# Patient Record
Sex: FEMALE | Race: WHITE | NOT HISPANIC OR LATINO | Employment: OTHER | URBAN - METROPOLITAN AREA
[De-identification: names, ages, dates, MRNs, and addresses within clinical notes are randomized per-mention and may not be internally consistent; named-entity substitution may affect disease eponyms.]

---

## 2019-03-11 ENCOUNTER — HOSPITAL ENCOUNTER (OUTPATIENT)
Dept: CARDIOLOGY | Facility: HOSPITAL | Age: 64
Discharge: HOME | End: 2019-03-11

## 2019-03-19 ENCOUNTER — HOSPITAL ENCOUNTER (OUTPATIENT)
Dept: OTHER | Facility: HOSPITAL | Age: 64
Discharge: HOME | End: 2019-03-19

## 2019-03-19 LAB
CREAT BLD-MCNC: 0.7 MG/DL (ref 0.6–1.4)
GFR SERPLBLD BASED ON 1.73 SQ M-ARVRAT: >60 ML/MIN/{1.73_M2}

## 2019-04-02 ENCOUNTER — HOSPITAL ENCOUNTER (OUTPATIENT)
Dept: CARDIOLOGY | Facility: HOSPITAL | Age: 64
Discharge: HOME | End: 2019-04-02

## 2019-05-13 ENCOUNTER — HOSPITAL ENCOUNTER (OUTPATIENT)
Dept: OTHER | Facility: HOSPITAL | Age: 64
Discharge: HOME | End: 2019-05-13
Attending: INTERNAL MEDICINE

## 2019-05-13 LAB
ANION GAP SERPL CALC-SCNC: 15 MMOL/L (ref 8–19)
BASOPHILS # BLD AUTO: 0.04 10*3/UL (ref 0–0.2)
BASOPHILS NFR BLD AUTO: 0.7 % (ref 0–3)
BUN SERPL-MCNC: 8 MG/DL (ref 5–25)
BUN/CREAT SERPL: 13 {RATIO} (ref 6–20)
CALCIUM SERPL-MCNC: 9.9 MG/DL (ref 8.7–10.4)
CHLORIDE SERPL-SCNC: 100 MMOL/L (ref 99–111)
CONV ABS IMM GRAN: 0.01 10*3/UL (ref 0–0.2)
CONV CO2: 29 MMOL/L (ref 22–32)
CONV IMMATURE GRAN: 0.2 % (ref 0–1.8)
CREAT UR-MCNC: 0.62 MG/DL (ref 0.5–0.9)
DEPRECATED RDW RBC AUTO: 51.4 FL (ref 36.4–46.3)
EOSINOPHIL # BLD AUTO: 0.15 10*3/UL (ref 0–0.7)
EOSINOPHIL # BLD AUTO: 2.5 % (ref 0–7)
ERYTHROCYTE [DISTWIDTH] IN BLOOD BY AUTOMATED COUNT: 14.2 % (ref 11.7–14.4)
GFR SERPLBLD BASED ON 1.73 SQ M-ARVRAT: >60 ML/MIN/{1.73_M2}
GLUCOSE SERPL-MCNC: 134 MG/DL (ref 65–99)
HBA1C MFR BLD: 15.1 G/DL (ref 12–16)
HCT VFR BLD AUTO: 47.2 % (ref 37–47)
INR PPP: 0.97 (ref 2–3)
LYMPHOCYTES # BLD AUTO: 2.95 10*3/UL (ref 1–5)
MCH RBC QN AUTO: 31 PG (ref 27–31)
MCHC RBC AUTO-ENTMCNC: 32 G/DL (ref 33–37)
MCV RBC AUTO: 96.9 FL (ref 81–99)
MONOCYTES # BLD AUTO: 0.44 10*3/UL (ref 0.2–1.2)
MONOCYTES NFR BLD AUTO: 7.4 % (ref 3–10)
NEUTROPHILS # BLD AUTO: 2.36 10*3/UL (ref 2–8)
NEUTROPHILS NFR BLD AUTO: 39.6 % (ref 30–85)
NRBC CBCN: 0 % (ref 0–0.7)
OSMOLALITY SERPL CALC.SUM OF ELEC: 290 MOSM/KG (ref 273–304)
PLATELET # BLD AUTO: 189 10*3/UL (ref 130–400)
PMV BLD AUTO: 11.3 FL (ref 9.4–12.3)
POTASSIUM SERPL-SCNC: 4.2 MMOL/L (ref 3.5–5.3)
PROTHROMBIN TIME: 10.1 S (ref 9.4–12)
RBC # BLD AUTO: 4.87 10*6/UL (ref 4.2–5.4)
SODIUM SERPL-SCNC: 140 MMOL/L (ref 135–147)
VARIANT LYMPHS NFR BLD MANUAL: 49.6 % (ref 20–45)
WBC # BLD AUTO: 5.95 10*3/UL (ref 4.8–10.8)

## 2019-05-22 ENCOUNTER — HOSPITAL ENCOUNTER (OUTPATIENT)
Dept: INFUSION THERAPY | Facility: HOSPITAL | Age: 64
Setting detail: HOSPITAL OUTPATIENT SURGERY
Discharge: HOME | End: 2019-05-22
Attending: INTERNAL MEDICINE

## 2019-05-22 LAB
HBA1C MFR BLD: 15 G/DL (ref 12–16)
HCT VFR BLD AUTO: 47.2 % (ref 37–47)

## 2021-06-02 ENCOUNTER — TRANSCRIBE ORDERS (OUTPATIENT)
Dept: ADMINISTRATIVE | Facility: HOSPITAL | Age: 66
End: 2021-06-02

## 2021-06-02 DIAGNOSIS — S46.912A STRAIN OF UNSPECIFIED MUSCLE, FASCIA AND TENDON AT SHOULDER AND UPPER ARM LEVEL, LEFT ARM, INITIAL ENCOUNTER: Primary | ICD-10-CM

## 2023-02-02 ENCOUNTER — TRANSCRIBE ORDERS (OUTPATIENT)
Dept: ADMINISTRATIVE | Facility: HOSPITAL | Age: 68
End: 2023-02-02
Payer: MEDICARE

## 2023-02-02 DIAGNOSIS — Z12.2 ENCOUNTER FOR SCREENING FOR LUNG CANCER: ICD-10-CM

## 2023-02-02 DIAGNOSIS — R09.89 ABNORMAL CHEST SOUNDS: Primary | ICD-10-CM

## 2023-02-02 DIAGNOSIS — R09.89 BRUIT: ICD-10-CM

## 2023-02-02 DIAGNOSIS — R09.89 CAROTID BRUIT, UNSPECIFIED LATERALITY: Primary | ICD-10-CM

## 2023-02-15 ENCOUNTER — HOSPITAL ENCOUNTER (OUTPATIENT)
Dept: CT IMAGING | Facility: HOSPITAL | Age: 68
Discharge: HOME | End: 2023-02-15
Admitting: NURSE PRACTITIONER
Payer: MEDICARE

## 2023-02-15 DIAGNOSIS — Z12.2 ENCOUNTER FOR SCREENING FOR LUNG CANCER: ICD-10-CM

## 2023-02-15 PROCEDURE — 71271 CT THORAX LUNG CANCER SCR C-: CPT

## 2023-03-01 ENCOUNTER — HOSPITAL ENCOUNTER (OUTPATIENT)
Dept: CARDIOLOGY | Facility: HOSPITAL | Age: 68
Discharge: HOME | End: 2023-03-01
Admitting: NURSE PRACTITIONER
Payer: MEDICARE

## 2023-03-01 DIAGNOSIS — R09.89 BRUIT: ICD-10-CM

## 2023-03-01 LAB
BH CV XLRA MEAS LEFT CAROTID BULB EDV: 39 CM/SEC
BH CV XLRA MEAS LEFT CAROTID BULB PSV: 215 CM/SEC
BH CV XLRA MEAS LEFT DIST CCA EDV: 16 CM/SEC
BH CV XLRA MEAS LEFT DIST CCA PSV: 63 CM/SEC
BH CV XLRA MEAS LEFT DIST ICA EDV: 26 CM/SEC
BH CV XLRA MEAS LEFT DIST ICA PSV: 89 CM/SEC
BH CV XLRA MEAS LEFT MID ICA EDV: 52 CM/SEC
BH CV XLRA MEAS LEFT MID ICA PSV: 226 CM/SEC
BH CV XLRA MEAS LEFT PROX CCA EDV: 14 CM/SEC
BH CV XLRA MEAS LEFT PROX CCA PSV: 72 CM/SEC
BH CV XLRA MEAS LEFT PROX ECA EDV: 21 CM/SEC
BH CV XLRA MEAS LEFT PROX ECA PSV: 126 CM/SEC
BH CV XLRA MEAS LEFT PROX ICA EDV: 78 CM/SEC
BH CV XLRA MEAS LEFT PROX ICA PSV: 335 CM/SEC
BH CV XLRA MEAS LEFT VERTEBRAL A EDV: 23 CM/SEC
BH CV XLRA MEAS LEFT VERTEBRAL A PSV: 77 CM/SEC
BH CV XLRA MEAS RIGHT CAROTID BULB EDV: 56 CM/SEC
BH CV XLRA MEAS RIGHT CAROTID BULB PSV: 165 CM/SEC
BH CV XLRA MEAS RIGHT DIST CCA EDV: 23 CM/SEC
BH CV XLRA MEAS RIGHT DIST CCA PSV: 58 CM/SEC
BH CV XLRA MEAS RIGHT DIST ICA EDV: 42 CM/SEC
BH CV XLRA MEAS RIGHT DIST ICA PSV: 162 CM/SEC
BH CV XLRA MEAS RIGHT MID ICA EDV: 59 CM/SEC
BH CV XLRA MEAS RIGHT MID ICA PSV: 233 CM/SEC
BH CV XLRA MEAS RIGHT PROX CCA EDV: 17 CM/SEC
BH CV XLRA MEAS RIGHT PROX CCA PSV: 63 CM/SEC
BH CV XLRA MEAS RIGHT PROX ECA EDV: 18 CM/SEC
BH CV XLRA MEAS RIGHT PROX ECA PSV: 122 CM/SEC
BH CV XLRA MEAS RIGHT PROX ICA EDV: 94 CM/SEC
BH CV XLRA MEAS RIGHT PROX ICA PSV: 301 CM/SEC
BH CV XLRA MEAS RIGHT VERTEBRAL A EDV: 15 CM/SEC
BH CV XLRA MEAS RIGHT VERTEBRAL A PSV: 52 CM/SEC
LEFT ARM BP: NORMAL MMHG
MAXIMAL PREDICTED HEART RATE: 153 BPM
RIGHT ARM BP: NORMAL MMHG
STRESS TARGET HR: 130 BPM

## 2023-03-01 PROCEDURE — 93880 EXTRACRANIAL BILAT STUDY: CPT

## 2023-03-01 PROCEDURE — 93880 EXTRACRANIAL BILAT STUDY: CPT | Performed by: SURGERY

## 2023-04-13 ENCOUNTER — TRANSCRIBE ORDERS (OUTPATIENT)
Dept: ADMINISTRATIVE | Facility: HOSPITAL | Age: 68
End: 2023-04-13
Payer: MEDICARE

## 2023-04-13 DIAGNOSIS — I65.23 BILATERAL CAROTID ARTERY STENOSIS: Primary | ICD-10-CM

## 2023-08-08 ENCOUNTER — HOSPITAL ENCOUNTER (OUTPATIENT)
Dept: NUCLEAR MEDICINE | Facility: HOSPITAL | Age: 68
Discharge: HOME | End: 2023-08-08
Payer: MEDICARE

## 2023-08-08 DIAGNOSIS — I25.118 CORONARY ARTERY DISEASE INVOLVING NATIVE CORONARY ARTERY OF NATIVE HEART WITH OTHER FORM OF ANGINA PECTORIS: ICD-10-CM

## 2023-08-08 DIAGNOSIS — Z01.818 PREOPERATIVE CLEARANCE: ICD-10-CM

## 2023-08-08 PROCEDURE — 93017 CV STRESS TEST TRACING ONLY: CPT

## 2023-08-08 PROCEDURE — 0 TECHNETIUM TETROFOSMIN KIT: Performed by: INTERNAL MEDICINE

## 2023-08-08 PROCEDURE — 25010000002 REGADENOSON 0.4 MG/5ML SOLUTION: Performed by: INTERNAL MEDICINE

## 2023-08-08 PROCEDURE — 78452 HT MUSCLE IMAGE SPECT MULT: CPT

## 2023-08-08 PROCEDURE — A9502 TC99M TETROFOSMIN: HCPCS | Performed by: INTERNAL MEDICINE

## 2023-08-08 RX ORDER — REGADENOSON 0.08 MG/ML
0.4 INJECTION, SOLUTION INTRAVENOUS
Status: COMPLETED | OUTPATIENT
Start: 2023-08-08 | End: 2023-08-08

## 2023-08-08 RX ADMIN — TETROFOSMIN 1 DOSE: 1.38 INJECTION, POWDER, LYOPHILIZED, FOR SOLUTION INTRAVENOUS at 08:59

## 2023-08-08 RX ADMIN — TETROFOSMIN 1 DOSE: 1.38 INJECTION, POWDER, LYOPHILIZED, FOR SOLUTION INTRAVENOUS at 07:25

## 2023-08-08 RX ADMIN — REGADENOSON 0.4 MG: 0.08 INJECTION, SOLUTION INTRAVENOUS at 08:59

## 2023-08-09 LAB
BH CV IMMEDIATE POST RECOVERY TECH DATA SYMPTOMS: NORMAL
BH CV IMMEDIATE POST TECH DATA BLOOD PRESSURE: NORMAL MMHG
BH CV IMMEDIATE POST TECH DATA HEART RATE: 112 BPM
BH CV IMMEDIATE POST TECH DATA OXYGEN SATS: 97 %
BH CV NINE MINUTE RECOVERY TECH DATA BLOOD PRESSURE: NORMAL MMHG
BH CV NINE MINUTE RECOVERY TECH DATA HEART RATE: 76 BPM
BH CV NINE MINUTE RECOVERY TECH DATA OXYGEN SATURATION: 97 %
BH CV NINE MINUTE RECOVERY TECH DATA SYMPTOMS: NORMAL
BH CV REST NUCLEAR ISOTOPE DOSE: 10 MCI
BH CV SIX MINUTE RECOVERY TECH DATA BLOOD PRESSURE: NORMAL
BH CV SIX MINUTE RECOVERY TECH DATA HEART RATE: 82 BPM
BH CV SIX MINUTE RECOVERY TECH DATA OXYGEN SATURATION: 97 %
BH CV SIX MINUTE RECOVERY TECH DATA SYMPTOMS: NORMAL
BH CV STRESS BP STAGE 1: NORMAL
BH CV STRESS BP STAGE 2: NORMAL
BH CV STRESS COMMENTS STAGE 1: NORMAL
BH CV STRESS COMMENTS STAGE 2: NORMAL
BH CV STRESS DOSE REGADENOSON STAGE 1: 0.4
BH CV STRESS DURATION MIN STAGE 1: 0
BH CV STRESS DURATION MIN STAGE 2: 2
BH CV STRESS DURATION SEC STAGE 1: 0
BH CV STRESS DURATION SEC STAGE 2: 0
BH CV STRESS GRADE STAGE 1: 10
BH CV STRESS GRADE STAGE 2: 0
BH CV STRESS HR STAGE 1: 115
BH CV STRESS HR STAGE 2: 125
BH CV STRESS METS STAGE 1: 5
BH CV STRESS NUCLEAR ISOTOPE DOSE: 35.9 MCI
BH CV STRESS O2 STAGE 1: 96
BH CV STRESS PROTOCOL 1: NORMAL
BH CV STRESS RECOVERY BP: NORMAL MMHG
BH CV STRESS RECOVERY HR: 77 BPM
BH CV STRESS RECOVERY O2: 97 %
BH CV STRESS SPEED STAGE 1: 1.7
BH CV STRESS SPEED STAGE 2: 1.6
BH CV STRESS STAGE 1: 1
BH CV STRESS STAGE 2: 2
BH CV THREE MINUTE POST TECH DATA BLOOD PRESSURE: NORMAL MMHG
BH CV THREE MINUTE POST TECH DATA HEART RATE: 88 BPM
BH CV THREE MINUTE POST TECH DATA OXYGEN SATURATION: 96 %
BH CV THREE MINUTE RECOVERY TECH DATA SYMPTOM: NORMAL
LV EF NUC BP: 42 %
MAXIMAL PREDICTED HEART RATE: 153 BPM
PERCENT MAX PREDICTED HR: 81.7 %
STRESS BASELINE BP: NORMAL MMHG
STRESS BASELINE HR: 61 BPM
STRESS O2 SAT REST: 94 %
STRESS PERCENT HR: 96 %
STRESS POST ESTIMATED WORKLOAD: 4.9 METS
STRESS POST EXERCISE DUR MIN: 5 MIN
STRESS POST EXERCISE DUR SEC: 0 SEC
STRESS POST O2 SAT PEAK: 98 %
STRESS POST PEAK BP: NORMAL MMHG
STRESS POST PEAK HR: 125 BPM
STRESS TARGET HR: 130 BPM

## 2023-08-11 ENCOUNTER — TELEPHONE (OUTPATIENT)
Dept: CARDIOLOGY | Facility: CLINIC | Age: 68
End: 2023-08-11
Payer: MEDICARE

## 2023-08-11 NOTE — TELEPHONE ENCOUNTER
----- Message from HERVE Ferrell sent at 8/10/2023 10:08 AM EDT -----  Need to see her for follow up in clinic to discuss the results of her stress test.    +generalized weakness, +left arm pain

## 2023-08-14 ENCOUNTER — TELEPHONE (OUTPATIENT)
Dept: CARDIOLOGY | Facility: CLINIC | Age: 68
End: 2023-08-14

## 2023-08-14 ENCOUNTER — PREP FOR SURGERY (OUTPATIENT)
Dept: OTHER | Facility: HOSPITAL | Age: 68
End: 2023-08-14
Payer: MEDICARE

## 2023-08-14 ENCOUNTER — TELEPHONE (OUTPATIENT)
Dept: CARDIOLOGY | Facility: CLINIC | Age: 68
End: 2023-08-14
Payer: MEDICARE

## 2023-08-14 DIAGNOSIS — Z01.818 PREOPERATIVE CLEARANCE: Primary | ICD-10-CM

## 2023-08-14 DIAGNOSIS — R94.39 ABNORMAL NUCLEAR STRESS TEST: ICD-10-CM

## 2023-08-14 RX ORDER — SODIUM CHLORIDE 9 MG/ML
40 INJECTION, SOLUTION INTRAVENOUS AS NEEDED
OUTPATIENT
Start: 2023-08-14

## 2023-08-14 RX ORDER — SODIUM CHLORIDE 0.9 % (FLUSH) 0.9 %
10 SYRINGE (ML) INJECTION AS NEEDED
OUTPATIENT
Start: 2023-08-14

## 2023-08-14 RX ORDER — SODIUM CHLORIDE 0.9 % (FLUSH) 0.9 %
10 SYRINGE (ML) INJECTION EVERY 12 HOURS SCHEDULED
OUTPATIENT
Start: 2023-08-14

## 2023-08-14 NOTE — TELEPHONE ENCOUNTER
I did not speak to this patient. I forwarded the message for patient to schedule an appointment per Allie's request. It appears patient is wanting to review results over the phone/ televisit. Please advise if ok.

## 2023-08-14 NOTE — TELEPHONE ENCOUNTER
Hub staff attempted to follow warm transfer process and was unsuccessful     Caller: GABRIELLA TELLEZ    Relationship to patient: Emergency Contact    Best call back number: [phone]     Patient is needing: PT DAUGHTER RETURNING CALL IN REGARDS TO A TELEHEALTH VISIT. SPOKE WITH CAITLYN EARLIER. PLEASE CALL DAUGHTER INSTEAD. DOES NOT NEED APPT PER DAUGHTER JUST WANTS RESULTS AND CLEARANCE.

## 2023-08-14 NOTE — TELEPHONE ENCOUNTER
Patient left VM with RN, Patient would like provider to call patient and go over results over the phone.     Please advise.

## 2023-08-14 NOTE — TELEPHONE ENCOUNTER
Caller: GABRIELLA TELLEZ    Relationship to patient: Emergency Contact    Best call back number: [phone]     Chief complaint: PATIENT ASKS FOR A TELEHEALTH VISIT FOR CARDIAC CLEARANCE     Type of visit: FU    Requested date: ASAP     If rescheduling, when is the original appointment:      Additional notes: PATIENT HAD A NUCLEAR STRESS TEST DONE 8.9.23. PATIENT WAS LAST SEEN IN THE OFFICE ON 6.30.23

## 2023-08-14 NOTE — TELEPHONE ENCOUNTER
Called pt to schedule an appt for results. Pt wanted to get results over phone rather than schedule an appt. Was unable to schedule. Transferred call to Elva per pts request.

## 2023-08-17 ENCOUNTER — TELEPHONE (OUTPATIENT)
Dept: CARDIOLOGY | Facility: CLINIC | Age: 68
End: 2023-08-17
Payer: MEDICARE

## 2023-08-17 NOTE — TELEPHONE ENCOUNTER
I spoke to patient and gave an arrival time of 8:00 on 08/23/23 for [facility]. Patient was instructed to have a  for the day of the procedure and to arrive at the main entrance/registration area. Patient was educated about stent placement and informed that in the event of stent placement, the patient will be required to stay overnight for observation. Patient was instructed to hold Metformin for 24 hours prior to procedure. Patient was instructed to continue all other medications as usual. Patient was instructed to be NPO after midnight with sips of water as needed. Patient is agreeable with no other questions or concerns.

## 2023-08-23 ENCOUNTER — HOSPITAL ENCOUNTER (OUTPATIENT)
Facility: HOSPITAL | Age: 68
Setting detail: HOSPITAL OUTPATIENT SURGERY
Discharge: HOME | End: 2023-08-23
Attending: INTERNAL MEDICINE | Admitting: INTERNAL MEDICINE
Payer: MEDICARE

## 2023-08-23 ENCOUNTER — TELEPHONE (OUTPATIENT)
Dept: CARDIAC SURGERY | Facility: CLINIC | Age: 68
End: 2023-08-23
Payer: MEDICARE

## 2023-08-23 ENCOUNTER — TELEPHONE (OUTPATIENT)
Dept: CARDIAC SURGERY | Facility: CLINIC | Age: 68
End: 2023-08-23

## 2023-08-23 VITALS
RESPIRATION RATE: 20 BRPM | HEART RATE: 68 BPM | TEMPERATURE: 98.7 F | OXYGEN SATURATION: 96 % | BODY MASS INDEX: 25.76 KG/M2 | WEIGHT: 140 LBS | HEIGHT: 62 IN | SYSTOLIC BLOOD PRESSURE: 174 MMHG | DIASTOLIC BLOOD PRESSURE: 63 MMHG

## 2023-08-23 DIAGNOSIS — R94.39 ABNORMAL NUCLEAR STRESS TEST: ICD-10-CM

## 2023-08-23 DIAGNOSIS — I25.10 CAD, MULTIPLE VESSEL: Primary | ICD-10-CM

## 2023-08-23 DIAGNOSIS — Z01.818 PREOPERATIVE CLEARANCE: ICD-10-CM

## 2023-08-23 LAB
ANION GAP SERPL CALCULATED.3IONS-SCNC: 10.2 MMOL/L (ref 5–15)
BUN SERPL-MCNC: 11 MG/DL (ref 8–23)
BUN/CREAT SERPL: 15.7 (ref 7–25)
CALCIUM SPEC-SCNC: 9.9 MG/DL (ref 8.6–10.5)
CHLORIDE SERPL-SCNC: 100 MMOL/L (ref 98–107)
CO2 SERPL-SCNC: 27.8 MMOL/L (ref 22–29)
CREAT SERPL-MCNC: 0.7 MG/DL (ref 0.57–1)
DEPRECATED RDW RBC AUTO: 48.9 FL (ref 37–54)
EGFRCR SERPLBLD CKD-EPI 2021: 94.9 ML/MIN/1.73
ERYTHROCYTE [DISTWIDTH] IN BLOOD BY AUTOMATED COUNT: 13.8 % (ref 12.3–15.4)
GLUCOSE SERPL-MCNC: 351 MG/DL (ref 65–99)
HCT VFR BLD AUTO: 43.6 % (ref 34–46.6)
HGB BLD-MCNC: 14 G/DL (ref 12–15.9)
INR PPP: 1 (ref 0.86–1.15)
MCH RBC QN AUTO: 30.8 PG (ref 26.6–33)
MCHC RBC AUTO-ENTMCNC: 32.1 G/DL (ref 31.5–35.7)
MCV RBC AUTO: 95.8 FL (ref 79–97)
PLATELET # BLD AUTO: 167 10*3/MM3 (ref 140–450)
PMV BLD AUTO: 11 FL (ref 6–12)
POTASSIUM SERPL-SCNC: 4.4 MMOL/L (ref 3.5–5.2)
PROTHROMBIN TIME: 13.3 SECONDS (ref 11.8–14.9)
RBC # BLD AUTO: 4.55 10*6/MM3 (ref 3.77–5.28)
SODIUM SERPL-SCNC: 138 MMOL/L (ref 136–145)
WBC NRBC COR # BLD: 5.27 10*3/MM3 (ref 3.4–10.8)

## 2023-08-23 PROCEDURE — 25510000001 IOPAMIDOL PER 1 ML: Performed by: INTERNAL MEDICINE

## 2023-08-23 PROCEDURE — 93458 L HRT ARTERY/VENTRICLE ANGIO: CPT | Performed by: INTERNAL MEDICINE

## 2023-08-23 PROCEDURE — C1894 INTRO/SHEATH, NON-LASER: HCPCS | Performed by: INTERNAL MEDICINE

## 2023-08-23 PROCEDURE — 99152 MOD SED SAME PHYS/QHP 5/>YRS: CPT | Performed by: INTERNAL MEDICINE

## 2023-08-23 PROCEDURE — 25010000002 MIDAZOLAM PER 1MG: Performed by: INTERNAL MEDICINE

## 2023-08-23 PROCEDURE — C1769 GUIDE WIRE: HCPCS | Performed by: INTERNAL MEDICINE

## 2023-08-23 PROCEDURE — 85610 PROTHROMBIN TIME: CPT

## 2023-08-23 PROCEDURE — 85027 COMPLETE CBC AUTOMATED: CPT

## 2023-08-23 PROCEDURE — S0260 H&P FOR SURGERY: HCPCS | Performed by: INTERNAL MEDICINE

## 2023-08-23 PROCEDURE — 25010000002 HEPARIN (PORCINE) PER 1000 UNITS: Performed by: INTERNAL MEDICINE

## 2023-08-23 PROCEDURE — 25010000002 FENTANYL CITRATE (PF) 50 MCG/ML SOLUTION: Performed by: INTERNAL MEDICINE

## 2023-08-23 PROCEDURE — 80048 BASIC METABOLIC PNL TOTAL CA: CPT

## 2023-08-23 RX ORDER — FENTANYL CITRATE 50 UG/ML
INJECTION, SOLUTION INTRAMUSCULAR; INTRAVENOUS
Status: DISCONTINUED | OUTPATIENT
Start: 2023-08-23 | End: 2023-08-23 | Stop reason: HOSPADM

## 2023-08-23 RX ORDER — SODIUM CHLORIDE 9 MG/ML
100 INJECTION, SOLUTION INTRAVENOUS CONTINUOUS
Status: CANCELLED | OUTPATIENT
Start: 2023-08-23 | End: 2023-08-23

## 2023-08-23 RX ORDER — HEPARIN SODIUM 1000 [USP'U]/ML
INJECTION, SOLUTION INTRAVENOUS; SUBCUTANEOUS
Status: DISCONTINUED | OUTPATIENT
Start: 2023-08-23 | End: 2023-08-23 | Stop reason: HOSPADM

## 2023-08-23 RX ORDER — ACETAMINOPHEN 325 MG/1
650 TABLET ORAL EVERY 4 HOURS PRN
Status: CANCELLED | OUTPATIENT
Start: 2023-08-23

## 2023-08-23 RX ORDER — NITROGLYCERIN 0.4 MG/1
0.4 TABLET SUBLINGUAL
Status: CANCELLED | OUTPATIENT
Start: 2023-08-23

## 2023-08-23 RX ORDER — MIDAZOLAM HYDROCHLORIDE 2 MG/2ML
INJECTION, SOLUTION INTRAMUSCULAR; INTRAVENOUS
Status: DISCONTINUED | OUTPATIENT
Start: 2023-08-23 | End: 2023-08-23 | Stop reason: HOSPADM

## 2023-08-23 RX ORDER — LIDOCAINE HYDROCHLORIDE 20 MG/ML
INJECTION, SOLUTION INFILTRATION; PERINEURAL
Status: DISCONTINUED | OUTPATIENT
Start: 2023-08-23 | End: 2023-08-23 | Stop reason: HOSPADM

## 2023-08-23 RX ORDER — VERAPAMIL HYDROCHLORIDE 2.5 MG/ML
INJECTION, SOLUTION INTRAVENOUS
Status: DISCONTINUED | OUTPATIENT
Start: 2023-08-23 | End: 2023-08-23 | Stop reason: HOSPADM

## 2023-08-23 RX ORDER — ONDANSETRON 4 MG/1
4 TABLET, FILM COATED ORAL EVERY 6 HOURS PRN
Status: CANCELLED | OUTPATIENT
Start: 2023-08-23

## 2023-08-23 RX ORDER — SODIUM CHLORIDE 0.9 % (FLUSH) 0.9 %
10 SYRINGE (ML) INJECTION AS NEEDED
Status: DISCONTINUED | OUTPATIENT
Start: 2023-08-23 | End: 2023-08-23 | Stop reason: HOSPADM

## 2023-08-23 RX ORDER — ONDANSETRON 2 MG/ML
4 INJECTION INTRAMUSCULAR; INTRAVENOUS EVERY 6 HOURS PRN
Status: CANCELLED | OUTPATIENT
Start: 2023-08-23

## 2023-08-23 RX ORDER — SODIUM CHLORIDE 9 MG/ML
40 INJECTION, SOLUTION INTRAVENOUS AS NEEDED
Status: DISCONTINUED | OUTPATIENT
Start: 2023-08-23 | End: 2023-08-23 | Stop reason: HOSPADM

## 2023-08-23 RX ORDER — SODIUM CHLORIDE 0.9 % (FLUSH) 0.9 %
10 SYRINGE (ML) INJECTION EVERY 12 HOURS SCHEDULED
Status: DISCONTINUED | OUTPATIENT
Start: 2023-08-23 | End: 2023-08-23 | Stop reason: HOSPADM

## 2023-08-23 NOTE — NURSING NOTE
Patient will be having bypass done in [place]. Daughter at bedside was in contact with doctor taking over care to set up appointment. Educated patient and daughter at bedside about radial site care. Provided information regarding after care of radial site as well as more information on bypass procedures. Patient and daughter verbalized understanding and what to do if they had any further questions or concerns. IV removed and intact. Patient transported to main admitting.

## 2023-08-23 NOTE — TELEPHONE ENCOUNTER
Called jeffy jo spoke with Monica she will put CTA head and neck on disc and fax report. Called roberto and spoke with Gaby and they will pick disc up in 2hrs and deliver it.

## 2023-08-23 NOTE — H&P
Patient is a 68-year-old female with past medical history significant for coronary artery disease, hypertension and hyperlipidemia.  She also has a history of diabetes.  She is sent here for preop clearance for left ICA stenosis.  She states she had a heart attack back in 1996 and required balloon angioplasty.  She did have a car heart cath done 5/22/2019 that showed a 60 to 70% proximal LAD stenosis that was FFR negative.  There was also ostial 45% left circumflex disease.  No significant RCA disease.  She is not very active but when she is walking around she notes no chest pain.  She denies any palpitations or edema.  She does continue to smoke 1 pack of cigarettes per week.  She notes no alcohol use.  Mother had a history of coronary artery bypass grafting.        Medical History        Past Medical History:   Diagnosis Date    Coronary artery disease 1996    Hypertension 1996    Myocardial infarction 1996               Surgical History         Past Surgical History:   Procedure Laterality Date    CARDIAC CATHETERIZATION   1996               Social History   Social History            Socioeconomic History    Marital status:    Tobacco Use    Smoking status: Every Day       Types: Cigarettes    Smokeless tobacco: Never   Vaping Use    Vaping Use: Never used   Substance and Sexual Activity    Alcohol use: Never    Drug use: Never    Sexual activity: Not Currently       Partners: Male       Birth control/protection: Tubal ligation                     Family History   Problem Relation Age of Onset    Heart attack Mother                   Allergies   Allergen Reactions    Theophylline Hives               Current Outpatient Medications:     clopidogrel (PLAVIX) 75 MG tablet, Take 1 tablet by mouth Daily., Disp: , Rfl:     ergocalciferol (ERGOCALCIFEROL) 1.25 MG (55190 UT) capsule, Take 1 capsule by mouth 1 (One) Time Per Week., Disp: , Rfl:     insulin detemir (Levemir FlexTouch) 100 UNIT/ML injection, Inject 20  "Units under the skin into the appropriate area as directed Daily., Disp: , Rfl:     Lantus SoloStar 100 UNIT/ML injection pen, Lantus Solostar U-100 Insulin 100 unit/mL (3 mL) subcutaneous pen, Disp: , Rfl:     metFORMIN (GLUCOPHAGE) 1000 MG tablet, Take 1 tablet by mouth 2 (Two) Times a Day With Meals., Disp: , Rfl:     metoprolol succinate XL (TOPROL-XL) 100 MG 24 hr tablet, Take 1 tablet by mouth Daily., Disp: , Rfl:     rosuvastatin (CRESTOR) 40 MG tablet, Take 1 tablet by mouth Daily., Disp: , Rfl:     venlafaxine (EFFEXOR) 75 MG tablet, Take 1 tablet by mouth Daily., Disp: , Rfl:     losartan-hydrochlorothiazide (HYZAAR) 100-12.5 MG per tablet, Take 1 tablet by mouth Daily., Disp: 90 tablet, Rfl: 3    varenicline (Chantix Continuing Month Pool) 1 MG tablet, Take 1 tablet by mouth 2 (Two) Times a Day., Disp: 60 tablet, Rfl: 3        ROS:  Cardiac review of systems negative.           Objective         /77   Pulse 83   Ht 157.5 cm (62\")   Wt 68.7 kg (151 lb 6.4 oz)   BMI 27.69 kg/mý        General Appearance:   well developed  well nourished  HENT:   oropharynx moist  lips not cyanotic  Respiratory:  no respiratory distress  normal breath sounds  no rales  Cardiovascular:  no jugular venous distention  regular rhythm  S1 normal, S2 normal  no S3, no S4   no murmur  no rub, no thrill  No carotid bruit  pedal pulses normal  lower extremity edema: none    Musculoskeletal:  no clubbing of fingers.   normocephalic, head atraumatic  Skin:   warm, dry  Psychiatric:  judgement and insight appropriate  normal mood and affect     ECHO:    STRESS:     CATH:  No results found for this or any previous visit.     BMP:            Glucose   Date Value Ref Range Status   05/13/2019 134 (H) 65 - 99 mg/dL Final            BUN   Date Value Ref Range Status   05/13/2019 8 5 - 25 mg/dL Final              Creatinine   Date Value Ref Range Status   06/19/2023 0.80 mg/dL Final       Comment:       Serial Number: [id number] "  [id number]            Sodium   Date Value Ref Range Status   05/13/2019 140 135 - 147 mmol/L Final            Potassium   Date Value Ref Range Status   05/13/2019 4.2 3.5 - 5.3 mmol/L Final            Chloride   Date Value Ref Range Status   05/13/2019 100 99 - 111 mmol/L Final            CO2   Date Value Ref Range Status   05/13/2019 29 22 - 32 mmol/L Final            Calcium   Date Value Ref Range Status   05/13/2019 9.9 8.7 - 10.4 mg/dL Final            BUN/Creatinine Ratio   Date Value Ref Range Status   05/13/2019 13 6 - 20 [ratio] Final            Anion Gap   Date Value Ref Range Status   05/13/2019 15 8 - 19 mmol/L Final            eGFR   Date Value Ref Range Status   06/19/2023 80.9 >60.0 mL/min/1.73 Final      LIPIDS:  No results found for: CHOL, TRIG, HDL, LDL, VLDL, LDLHDL        Procedures              Assessment            ASSESSMENT:  Diagnoses and all orders for this visit:     1. Primary hypertension (Primary)  -     Basic Metabolic Panel; Future     2. Mixed hyperlipidemia     3. Preoperative clearance  -     Stress Test With Myocardial Perfusion One Day; Future     4. Coronary artery disease involving native coronary artery of native heart with other form of angina pectoris  -     Stress Test With Myocardial Perfusion One Day; Future     5. Tobacco abuse     Other orders  -     varenicline (Chantix Continuing Month Pool) 1 MG tablet; Take 1 tablet by mouth 2 (Two) Times a Day.  Dispense: 60 tablet; Refill: 3  -     losartan-hydrochlorothiazide (HYZAAR) 100-12.5 MG per tablet; Take 1 tablet by mouth Daily.  Dispense: 90 tablet; Refill: 3           PLAN:     1.  I have recommended a stress test in this patient as she had a borderline proximal LAD stenosis back 4 years ago that was FFR negative at that time.  She has continued to smoke.  This is a high risk vascular surgery with planned left carotid endarterectomy.  2.  Agree with the aspirin and Crestor.  3.  Encourage smoking cessation.  The patient is  in agreement to try Chantix.  We did talk about potential side effects.  She states her  had tried it in the past.  4.  Blood pressures elevated and was the last time she had it checked.  I have asked her to change her losartan to losartan /12.5 daily.  We will recheck a BMP in 1 to 2 weeks.  5.  We will continue to follow closely.     Stress test showed possible lateral ischemia and ejection fraction of 42%.  With her multiple risk factors and this being a higher risk vascular surgery I have recommended left heart cath.

## 2023-08-23 NOTE — TELEPHONE ENCOUNTER
Caller: GABRIELLA TELLEZ    Relationship: Emergency Contact    Best call back number: [phone]     What is the best time to reach you: ANY     Who are you requesting to speak with (clinical staff, provider,  specific staff member): ANY     What was the call regarding: PATIENTS DAUGHTER, GABRIELLA, WOULD LIKE TO KNOW IF HER MOTHER WILL BE SCHEDULED FOR A CONSULTATION OR SURGERY.     Is it okay if the provider responds through MyChart: GABRIELLA LONG WOULD LIKE A CALL BACK.

## 2023-08-29 ENCOUNTER — TELEPHONE (OUTPATIENT)
Dept: CARDIAC SURGERY | Facility: CLINIC | Age: 68
End: 2023-08-29
Payer: MEDICARE

## 2023-08-29 ENCOUNTER — PREP FOR SURGERY (OUTPATIENT)
Dept: OTHER | Facility: HOSPITAL | Age: 68
End: 2023-08-29
Payer: MEDICARE

## 2023-08-29 DIAGNOSIS — I25.10 CAD, MULTIPLE VESSEL: Primary | ICD-10-CM

## 2023-08-29 DIAGNOSIS — I25.84 CORONARY ATHEROSCLEROSIS DUE TO CALCIFIED CORONARY LESION (CODE): ICD-10-CM

## 2023-08-29 NOTE — TELEPHONE ENCOUNTER
Spoke to daughter with plans for admission and surgery. Patient to be admitted on 8-. Bed board will call when bed is ready and they are to come directly to hospital. Surgery is on  at 0730. Per daughter patient had stopped her PLAVIX on 8-.

## 2023-08-29 NOTE — TELEPHONE ENCOUNTER
Spoke to [facility] to request bed for admission on 831-2023. Patient coming from home. Surgery 9-1-2023.  Pending auth #[id number]

## 2023-08-31 ENCOUNTER — HOSPITAL ENCOUNTER (INPATIENT)
Facility: HOSPITAL | Age: 68
LOS: 8 days | Discharge: HOME HEALTH | DRG: 236 | End: 2023-09-08
Attending: THORACIC SURGERY (CARDIOTHORACIC VASCULAR SURGERY) | Admitting: THORACIC SURGERY (CARDIOTHORACIC VASCULAR SURGERY)
Payer: MEDICARE

## 2023-08-31 ENCOUNTER — APPOINTMENT (OUTPATIENT)
Dept: GENERAL RADIOLOGY | Facility: HOSPITAL | Age: 68
DRG: 236 | End: 2023-08-31
Payer: MEDICARE

## 2023-08-31 ENCOUNTER — APPOINTMENT (OUTPATIENT)
Dept: CARDIOLOGY | Facility: HOSPITAL | Age: 68
DRG: 236 | End: 2023-08-31
Payer: MEDICARE

## 2023-08-31 ENCOUNTER — APPOINTMENT (OUTPATIENT)
Dept: RESPIRATORY THERAPY | Facility: HOSPITAL | Age: 68
DRG: 236 | End: 2023-08-31
Payer: MEDICARE

## 2023-08-31 DIAGNOSIS — J95.89 ACUTE POSTOPERATIVE RESPIRATORY INSUFFICIENCY: ICD-10-CM

## 2023-08-31 DIAGNOSIS — Z95.1 S/P CABG (CORONARY ARTERY BYPASS GRAFT): Primary | ICD-10-CM

## 2023-08-31 PROBLEM — I25.10 CAD (CORONARY ARTERY DISEASE): Status: ACTIVE | Noted: 2023-08-31

## 2023-08-31 LAB
ABO GROUP BLD: NORMAL
ALBUMIN SERPL-MCNC: 4.1 G/DL (ref 3.5–5.2)
ALBUMIN/GLOB SERPL: 1.3 G/DL
ALP SERPL-CCNC: 111 U/L (ref 39–117)
ALT SERPL W P-5'-P-CCNC: 12 U/L (ref 1–33)
ANION GAP SERPL CALCULATED.3IONS-SCNC: 13 MMOL/L (ref 5–15)
APTT PPP: 31 SECONDS (ref 22.7–35.4)
ARTERIAL PATENCY WRIST A: ABNORMAL
AST SERPL-CCNC: 13 U/L (ref 1–32)
ATMOSPHERIC PRESS: 750.3 MMHG
BASE EXCESS BLDA CALC-SCNC: 0.7 MMOL/L (ref 0–2)
BASOPHILS # BLD AUTO: 0.06 10*3/MM3 (ref 0–0.2)
BASOPHILS NFR BLD AUTO: 0.9 % (ref 0–1.5)
BDY SITE: ABNORMAL
BH CV XLRA MEAS - DIST GSV CALF DIST LEFT: 0.35 CM
BH CV XLRA MEAS - DIST GSV CALF DIST RIGHT: 0.33 CM
BH CV XLRA MEAS - DIST GSV THIGH DIST LEFT: 0.4 CM
BH CV XLRA MEAS - DIST GSV THIGH DIST RIGHT: 0.41 CM
BH CV XLRA MEAS - DIST LSV CALF DIST LEFT: 0.27 CM
BH CV XLRA MEAS - DIST LSV CALF DIST RIGHT: 0.25 CM
BH CV XLRA MEAS - GSV ANKLE DIST LEFT: 0.35 CM
BH CV XLRA MEAS - GSV ANKLE DIST RIGHT: 0.33 CM
BH CV XLRA MEAS - GSV KNEE DIST LEFT: 0.36 CM
BH CV XLRA MEAS - GSV KNEE DIST RIGHT: 0.42 CM
BH CV XLRA MEAS - GSV ORIGIN DIST LEFT: 0.56 CM
BH CV XLRA MEAS - GSV ORIGIN DIST RIGHT: 0.71 CM
BH CV XLRA MEAS - MID GSV CALF LEFT: 0.25 CM
BH CV XLRA MEAS - MID GSV CALF RIGHT: 0.28 CM
BH CV XLRA MEAS - MID GSV THIGH  LEFT: 0.36 CM
BH CV XLRA MEAS - MID GSV THIGH  RIGHT: 0.4 CM
BH CV XLRA MEAS - MID LSV CALF DIST LEFT: 0.18 CM
BH CV XLRA MEAS - MID LSV CALF DIST RIGHT: 0.25 CM
BH CV XLRA MEAS - PROX GSV CALF DIST LEFT: 0.38 CM
BH CV XLRA MEAS - PROX GSV CALF DIST RIGHT: 0.36 CM
BH CV XLRA MEAS - PROX GSV THIGH  LEFT: 0.51 CM
BH CV XLRA MEAS - PROX GSV THIGH  RIGHT: 0.41 CM
BH CV XLRA MEAS - PROX LSV CALF DIST LEFT: 0.15 CM
BH CV XLRA MEAS - PROX LSV CALF DIST RIGHT: 0.18 CM
BILIRUB SERPL-MCNC: 0.4 MG/DL (ref 0–1.2)
BLD GP AB SCN SERPL QL: NEGATIVE
BUN SERPL-MCNC: 9 MG/DL (ref 8–23)
BUN/CREAT SERPL: 13.8 (ref 7–25)
CALCIUM SPEC-SCNC: 9.4 MG/DL (ref 8.6–10.5)
CHLORIDE SERPL-SCNC: 101 MMOL/L (ref 98–107)
CHOLEST SERPL-MCNC: 138 MG/DL (ref 0–200)
CLOSE TME COLL+ADP + EPINEP PNL BLD: 82 % (ref 86–100)
CO2 BLDA-SCNC: 27.4 MMOL/L (ref 23–27)
CO2 SERPL-SCNC: 24 MMOL/L (ref 22–29)
CREAT SERPL-MCNC: 0.65 MG/DL (ref 0.57–1)
DEPRECATED RDW RBC AUTO: 43.7 FL (ref 37–54)
EGFRCR SERPLBLD CKD-EPI 2021: 96.6 ML/MIN/1.73
EOSINOPHIL # BLD AUTO: 0.09 10*3/MM3 (ref 0–0.4)
EOSINOPHIL NFR BLD AUTO: 1.3 % (ref 0.3–6.2)
ERYTHROCYTE [DISTWIDTH] IN BLOOD BY AUTOMATED COUNT: 12.5 % (ref 12.3–15.4)
GLOBULIN UR ELPH-MCNC: 3.1 GM/DL
GLUCOSE BLDC GLUCOMTR-MCNC: 177 MG/DL (ref 70–130)
GLUCOSE BLDC GLUCOMTR-MCNC: 338 MG/DL (ref 70–130)
GLUCOSE SERPL-MCNC: 263 MG/DL (ref 65–99)
HBA1C MFR BLD: 11.4 % (ref 4.8–5.6)
HCO3 BLDA-SCNC: 26.1 MMOL/L (ref 22–28)
HCT VFR BLD AUTO: 41.5 % (ref 34–46.6)
HDLC SERPL-MCNC: 32 MG/DL (ref 40–60)
HEMODILUTION: NO
HGB BLD-MCNC: 13.6 G/DL (ref 12–15.9)
IMM GRANULOCYTES # BLD AUTO: 0.01 10*3/MM3 (ref 0–0.05)
IMM GRANULOCYTES NFR BLD AUTO: 0.1 % (ref 0–0.5)
INR PPP: 1.03 (ref 0.9–1.1)
LDLC SERPL CALC-MCNC: 83 MG/DL (ref 0–100)
LDLC/HDLC SERPL: 2.51 {RATIO}
LYMPHOCYTES # BLD AUTO: 2.69 10*3/MM3 (ref 0.7–3.1)
LYMPHOCYTES NFR BLD AUTO: 38.9 % (ref 19.6–45.3)
MAGNESIUM SERPL-MCNC: 1.6 MG/DL (ref 1.6–2.4)
MCH RBC QN AUTO: 30.8 PG (ref 26.6–33)
MCHC RBC AUTO-ENTMCNC: 32.8 G/DL (ref 31.5–35.7)
MCV RBC AUTO: 94.1 FL (ref 79–97)
MODALITY: ABNORMAL
MONOCYTES # BLD AUTO: 0.55 10*3/MM3 (ref 0.1–0.9)
MONOCYTES NFR BLD AUTO: 8 % (ref 5–12)
NEUTROPHILS NFR BLD AUTO: 3.51 10*3/MM3 (ref 1.7–7)
NEUTROPHILS NFR BLD AUTO: 50.8 % (ref 42.7–76)
NRBC BLD AUTO-RTO: 0 /100 WBC (ref 0–0.2)
NT-PROBNP SERPL-MCNC: 383 PG/ML (ref 0–900)
PCO2 BLDA: 43.1 MM HG (ref 35–45)
PH BLDA: 7.39 PH UNITS (ref 7.35–7.45)
PLATELET # BLD AUTO: 160 10*3/MM3 (ref 140–450)
PMV BLD AUTO: 10.8 FL (ref 6–12)
PO2 BLDA: 76.8 MM HG (ref 80–100)
POTASSIUM SERPL-SCNC: 3.8 MMOL/L (ref 3.5–5.2)
PROT SERPL-MCNC: 7.2 G/DL (ref 6–8.5)
PROTHROMBIN TIME: 13.6 SECONDS (ref 11.7–14.2)
QT INTERVAL: 422 MS
QTC INTERVAL: 469 MS
RBC # BLD AUTO: 4.41 10*6/MM3 (ref 3.77–5.28)
RESPIRATORY RATE: 18
RH BLD: POSITIVE
SAO2 % BLDCOA: 95 % (ref 92–98.5)
SARS-COV-2 RNA RESP QL NAA+PROBE: NOT DETECTED
SODIUM SERPL-SCNC: 138 MMOL/L (ref 136–145)
T&S EXPIRATION DATE: NORMAL
TRIGL SERPL-MCNC: 129 MG/DL (ref 0–150)
VENTILATOR MODE: ABNORMAL
VLDLC SERPL-MCNC: 23 MG/DL (ref 5–40)
WBC NRBC COR # BLD: 6.91 10*3/MM3 (ref 3.4–10.8)

## 2023-08-31 PROCEDURE — S0260 H&P FOR SURGERY: HCPCS | Performed by: THORACIC SURGERY (CARDIOTHORACIC VASCULAR SURGERY)

## 2023-08-31 PROCEDURE — 83880 ASSAY OF NATRIURETIC PEPTIDE: CPT | Performed by: NURSE PRACTITIONER

## 2023-08-31 PROCEDURE — 71046 X-RAY EXAM CHEST 2 VIEWS: CPT

## 2023-08-31 PROCEDURE — 63710000001 INSULIN LISPRO (HUMAN) PER 5 UNITS: Performed by: NURSE PRACTITIONER

## 2023-08-31 PROCEDURE — 83735 ASSAY OF MAGNESIUM: CPT | Performed by: NURSE PRACTITIONER

## 2023-08-31 PROCEDURE — 93970 EXTREMITY STUDY: CPT

## 2023-08-31 PROCEDURE — 85610 PROTHROMBIN TIME: CPT | Performed by: NURSE PRACTITIONER

## 2023-08-31 PROCEDURE — 83036 HEMOGLOBIN GLYCOSYLATED A1C: CPT | Performed by: NURSE PRACTITIONER

## 2023-08-31 PROCEDURE — 86900 BLOOD TYPING SEROLOGIC ABO: CPT | Performed by: NURSE PRACTITIONER

## 2023-08-31 PROCEDURE — 85576 BLOOD PLATELET AGGREGATION: CPT | Performed by: NURSE PRACTITIONER

## 2023-08-31 PROCEDURE — 82948 REAGENT STRIP/BLOOD GLUCOSE: CPT

## 2023-08-31 PROCEDURE — 93005 ELECTROCARDIOGRAM TRACING: CPT | Performed by: THORACIC SURGERY (CARDIOTHORACIC VASCULAR SURGERY)

## 2023-08-31 PROCEDURE — 94060 EVALUATION OF WHEEZING: CPT

## 2023-08-31 PROCEDURE — 80061 LIPID PANEL: CPT | Performed by: NURSE PRACTITIONER

## 2023-08-31 PROCEDURE — 85730 THROMBOPLASTIN TIME PARTIAL: CPT | Performed by: NURSE PRACTITIONER

## 2023-08-31 PROCEDURE — 80053 COMPREHEN METABOLIC PANEL: CPT | Performed by: NURSE PRACTITIONER

## 2023-08-31 PROCEDURE — 36600 WITHDRAWAL OF ARTERIAL BLOOD: CPT

## 2023-08-31 PROCEDURE — 86850 RBC ANTIBODY SCREEN: CPT | Performed by: NURSE PRACTITIONER

## 2023-08-31 PROCEDURE — 86901 BLOOD TYPING SEROLOGIC RH(D): CPT | Performed by: NURSE PRACTITIONER

## 2023-08-31 PROCEDURE — 94640 AIRWAY INHALATION TREATMENT: CPT

## 2023-08-31 PROCEDURE — 85025 COMPLETE CBC W/AUTO DIFF WBC: CPT | Performed by: NURSE PRACTITIONER

## 2023-08-31 PROCEDURE — 87635 SARS-COV-2 COVID-19 AMP PRB: CPT | Performed by: NURSE PRACTITIONER

## 2023-08-31 PROCEDURE — 82803 BLOOD GASES ANY COMBINATION: CPT

## 2023-08-31 PROCEDURE — 86920 COMPATIBILITY TEST SPIN: CPT

## 2023-08-31 PROCEDURE — 93010 ELECTROCARDIOGRAM REPORT: CPT | Performed by: STUDENT IN AN ORGANIZED HEALTH CARE EDUCATION/TRAINING PROGRAM

## 2023-08-31 RX ORDER — SODIUM CHLORIDE 9 MG/ML
40 INJECTION, SOLUTION INTRAVENOUS AS NEEDED
Status: CANCELLED | OUTPATIENT
Start: 2023-08-31

## 2023-08-31 RX ORDER — INSULIN LISPRO 100 [IU]/ML
2-9 INJECTION, SOLUTION INTRAVENOUS; SUBCUTANEOUS
Status: DISCONTINUED | OUTPATIENT
Start: 2023-08-31 | End: 2023-08-31

## 2023-08-31 RX ORDER — NITROGLYCERIN 0.4 MG/1
0.4 TABLET SUBLINGUAL
Status: DISCONTINUED | OUTPATIENT
Start: 2023-08-31 | End: 2023-09-01

## 2023-08-31 RX ORDER — BISACODYL 5 MG/1
5 TABLET, DELAYED RELEASE ORAL DAILY PRN
Status: DISCONTINUED | OUTPATIENT
Start: 2023-08-31 | End: 2023-09-01

## 2023-08-31 RX ORDER — ROSUVASTATIN CALCIUM 40 MG/1
40 TABLET, COATED ORAL DAILY
Status: DISCONTINUED | OUTPATIENT
Start: 2023-09-01 | End: 2023-09-01

## 2023-08-31 RX ORDER — SODIUM CHLORIDE 9 MG/ML
40 INJECTION, SOLUTION INTRAVENOUS AS NEEDED
Status: DISCONTINUED | OUTPATIENT
Start: 2023-08-31 | End: 2023-09-01

## 2023-08-31 RX ORDER — ACETAMINOPHEN 325 MG/1
650 TABLET ORAL EVERY 4 HOURS PRN
Status: DISCONTINUED | OUTPATIENT
Start: 2023-08-31 | End: 2023-09-01

## 2023-08-31 RX ORDER — AMOXICILLIN 250 MG
2 CAPSULE ORAL 2 TIMES DAILY
Status: DISCONTINUED | OUTPATIENT
Start: 2023-08-31 | End: 2023-09-01

## 2023-08-31 RX ORDER — CHLORHEXIDINE GLUCONATE 500 MG/1
1 CLOTH TOPICAL EVERY 12 HOURS
Status: DISCONTINUED | OUTPATIENT
Start: 2023-08-31 | End: 2023-09-01

## 2023-08-31 RX ORDER — VENLAFAXINE 75 MG/1
75 TABLET ORAL DAILY
Status: DISCONTINUED | OUTPATIENT
Start: 2023-09-01 | End: 2023-09-01

## 2023-08-31 RX ORDER — INSULIN LISPRO 100 [IU]/ML
3-14 INJECTION, SOLUTION INTRAVENOUS; SUBCUTANEOUS
Status: DISCONTINUED | OUTPATIENT
Start: 2023-08-31 | End: 2023-09-01

## 2023-08-31 RX ORDER — SODIUM CHLORIDE 0.9 % (FLUSH) 0.9 %
10 SYRINGE (ML) INJECTION AS NEEDED
Status: CANCELLED | OUTPATIENT
Start: 2023-08-31

## 2023-08-31 RX ORDER — NICOTINE POLACRILEX 4 MG
15 LOZENGE BUCCAL
Status: DISCONTINUED | OUTPATIENT
Start: 2023-08-31 | End: 2023-09-01

## 2023-08-31 RX ORDER — ALPRAZOLAM 0.25 MG/1
0.25 TABLET ORAL EVERY 8 HOURS PRN
Status: DISCONTINUED | OUTPATIENT
Start: 2023-08-31 | End: 2023-09-01

## 2023-08-31 RX ORDER — ONDANSETRON 2 MG/ML
4 INJECTION INTRAMUSCULAR; INTRAVENOUS EVERY 6 HOURS PRN
Status: DISCONTINUED | OUTPATIENT
Start: 2023-08-31 | End: 2023-09-01

## 2023-08-31 RX ORDER — SODIUM CHLORIDE 0.9 % (FLUSH) 0.9 %
10 SYRINGE (ML) INJECTION AS NEEDED
Status: DISCONTINUED | OUTPATIENT
Start: 2023-08-31 | End: 2023-09-01

## 2023-08-31 RX ORDER — ACETAMINOPHEN 325 MG/1
325 TABLET ORAL EVERY 4 HOURS PRN
Status: DISCONTINUED | OUTPATIENT
Start: 2023-08-31 | End: 2023-08-31

## 2023-08-31 RX ORDER — SODIUM CHLORIDE 0.9 % (FLUSH) 0.9 %
10 SYRINGE (ML) INJECTION EVERY 12 HOURS SCHEDULED
Status: DISCONTINUED | OUTPATIENT
Start: 2023-08-31 | End: 2023-09-01

## 2023-08-31 RX ORDER — POLYETHYLENE GLYCOL 3350 17 G/17G
17 POWDER, FOR SOLUTION ORAL DAILY PRN
Status: DISCONTINUED | OUTPATIENT
Start: 2023-08-31 | End: 2023-09-01

## 2023-08-31 RX ORDER — DEXTROSE MONOHYDRATE 25 G/50ML
25 INJECTION, SOLUTION INTRAVENOUS
Status: DISCONTINUED | OUTPATIENT
Start: 2023-08-31 | End: 2023-09-01

## 2023-08-31 RX ORDER — IBUPROFEN 600 MG/1
1 TABLET ORAL
Status: DISCONTINUED | OUTPATIENT
Start: 2023-08-31 | End: 2023-09-01

## 2023-08-31 RX ORDER — METOPROLOL SUCCINATE 100 MG/1
100 TABLET, EXTENDED RELEASE ORAL DAILY
Status: DISCONTINUED | OUTPATIENT
Start: 2023-09-01 | End: 2023-09-01

## 2023-08-31 RX ORDER — CEFAZOLIN SODIUM 2 G/100ML
2000 INJECTION, SOLUTION INTRAVENOUS ONCE
Status: COMPLETED | OUTPATIENT
Start: 2023-09-01 | End: 2023-09-01

## 2023-08-31 RX ORDER — CHLORHEXIDINE GLUCONATE 0.12 MG/ML
15 RINSE ORAL EVERY 12 HOURS SCHEDULED
Status: COMPLETED | OUTPATIENT
Start: 2023-08-31 | End: 2023-09-01

## 2023-08-31 RX ORDER — SODIUM CHLORIDE 0.9 % (FLUSH) 0.9 %
10 SYRINGE (ML) INJECTION EVERY 12 HOURS SCHEDULED
Status: CANCELLED | OUTPATIENT
Start: 2023-08-31

## 2023-08-31 RX ORDER — ALBUTEROL SULFATE 2.5 MG/3ML
2.5 SOLUTION RESPIRATORY (INHALATION) ONCE
Status: COMPLETED | OUTPATIENT
Start: 2023-08-31 | End: 2023-08-31

## 2023-08-31 RX ORDER — TEMAZEPAM 7.5 MG/1
7.5 CAPSULE ORAL NIGHTLY PRN
Status: DISCONTINUED | OUTPATIENT
Start: 2023-08-31 | End: 2023-09-01

## 2023-08-31 RX ORDER — BISACODYL 10 MG
10 SUPPOSITORY, RECTAL RECTAL DAILY PRN
Status: DISCONTINUED | OUTPATIENT
Start: 2023-08-31 | End: 2023-09-01

## 2023-08-31 RX ADMIN — ALBUTEROL SULFATE 2.5 MG: 2.5 SOLUTION RESPIRATORY (INHALATION) at 12:04

## 2023-08-31 RX ADMIN — INSULIN LISPRO 3 UNITS: 100 INJECTION, SOLUTION INTRAVENOUS; SUBCUTANEOUS at 22:36

## 2023-08-31 RX ADMIN — SENNOSIDES AND DOCUSATE SODIUM 2 TABLET: 50; 8.6 TABLET ORAL at 22:31

## 2023-08-31 RX ADMIN — INSULIN LISPRO 10 UNITS: 100 INJECTION, SOLUTION INTRAVENOUS; SUBCUTANEOUS at 18:38

## 2023-08-31 RX ADMIN — INSULIN LISPRO 6 UNITS: 100 INJECTION, SOLUTION INTRAVENOUS; SUBCUTANEOUS at 14:25

## 2023-08-31 NOTE — CASE MANAGEMENT/SOCIAL WORK
Discharge Planning Assessment  [facility]     Patient Name: Lennie Gomez  MRN: [id number]  Today's Date: 8/31/2023    Admit Date: 8/31/2023    Plan: Home w/ HH (referrals pending)   Discharge Needs Assessment       Row Name 08/31/23 1752       Living Environment    People in Home grandchild(sury)    Name(s) of People in Home dependent 12 year old great grandGaurang hardy    Unique Family Situation Daughter, Blue Aguirre and granddaughter, Kelly (20yo) will be coming to stay with patient post op.    Current Living Arrangements home    Potentially Unsafe Housing Conditions none    Primary Care Provided by self    Provides Primary Care For grandchild(sury);pet(s)  great grandson and 1 dog    Family Caregiver if Needed child(sury), adult    Family Caregiver Names 3 daughter's- Eneida Grissom, Blue Aguirre & Azra Russo    Quality of Family Relationships helpful;involved;supportive    Able to Return to Prior Arrangements yes       Resource/Environmental Concerns    Resource/Environmental Concerns none    Transportation Concerns none       Food Insecurity    Within the past 12 months, you worried that your food would run out before you got the money to buy more. Never true    Within the past 12 months, the food you bought just didn't last and you didn't have money to get more. Never true       Transition Planning    Patient/Family Anticipates Transition to home with family    Patient/Family Anticipated Services at Transition none    Transportation Anticipated family or friend will provide       Discharge Needs Assessment    Readmission Within the Last 30 Days no previous admission in last 30 days    Equipment Currently Used at Home bp cuff;scales;glucometer    Concerns to be Addressed discharge planning    Anticipated Changes Related to Illness none    Equipment Needed After Discharge none    Discharge Facility/Level of Care Needs home with home health    Patient's Choice of Community Agency(s) Pt gave CCP permission to send  referrals to all three agencies that service [facility].                   Discharge Plan       Row Name 08/31/23 6989       Plan    Plan Home w/ HH (referrals pending)    Plan Comments CCP spoke with pleasant patient at bedside.  CCP role explained and discharge planning discussed.  Face sheet verified.  Pt scheduled for heart surgery tomorrow, 9/1/23.  Pt stated she is IADL's, retired and drives.  Pt lives in a single-story home with four entrance stair steps up on to her deck.  Pt has custody of her 12-year-old great grandson, Gaurang.  Pt reports PCP is, Jacqui Patterson.  Pt confirmed pharmacy is, [facility].  Pt has no local pharmacy.  Pt agrees to using [facility] upon discharge.  Pt denies use of past home health or going to a sub-acute rehab.  Pt has the following DME- glucometer, BP cuff monitor and scale.  Pt reports her daughter, Blue Aguirre ([phone]) and 21-year-old granddaughter/Kelly will be coming to stay with her once she discharges home.  Pt gave CCP permission to submit home health referrals to all agencies that service [facility].  CCP will continue to follow…….Hailey GRAHAM. /.                  Continued Care and Services - Admitted Since 8/31/2023    Coordination has not been started for this encounter.       Expected Discharge Date and Time       Expected Discharge Date Expected Discharge Time    Sep 7, 2023            Demographic Summary       Row Name 08/31/23 6360       General Information    Admission Type inpatient    Arrived From home    Required Notices Provided Important Message from Medicare    Referral Source admission list;physician    Reason for Consult discharge planning    Preferred Language English       Contact Information    Permission Granted to Share Info With family/designee                   Functional Status       Row Name 08/31/23 8619       Functional Status    Usual Activity Tolerance good    Current Activity  Tolerance good       Assessment of Health Literacy    Health Literacy Good       Functional Status, IADL    Medications independent    Meal Preparation independent    Housekeeping independent    Laundry independent    Shopping independent       Mental Status    General Appearance WDL WDL       Mental Status Summary    Recent Changes in Mental Status/Cognitive Functioning no changes       Employment/    Employment Status retired                   Psychosocial    No documentation.                  Abuse/Neglect    No documentation.                  Legal    No documentation.                  Substance Abuse    No documentation.                  Patient Forms    No documentation.                     Hailey Ladd RN

## 2023-08-31 NOTE — H&P
Name: Lennie Gomez ADMIT: 2023   : 1955  PCP: Jacqui Patterson APRN    MRN: [id number] LOS: 0 days   AGE/SEX: 68 y.o. female  ROOM: [facility]     CC: CAD    Subjective   History of Present Illness  Patient is a 68 y.o. female with a history of hypertension, CAD with previous MI in  requiring balloon angioplasty, hypertension, hyperlipidemia, and DM II. She is a current smoker, but denies ETOH or illicit drug use. She has left carotid stenosis and is undergoing work up for possible intervention. She presented to her cardiology for preoperative clearance. She underwent stress test on  which revealed mildly reduced EF of 42% and a lateral reversible perfusion defect consistent with ischemia. She subsequently underwent left heart catheterization which revealed multi-vessel CAD. She was referred to Dr. Aaron for surgical evaluation. He has reviewed the films and recommends surgical revasularization. She is being admitted for surgical work up.    Past Medical History:   Diagnosis Date    Arthritis     Coronary artery disease     Diabetes mellitus     Elevated cholesterol     Hypertension     Myocardial infarction      Past Surgical History:   Procedure Laterality Date    CARDIAC CATHETERIZATION      CARDIAC CATHETERIZATION N/A 2023    Procedure: Left Heart Cath with possible coronary angioplasty;  Surgeon: Surendra Sprague MD;  Location: [facility] CATH INVASIVE LOCATION;  Service: Cardiology;  Laterality: N/A;     SECTION       Family History   Problem Relation Age of Onset    Heart attack Mother      Social History     Tobacco Use    Smoking status: Every Day     Packs/day: 0.50     Years: 50.00     Pack years: 25.00     Types: Cigarettes    Smokeless tobacco: Never   Vaping Use    Vaping Use: Never used   Substance Use Topics    Alcohol use: Never    Drug use: Never     Medications Prior to Admission   Medication Sig Dispense Refill Last Dose    clopidogrel  (PLAVIX) 75 MG tablet Take 1 tablet by mouth Daily.   Past Week    ergocalciferol (ERGOCALCIFEROL) 1.25 MG (61720 UT) capsule Take 1 capsule by mouth 1 (One) Time Per Week.   Past Week    insulin detemir (Levemir FlexTouch) 100 UNIT/ML injection Inject 20 Units under the skin into the appropriate area as directed Daily.   Patient Taking Differently    Lantus SoloStar 100 UNIT/ML injection pen Lantus Solostar U-100 Insulin 100 unit/mL (3 mL) subcutaneous pen   8/30/2023    losartan-hydrochlorothiazide (HYZAAR) 100-12.5 MG per tablet Take 1 tablet by mouth Daily. 90 tablet 3 8/31/2023    metFORMIN (GLUCOPHAGE) 1000 MG tablet Take 1 tablet by mouth 2 (Two) Times a Day With Meals.   8/31/2023    metoprolol succinate XL (TOPROL-XL) 100 MG 24 hr tablet Take 1 tablet by mouth Daily.   8/31/2023    rosuvastatin (CRESTOR) 40 MG tablet Take 1 tablet by mouth Daily.   8/31/2023    venlafaxine (EFFEXOR) 75 MG tablet Take 1 tablet by mouth Daily.   8/31/2023     Allergies:  Theophylline    Review of Systems   Constitutional:  Negative for activity change and fatigue.   HENT:  Positive for dental problem and postnasal drip.    Respiratory:  Positive for cough. Negative for chest tightness and shortness of breath.    Cardiovascular:  Negative for chest pain, palpitations and leg swelling.   Gastrointestinal:  Negative for diarrhea, nausea and vomiting.   Genitourinary:  Negative for difficulty urinating, frequency and urgency.   Musculoskeletal:  Negative for arthralgias, back pain, gait problem and myalgias.   Skin:  Negative for rash and wound.   Neurological:  Negative for dizziness, seizures, syncope and numbness.   Hematological: Negative.    Psychiatric/Behavioral: Negative.        Objective    Vital Signs  Temp:  [98.5 °F (36.9 °C)] 98.5 °F (36.9 °C)  BP: (196)/(86) 196/86     on   ;      There is no height or weight on file to calculate BMI.    Physical Exam  Vitals reviewed.   Constitutional:       Appearance: Normal  appearance.   HENT:      Head: Normocephalic.      Nose: Nose normal.      Mouth/Throat:      Mouth: Mucous membranes are moist.      Pharynx: Oropharynx is clear.   Eyes:      Pupils: Pupils are equal, round, and reactive to light.   Cardiovascular:      Rate and Rhythm: Normal rate and regular rhythm.      Pulses: Normal pulses.      Heart sounds: Murmur heard.   Pulmonary:      Effort: Pulmonary effort is normal.      Breath sounds: Normal breath sounds.   Abdominal:      General: Bowel sounds are normal.      Palpations: Abdomen is soft.   Musculoskeletal:         General: Normal range of motion.      Cervical back: Normal range of motion and neck supple.      Right lower leg: No edema.      Left lower leg: No edema.   Skin:     General: Skin is warm and dry.      Capillary Refill: Capillary refill takes less than 2 seconds.      Coloration: Skin is not pale.      Findings: No erythema or rash.   Neurological:      General: No focal deficit present.      Mental Status: She is alert and oriented to person, place, and time. Mental status is at baseline.   Psychiatric:         Mood and Affect: Mood normal.         Behavior: Behavior normal.         Thought Content: Thought content normal.         Judgment: Judgment normal.       Results Review:  I reviewed the patient's new clinical results.    Assessment & Plan  - severe multi-vessel CAD with previous MI in 1996  - ICM--EF 42%; GDMT as tolerated  - hypertension  - hyperlipidemia--statin therapy  - DM II  - severe bilateral carotid stenosis; LD Plavix  8/22  - current tobacco use    Plan for CABG tomorrow morning with Dr. Aaron  Orders for preoperative testing placed  Questions answered with verbalized understanding    I discussed the patients findings and my recommendations with patient and family.    HERVE Garza  08/31/23  13:34 EDT

## 2023-08-31 NOTE — DISCHARGE PLACEMENT REQUEST
"Lennie Gomez (68 y.o. Female)       Date of Birth   1955    Social Security Number       Address   [address] [place] [id number]    Home Phone   [phone]    MRN   [id number]       [facility]    Marital Status                               Admission Date   8/31/23    Admission Type   Urgent    Admitting Provider   Damir Aaron MD    Attending Provider   Damir Aaron MD    Department, Room/Bed   [facility] CARDIOVASC UNIT, 2231/1       Discharge Date       Discharge Disposition       Discharge Destination                                 Attending Provider: Damir Aaron MD    Allergies: Theophylline    Isolation: None   Infection: None   Code Status: CPR    Ht: 157.5 cm (62\")   Wt: 63.5 kg (140 lb)    Admission Cmt: None   Principal Problem: CAD (coronary artery disease) [I25.10]                   Active Insurance as of 8/31/2023       Primary Coverage       Payor Plan Insurance Group Employer/Plan Group    HUMANA MEDICARE REPLACEMENT HU[employer]NA MEDICARE REPLACEMENT [id number]       Payor Plan Address Payor Plan Phone Number Payor Plan Fax Number Effective Dates    [address] [address] [phone]  1/1/2022 - None Entered    [facility] [address]         Subscriber Name Subscriber Birth Date Member ID       LENNIE GOMEZ 1955 [id number]                     Emergency Contacts        (Rel.) Home Phone Work Phone Mobile Phone    GABRIELLA TELLEZ (Daughter) [phone] -- [phone]    Azra Russo (Daughter) [phone] -- [phone]                "

## 2023-09-01 ENCOUNTER — ANESTHESIA EVENT (OUTPATIENT)
Dept: PERIOP | Facility: HOSPITAL | Age: 68
DRG: 236 | End: 2023-09-01
Payer: MEDICARE

## 2023-09-01 ENCOUNTER — ANCILLARY PROCEDURE (OUTPATIENT)
Dept: PERIOP | Facility: HOSPITAL | Age: 68
DRG: 236 | End: 2023-09-01
Payer: MEDICARE

## 2023-09-01 ENCOUNTER — ANESTHESIA (OUTPATIENT)
Dept: PERIOP | Facility: HOSPITAL | Age: 68
DRG: 236 | End: 2023-09-01
Payer: MEDICARE

## 2023-09-01 ENCOUNTER — APPOINTMENT (OUTPATIENT)
Dept: GENERAL RADIOLOGY | Facility: HOSPITAL | Age: 68
DRG: 236 | End: 2023-09-01
Payer: MEDICARE

## 2023-09-01 LAB
ACT BLD: 137 SECONDS (ref 82–152)
ACT BLD: 143 SECONDS (ref 82–152)
ACT BLD: 450 SECONDS (ref 82–152)
ACT BLD: 480 SECONDS (ref 82–152)
ACT BLD: 564 SECONDS (ref 82–152)
ALBUMIN SERPL-MCNC: 4 G/DL (ref 3.5–5.2)
ALBUMIN SERPL-MCNC: 4.7 G/DL (ref 3.5–5.2)
ANION GAP SERPL CALCULATED.3IONS-SCNC: 10.1 MMOL/L (ref 5–15)
ANION GAP SERPL CALCULATED.3IONS-SCNC: 14.2 MMOL/L (ref 5–15)
ANION GAP SERPL CALCULATED.3IONS-SCNC: 7 MMOL/L (ref 5–15)
APTT PPP: 30.9 SECONDS (ref 22.7–35.4)
ARTERIAL PATENCY WRIST A: ABNORMAL
ATMOSPHERIC PRESS: 752.1 MMHG
ATMOSPHERIC PRESS: 752.9 MMHG
ATMOSPHERIC PRESS: 752.9 MMHG
ATMOSPHERIC PRESS: 753.8 MMHG
BASE EXCESS BLDA CALC-SCNC: -1.1 MMOL/L (ref 0–2)
BASE EXCESS BLDA CALC-SCNC: -1.6 MMOL/L (ref 0–2)
BASE EXCESS BLDA CALC-SCNC: -2 MMOL/L (ref 0–2)
BASE EXCESS BLDA CALC-SCNC: 0 MMOL/L (ref -5–5)
BASE EXCESS BLDA CALC-SCNC: 0.4 MMOL/L (ref 0–2)
BASE EXCESS BLDA CALC-SCNC: 1 MMOL/L (ref -5–5)
BASE EXCESS BLDA CALC-SCNC: 1 MMOL/L (ref -5–5)
BASE EXCESS BLDA CALC-SCNC: 2 MMOL/L (ref -5–5)
BASE EXCESS BLDA CALC-SCNC: 3 MMOL/L (ref -5–5)
BASE EXCESS BLDA CALC-SCNC: 4 MMOL/L (ref -5–5)
BASOPHILS # BLD AUTO: 0.03 10*3/MM3 (ref 0–0.2)
BASOPHILS NFR BLD AUTO: 0.5 % (ref 0–1.5)
BDY SITE: ABNORMAL
BILIRUB UR QL STRIP: NEGATIVE
BUN SERPL-MCNC: 8 MG/DL (ref 8–23)
BUN SERPL-MCNC: 9 MG/DL (ref 8–23)
BUN SERPL-MCNC: 9 MG/DL (ref 8–23)
BUN/CREAT SERPL: 11.3 (ref 7–25)
BUN/CREAT SERPL: 12.7 (ref 7–25)
BUN/CREAT SERPL: 12.9 (ref 7–25)
CA-I BLD-MCNC: 5 MG/DL (ref 4.6–5.4)
CA-I BLDA-SCNC: ABNORMAL MMOL/L
CA-I SERPL ISE-MCNC: 1.26 MMOL/L (ref 1.15–1.35)
CALCIUM SPEC-SCNC: 8.9 MG/DL (ref 8.6–10.5)
CALCIUM SPEC-SCNC: 9.1 MG/DL (ref 8.6–10.5)
CALCIUM SPEC-SCNC: 9.2 MG/DL (ref 8.6–10.5)
CHLORIDE SERPL-SCNC: 102 MMOL/L (ref 98–107)
CHLORIDE SERPL-SCNC: 106 MMOL/L (ref 98–107)
CHLORIDE SERPL-SCNC: 111 MMOL/L (ref 98–107)
CLARITY UR: CLEAR
CO2 BLDA-SCNC: 26.4 MMOL/L (ref 23–27)
CO2 BLDA-SCNC: 27 MMOL/L (ref 23–27)
CO2 BLDA-SCNC: 27 MMOL/L (ref 24–29)
CO2 BLDA-SCNC: 27.6 MMOL/L (ref 23–27)
CO2 BLDA-SCNC: 28 MMOL/L (ref 23–27)
CO2 BLDA-SCNC: 28 MMOL/L (ref 24–29)
CO2 BLDA-SCNC: 28 MMOL/L (ref 24–29)
CO2 BLDA-SCNC: 29 MMOL/L (ref 24–29)
CO2 BLDA-SCNC: 30 MMOL/L (ref 24–29)
CO2 BLDA-SCNC: 30 MMOL/L (ref 24–29)
CO2 SERPL-SCNC: 22.8 MMOL/L (ref 22–29)
CO2 SERPL-SCNC: 24 MMOL/L (ref 22–29)
CO2 SERPL-SCNC: 26.9 MMOL/L (ref 22–29)
COLOR UR: YELLOW
CREAT SERPL-MCNC: 0.62 MG/DL (ref 0.57–1)
CREAT SERPL-MCNC: 0.71 MG/DL (ref 0.57–1)
CREAT SERPL-MCNC: 0.8 MG/DL (ref 0.57–1)
DEPRECATED RDW RBC AUTO: 43.3 FL (ref 37–54)
DEPRECATED RDW RBC AUTO: 43.5 FL (ref 37–54)
EGFRCR SERPLBLD CKD-EPI 2021: 80.9 ML/MIN/1.73
EGFRCR SERPLBLD CKD-EPI 2021: 93.3 ML/MIN/1.73
EGFRCR SERPLBLD CKD-EPI 2021: 97.7 ML/MIN/1.73
EOSINOPHIL # BLD AUTO: 0.04 10*3/MM3 (ref 0–0.4)
EOSINOPHIL NFR BLD AUTO: 0.7 % (ref 0.3–6.2)
ERYTHROCYTE [DISTWIDTH] IN BLOOD BY AUTOMATED COUNT: 12.7 % (ref 12.3–15.4)
ERYTHROCYTE [DISTWIDTH] IN BLOOD BY AUTOMATED COUNT: 12.8 % (ref 12.3–15.4)
FIBRINOGEN PPP-MCNC: 288 MG/DL (ref 219–464)
GLUCOSE BLDC GLUCOMTR-MCNC: 109 MG/DL (ref 70–130)
GLUCOSE BLDC GLUCOMTR-MCNC: 111 MG/DL (ref 70–130)
GLUCOSE BLDC GLUCOMTR-MCNC: 118 MG/DL (ref 70–130)
GLUCOSE BLDC GLUCOMTR-MCNC: 124 MG/DL (ref 70–130)
GLUCOSE BLDC GLUCOMTR-MCNC: 129 MG/DL (ref 70–130)
GLUCOSE BLDC GLUCOMTR-MCNC: 132 MG/DL (ref 70–130)
GLUCOSE BLDC GLUCOMTR-MCNC: 132 MG/DL (ref 70–130)
GLUCOSE BLDC GLUCOMTR-MCNC: 139 MG/DL (ref 70–130)
GLUCOSE BLDC GLUCOMTR-MCNC: 152 MG/DL (ref 70–130)
GLUCOSE BLDC GLUCOMTR-MCNC: 153 MG/DL (ref 70–130)
GLUCOSE BLDC GLUCOMTR-MCNC: 183 MG/DL (ref 70–130)
GLUCOSE BLDC GLUCOMTR-MCNC: 217 MG/DL (ref 70–130)
GLUCOSE BLDC GLUCOMTR-MCNC: 237 MG/DL (ref 70–130)
GLUCOSE BLDC GLUCOMTR-MCNC: 242 MG/DL (ref 70–130)
GLUCOSE BLDC GLUCOMTR-MCNC: 243 MG/DL (ref 70–130)
GLUCOSE BLDC GLUCOMTR-MCNC: 249 MG/DL (ref 70–130)
GLUCOSE BLDC GLUCOMTR-MCNC: 252 MG/DL (ref 70–130)
GLUCOSE BLDC GLUCOMTR-MCNC: 280 MG/DL (ref 70–130)
GLUCOSE BLDC GLUCOMTR-MCNC: 310 MG/DL (ref 70–130)
GLUCOSE SERPL-MCNC: 102 MG/DL (ref 65–99)
GLUCOSE SERPL-MCNC: 205 MG/DL (ref 65–99)
GLUCOSE SERPL-MCNC: 252 MG/DL (ref 65–99)
GLUCOSE UR STRIP-MCNC: ABNORMAL MG/DL
HCO3 BLDA-SCNC: 24.9 MMOL/L (ref 22–28)
HCO3 BLDA-SCNC: 25.3 MMOL/L (ref 22–28)
HCO3 BLDA-SCNC: 25.5 MMOL/L (ref 22–26)
HCO3 BLDA-SCNC: 26 MMOL/L (ref 22–26)
HCO3 BLDA-SCNC: 26.2 MMOL/L (ref 22–28)
HCO3 BLDA-SCNC: 26.3 MMOL/L (ref 22–28)
HCO3 BLDA-SCNC: 27 MMOL/L (ref 22–26)
HCO3 BLDA-SCNC: 27.2 MMOL/L (ref 22–26)
HCO3 BLDA-SCNC: 28.7 MMOL/L (ref 22–26)
HCO3 BLDA-SCNC: 28.9 MMOL/L (ref 22–26)
HCT VFR BLD AUTO: 29.9 % (ref 34–46.6)
HCT VFR BLD AUTO: 31.5 % (ref 34–46.6)
HCT VFR BLDA CALC: 26 % (ref 38–51)
HCT VFR BLDA CALC: 27 % (ref 38–51)
HCT VFR BLDA CALC: 28 % (ref 38–51)
HCT VFR BLDA CALC: 28 % (ref 38–51)
HCT VFR BLDA CALC: 32 % (ref 38–51)
HCT VFR BLDA CALC: 34 % (ref 38–51)
HEMODILUTION: NO
HGB BLD-MCNC: 10.3 G/DL (ref 12–15.9)
HGB BLD-MCNC: 9.9 G/DL (ref 12–15.9)
HGB BLDA-MCNC: 10.9 G/DL (ref 12–17)
HGB BLDA-MCNC: 11.6 G/DL (ref 12–17)
HGB BLDA-MCNC: 8.8 G/DL (ref 12–17)
HGB BLDA-MCNC: 9.2 G/DL (ref 12–17)
HGB BLDA-MCNC: 9.5 G/DL (ref 12–17)
HGB BLDA-MCNC: 9.5 G/DL (ref 12–17)
HGB UR QL STRIP.AUTO: NEGATIVE
IMM GRANULOCYTES # BLD AUTO: 0.03 10*3/MM3 (ref 0–0.05)
IMM GRANULOCYTES NFR BLD AUTO: 0.5 % (ref 0–0.5)
INHALED O2 CONCENTRATION: 100 %
INHALED O2 CONCENTRATION: 40 %
INR PPP: 1.32 (ref 0.9–1.1)
KETONES UR QL STRIP: NEGATIVE
LEUKOCYTE ESTERASE UR QL STRIP.AUTO: NEGATIVE
LYMPHOCYTES # BLD AUTO: 1.34 10*3/MM3 (ref 0.7–3.1)
LYMPHOCYTES NFR BLD AUTO: 22.4 % (ref 19.6–45.3)
Lab: ABNORMAL
Lab: ABNORMAL
MAGNESIUM SERPL-MCNC: 2.8 MG/DL (ref 1.6–2.4)
MAGNESIUM SERPL-MCNC: 2.9 MG/DL (ref 1.6–2.4)
MCH RBC QN AUTO: 30.7 PG (ref 26.6–33)
MCH RBC QN AUTO: 31 PG (ref 26.6–33)
MCHC RBC AUTO-ENTMCNC: 32.7 G/DL (ref 31.5–35.7)
MCHC RBC AUTO-ENTMCNC: 33.1 G/DL (ref 31.5–35.7)
MCV RBC AUTO: 93.7 FL (ref 79–97)
MCV RBC AUTO: 93.8 FL (ref 79–97)
MODALITY: ABNORMAL
MONOCYTES # BLD AUTO: 0.32 10*3/MM3 (ref 0.1–0.9)
MONOCYTES NFR BLD AUTO: 5.4 % (ref 5–12)
NEUTROPHILS NFR BLD AUTO: 4.22 10*3/MM3 (ref 1.7–7)
NEUTROPHILS NFR BLD AUTO: 70.5 % (ref 42.7–76)
NITRITE UR QL STRIP: NEGATIVE
NOTIFIED WHO: ABNORMAL
NOTIFIED WHO: ABNORMAL
NRBC BLD AUTO-RTO: 0 /100 WBC (ref 0–0.2)
O2 A-A PPRESDIFF RESPIRATORY: 0.3 MMHG
O2 A-A PPRESDIFF RESPIRATORY: 0.5 MMHG
O2 A-A PPRESDIFF RESPIRATORY: 0.5 MMHG
O2 A-A PPRESDIFF RESPIRATORY: 0.6 MMHG
PCO2 BLDA: 39.6 MM HG (ref 35–45)
PCO2 BLDA: 45.4 MM HG (ref 35–45)
PCO2 BLDA: 45.8 MM HG (ref 35–45)
PCO2 BLDA: 48.1 MM HG (ref 35–45)
PCO2 BLDA: 48.3 MM HG (ref 35–45)
PCO2 BLDA: 48.8 MM HG (ref 35–45)
PCO2 BLDA: 50.3 MM HG (ref 35–45)
PCO2 BLDA: 51.3 MM HG (ref 35–45)
PCO2 BLDA: 54.5 MM HG (ref 35–45)
PCO2 BLDA: 56.1 MM HG (ref 35–45)
PEEP RESPIRATORY: 7.5 CM[H2O]
PH BLDA: 7.28 PH UNITS (ref 7.35–7.45)
PH BLDA: 7.28 PH UNITS (ref 7.35–7.45)
PH BLDA: 7.32 PH UNITS (ref 7.35–7.45)
PH BLDA: 7.35 PH UNITS (ref 7.35–7.6)
PH BLDA: 7.37 PH UNITS (ref 7.35–7.45)
PH BLDA: 7.37 PH UNITS (ref 7.35–7.6)
PH BLDA: 7.38 PH UNITS (ref 7.35–7.6)
PH BLDA: 7.38 PH UNITS (ref 7.35–7.6)
PH BLDA: 7.42 PH UNITS (ref 7.35–7.6)
PH BLDA: 7.43 PH UNITS (ref 7.35–7.6)
PH UR STRIP.AUTO: 5.5 [PH] (ref 5–8)
PHOSPHATE SERPL-MCNC: 1.2 MG/DL (ref 2.5–4.5)
PHOSPHATE SERPL-MCNC: 1.2 MG/DL (ref 2.5–4.5)
PLATELET # BLD AUTO: 102 10*3/MM3 (ref 140–450)
PLATELET # BLD AUTO: 109 10*3/MM3 (ref 140–450)
PMV BLD AUTO: 10.5 FL (ref 6–12)
PMV BLD AUTO: 10.6 FL (ref 6–12)
PO2 BLDA: 104.3 MM HG (ref 80–100)
PO2 BLDA: 123.3 MM HG (ref 80–100)
PO2 BLDA: 42 MMHG (ref 80–105)
PO2 BLDA: 431.2 MM HG (ref 80–100)
PO2 BLDA: 441 MMHG (ref 80–105)
PO2 BLDA: 472 MMHG (ref 80–105)
PO2 BLDA: 481 MMHG (ref 80–105)
PO2 BLDA: 493 MMHG (ref 80–105)
PO2 BLDA: 513 MMHG (ref 80–105)
PO2 BLDA: 80.6 MM HG (ref 80–100)
POTASSIUM BLDA-SCNC: 3 MMOL/L (ref 3.5–4.9)
POTASSIUM BLDA-SCNC: 3.1 MMOL/L (ref 3.5–4.9)
POTASSIUM BLDA-SCNC: 3.4 MMOL/L (ref 3.5–4.9)
POTASSIUM BLDA-SCNC: 3.5 MMOL/L (ref 3.5–4.9)
POTASSIUM BLDA-SCNC: 3.5 MMOL/L (ref 3.5–4.9)
POTASSIUM BLDA-SCNC: 3.6 MMOL/L (ref 3.5–4.9)
POTASSIUM SERPL-SCNC: 3.3 MMOL/L (ref 3.5–5.2)
POTASSIUM SERPL-SCNC: 3.8 MMOL/L (ref 3.5–5.2)
POTASSIUM SERPL-SCNC: 3.9 MMOL/L (ref 3.5–5.2)
POTASSIUM SERPL-SCNC: 4.2 MMOL/L (ref 3.5–5.2)
PROT UR QL STRIP: NEGATIVE
PROTHROMBIN TIME: 16.6 SECONDS (ref 11.7–14.2)
PSV: 8 CMH2O
QT INTERVAL: 439 MS
QTC INTERVAL: 513 MS
RBC # BLD AUTO: 3.19 10*6/MM3 (ref 3.77–5.28)
RBC # BLD AUTO: 3.36 10*6/MM3 (ref 3.77–5.28)
READ BACK: ABNORMAL
READ BACK: YES
RESPIRATORY RATE: 15
RESPIRATORY RATE: 19
RESPIRATORY RATE: 20
SAO2 % BLDA: 100 % (ref 95–98)
SAO2 % BLDA: 73 % (ref 95–98)
SAO2 % BLDCOA: 100 % (ref 92–98.5)
SAO2 % BLDCOA: 94.6 % (ref 92–98.5)
SAO2 % BLDCOA: 97 % (ref 92–98.5)
SAO2 % BLDCOA: 98.2 % (ref 92–98.5)
SODIUM SERPL-SCNC: 139 MMOL/L (ref 136–145)
SODIUM SERPL-SCNC: 142 MMOL/L (ref 136–145)
SODIUM SERPL-SCNC: 143 MMOL/L (ref 136–145)
SP GR UR STRIP: 1.01 (ref 1–1.03)
TOTAL RATE: 18 BREATHS/MINUTE
UROBILINOGEN UR QL STRIP: ABNORMAL
VENTILATOR MODE: ABNORMAL
VENTILATOR MODE: AC
VT ON VENT VENT: 401 ML
VT ON VENT VENT: 425 ML
VT ON VENT VENT: 450 ML
VT ON VENT VENT: 500 ML
WBC NRBC COR # BLD: 5.98 10*3/MM3 (ref 3.4–10.8)
WBC NRBC COR # BLD: 6.85 10*3/MM3 (ref 3.4–10.8)

## 2023-09-01 PROCEDURE — 85384 FIBRINOGEN ACTIVITY: CPT | Performed by: NURSE PRACTITIONER

## 2023-09-01 PROCEDURE — 25010000002 HEPARIN (PORCINE) PER 1000 UNITS: Performed by: THORACIC SURGERY (CARDIOTHORACIC VASCULAR SURGERY)

## 2023-09-01 PROCEDURE — 25010000002 HYDROCORTISONE SOD SUC (PF) 100 MG RECONSTITUTED SOLUTION: Performed by: ANESTHESIOLOGY

## 2023-09-01 PROCEDURE — 82947 ASSAY GLUCOSE BLOOD QUANT: CPT

## 2023-09-01 PROCEDURE — 25010000002 ALBUMIN HUMAN 5% PER 50 ML: Performed by: NURSE PRACTITIONER

## 2023-09-01 PROCEDURE — 25010000002 ACETAMINOPHEN 10 MG/ML SOLUTION: Performed by: NURSE PRACTITIONER

## 2023-09-01 PROCEDURE — C1713 ANCHOR/SCREW BN/BN,TIS/BN: HCPCS | Performed by: THORACIC SURGERY (CARDIOTHORACIC VASCULAR SURGERY)

## 2023-09-01 PROCEDURE — 85730 THROMBOPLASTIN TIME PARTIAL: CPT | Performed by: NURSE PRACTITIONER

## 2023-09-01 PROCEDURE — 94799 UNLISTED PULMONARY SVC/PX: CPT

## 2023-09-01 PROCEDURE — 25010000002 CEFAZOLIN IN DEXTROSE 2-4 GM/100ML-% SOLUTION: Performed by: NURSE PRACTITIONER

## 2023-09-01 PROCEDURE — 81003 URINALYSIS AUTO W/O SCOPE: CPT | Performed by: NURSE PRACTITIONER

## 2023-09-01 PROCEDURE — 80069 RENAL FUNCTION PANEL: CPT | Performed by: NURSE PRACTITIONER

## 2023-09-01 PROCEDURE — 82803 BLOOD GASES ANY COMBINATION: CPT

## 2023-09-01 PROCEDURE — 25010000002 METOCLOPRAMIDE PER 10 MG: Performed by: NURSE PRACTITIONER

## 2023-09-01 PROCEDURE — 021209W BYPASS CORONARY ARTERY, THREE ARTERIES FROM AORTA WITH AUTOLOGOUS VENOUS TISSUE, OPEN APPROACH: ICD-10-PCS | Performed by: THORACIC SURGERY (CARDIOTHORACIC VASCULAR SURGERY)

## 2023-09-01 PROCEDURE — 02100Z9 BYPASS CORONARY ARTERY, ONE ARTERY FROM LEFT INTERNAL MAMMARY, OPEN APPROACH: ICD-10-PCS | Performed by: THORACIC SURGERY (CARDIOTHORACIC VASCULAR SURGERY)

## 2023-09-01 PROCEDURE — 25010000002 MAGNESIUM SULFATE 2 GM/50ML SOLUTION: Performed by: NURSE PRACTITIONER

## 2023-09-01 PROCEDURE — 25010000002 PHENYLEPHRINE 10 MG/ML SOLUTION 5 ML VIAL: Performed by: ANESTHESIOLOGY

## 2023-09-01 PROCEDURE — 63710000001 INSULIN REGULAR HUMAN PER 5 UNITS: Performed by: ANESTHESIOLOGY

## 2023-09-01 PROCEDURE — 85347 COAGULATION TIME ACTIVATED: CPT

## 2023-09-01 PROCEDURE — 25010000002 MORPHINE PER 10 MG: Performed by: NURSE PRACTITIONER

## 2023-09-01 PROCEDURE — 85014 HEMATOCRIT: CPT

## 2023-09-01 PROCEDURE — 25010000002 CEFAZOLIN IN DEXTROSE 2000 MG/ 100 ML SOLUTION: Performed by: NURSE PRACTITIONER

## 2023-09-01 PROCEDURE — 93005 ELECTROCARDIOGRAM TRACING: CPT | Performed by: NURSE PRACTITIONER

## 2023-09-01 PROCEDURE — C1751 CATH, INF, PER/CENT/MIDLINE: HCPCS | Performed by: ANESTHESIOLOGY

## 2023-09-01 PROCEDURE — 86900 BLOOD TYPING SEROLOGIC ABO: CPT

## 2023-09-01 PROCEDURE — C1729 CATH, DRAINAGE: HCPCS | Performed by: THORACIC SURGERY (CARDIOTHORACIC VASCULAR SURGERY)

## 2023-09-01 PROCEDURE — 25010000002 MIDAZOLAM PER 1 MG: Performed by: ANESTHESIOLOGY

## 2023-09-01 PROCEDURE — 84132 ASSAY OF SERUM POTASSIUM: CPT | Performed by: THORACIC SURGERY (CARDIOTHORACIC VASCULAR SURGERY)

## 2023-09-01 PROCEDURE — 86901 BLOOD TYPING SEROLOGIC RH(D): CPT

## 2023-09-01 PROCEDURE — 33533 CABG ARTERIAL SINGLE: CPT

## 2023-09-01 PROCEDURE — 85027 COMPLETE CBC AUTOMATED: CPT | Performed by: NURSE PRACTITIONER

## 2023-09-01 PROCEDURE — 71045 X-RAY EXAM CHEST 1 VIEW: CPT

## 2023-09-01 PROCEDURE — 82948 REAGENT STRIP/BLOOD GLUCOSE: CPT

## 2023-09-01 PROCEDURE — P9041 ALBUMIN (HUMAN),5%, 50ML: HCPCS | Performed by: NURSE PRACTITIONER

## 2023-09-01 PROCEDURE — 25010000002 PAPAVERINE PER 60 MG: Performed by: THORACIC SURGERY (CARDIOTHORACIC VASCULAR SURGERY)

## 2023-09-01 PROCEDURE — 85610 PROTHROMBIN TIME: CPT | Performed by: NURSE PRACTITIONER

## 2023-09-01 PROCEDURE — 5A1221Z PERFORMANCE OF CARDIAC OUTPUT, CONTINUOUS: ICD-10-PCS | Performed by: THORACIC SURGERY (CARDIOTHORACIC VASCULAR SURGERY)

## 2023-09-01 PROCEDURE — 94002 VENT MGMT INPAT INIT DAY: CPT

## 2023-09-01 PROCEDURE — 25010000002 HEPARIN (PORCINE) PER 1000 UNITS: Performed by: ANESTHESIOLOGY

## 2023-09-01 PROCEDURE — 80048 BASIC METABOLIC PNL TOTAL CA: CPT | Performed by: NURSE PRACTITIONER

## 2023-09-01 PROCEDURE — 33508 ENDOSCOPIC VEIN HARVEST: CPT | Performed by: THORACIC SURGERY (CARDIOTHORACIC VASCULAR SURGERY)

## 2023-09-01 PROCEDURE — 25010000002 PROPOFOL 10 MG/ML EMULSION: Performed by: ANESTHESIOLOGY

## 2023-09-01 PROCEDURE — 82330 ASSAY OF CALCIUM: CPT | Performed by: NURSE PRACTITIONER

## 2023-09-01 PROCEDURE — 33508 ENDOSCOPIC VEIN HARVEST: CPT

## 2023-09-01 PROCEDURE — A4648 IMPLANTABLE TISSUE MARKER: HCPCS | Performed by: THORACIC SURGERY (CARDIOTHORACIC VASCULAR SURGERY)

## 2023-09-01 PROCEDURE — 93010 ELECTROCARDIOGRAM REPORT: CPT | Performed by: INTERNAL MEDICINE

## 2023-09-01 PROCEDURE — 25010000002 FENTANYL CITRATE (PF) 250 MCG/5ML SOLUTION: Performed by: ANESTHESIOLOGY

## 2023-09-01 PROCEDURE — 33519 CABG ARTERY-VEIN THREE: CPT

## 2023-09-01 PROCEDURE — 3E080GC INTRODUCTION OF OTHER THERAPEUTIC SUBSTANCE INTO HEART, OPEN APPROACH: ICD-10-PCS | Performed by: THORACIC SURGERY (CARDIOTHORACIC VASCULAR SURGERY)

## 2023-09-01 PROCEDURE — 85025 COMPLETE CBC W/AUTO DIFF WBC: CPT | Performed by: NURSE PRACTITIONER

## 2023-09-01 PROCEDURE — 0 POTASSIUM CHLORIDE PER 2 MEQ: Performed by: THORACIC SURGERY (CARDIOTHORACIC VASCULAR SURGERY)

## 2023-09-01 PROCEDURE — 85018 HEMOGLOBIN: CPT

## 2023-09-01 PROCEDURE — 25010000002 PROTAMINE SULFATE PER 10 MG: Performed by: ANESTHESIOLOGY

## 2023-09-01 PROCEDURE — 83735 ASSAY OF MAGNESIUM: CPT | Performed by: NURSE PRACTITIONER

## 2023-09-01 PROCEDURE — 33533 CABG ARTERIAL SINGLE: CPT | Performed by: THORACIC SURGERY (CARDIOTHORACIC VASCULAR SURGERY)

## 2023-09-01 PROCEDURE — 06BP4ZZ EXCISION OF RIGHT SAPHENOUS VEIN, PERCUTANEOUS ENDOSCOPIC APPROACH: ICD-10-PCS | Performed by: THORACIC SURGERY (CARDIOTHORACIC VASCULAR SURGERY)

## 2023-09-01 PROCEDURE — 25010000002 MAGNESIUM SULFATE PER 500 MG OF MAGNESIUM: Performed by: ANESTHESIOLOGY

## 2023-09-01 PROCEDURE — 33519 CABG ARTERY-VEIN THREE: CPT | Performed by: THORACIC SURGERY (CARDIOTHORACIC VASCULAR SURGERY)

## 2023-09-01 DEVICE — SS SUTURE, 4 PER SLEEVE
Type: IMPLANTABLE DEVICE | Site: STERNUM | Status: FUNCTIONAL
Brand: MYO/WIRE II

## 2023-09-01 DEVICE — SS SUTURE, 3 PER SLEEVE
Type: IMPLANTABLE DEVICE | Site: STERNUM | Status: FUNCTIONAL
Brand: MYO/WIRE II

## 2023-09-01 RX ORDER — ACETAMINOPHEN 650 MG/1
650 SUPPOSITORY RECTAL EVERY 4 HOURS
Status: ACTIVE | OUTPATIENT
Start: 2023-09-01 | End: 2023-09-02

## 2023-09-01 RX ORDER — DEXMEDETOMIDINE HYDROCHLORIDE 4 UG/ML
.2-1.5 INJECTION, SOLUTION INTRAVENOUS
Status: DISCONTINUED | OUTPATIENT
Start: 2023-09-01 | End: 2023-09-06

## 2023-09-01 RX ORDER — IBUPROFEN 600 MG/1
1 TABLET ORAL
Status: DISCONTINUED | OUTPATIENT
Start: 2023-09-01 | End: 2023-09-02

## 2023-09-01 RX ORDER — PROTAMINE SULFATE 10 MG/ML
INJECTION, SOLUTION INTRAVENOUS AS NEEDED
Status: DISCONTINUED | OUTPATIENT
Start: 2023-09-01 | End: 2023-09-01 | Stop reason: SURG

## 2023-09-01 RX ORDER — DEXTROSE MONOHYDRATE 25 G/50ML
10-50 INJECTION, SOLUTION INTRAVENOUS
Status: DISCONTINUED | OUTPATIENT
Start: 2023-09-01 | End: 2023-09-02

## 2023-09-01 RX ORDER — ACETAMINOPHEN 325 MG/1
650 TABLET ORAL EVERY 4 HOURS PRN
Status: DISCONTINUED | OUTPATIENT
Start: 2023-09-02 | End: 2023-09-08 | Stop reason: HOSPADM

## 2023-09-01 RX ORDER — NICOTINE POLACRILEX 4 MG
15 LOZENGE BUCCAL
Status: DISCONTINUED | OUTPATIENT
Start: 2023-09-01 | End: 2023-09-02

## 2023-09-01 RX ORDER — HEPARIN SODIUM 1000 [USP'U]/ML
INJECTION, SOLUTION INTRAVENOUS; SUBCUTANEOUS AS NEEDED
Status: DISCONTINUED | OUTPATIENT
Start: 2023-09-01 | End: 2023-09-01 | Stop reason: SURG

## 2023-09-01 RX ORDER — BISACODYL 10 MG
10 SUPPOSITORY, RECTAL RECTAL DAILY PRN
Status: DISCONTINUED | OUTPATIENT
Start: 2023-09-02 | End: 2023-09-08 | Stop reason: HOSPADM

## 2023-09-01 RX ORDER — HYDROCODONE BITARTRATE AND ACETAMINOPHEN 5; 325 MG/1; MG/1
2 TABLET ORAL EVERY 4 HOURS PRN
Status: DISCONTINUED | OUTPATIENT
Start: 2023-09-01 | End: 2023-09-08 | Stop reason: HOSPADM

## 2023-09-01 RX ORDER — OXYCODONE HYDROCHLORIDE 5 MG/1
10 TABLET ORAL EVERY 4 HOURS PRN
Status: DISCONTINUED | OUTPATIENT
Start: 2023-09-01 | End: 2023-09-08 | Stop reason: HOSPADM

## 2023-09-01 RX ORDER — ALUMINA, MAGNESIA, AND SIMETHICONE 2400; 2400; 240 MG/30ML; MG/30ML; MG/30ML
15 SUSPENSION ORAL EVERY 6 HOURS PRN
Status: DISCONTINUED | OUTPATIENT
Start: 2023-09-01 | End: 2023-09-08 | Stop reason: HOSPADM

## 2023-09-01 RX ORDER — BISACODYL 5 MG/1
10 TABLET, DELAYED RELEASE ORAL DAILY PRN
Status: DISCONTINUED | OUTPATIENT
Start: 2023-09-01 | End: 2023-09-08 | Stop reason: HOSPADM

## 2023-09-01 RX ORDER — ACETAMINOPHEN 10 MG/ML
1000 INJECTION, SOLUTION INTRAVENOUS EVERY 8 HOURS
Status: COMPLETED | OUTPATIENT
Start: 2023-09-01 | End: 2023-09-02

## 2023-09-01 RX ORDER — MAGNESIUM SULFATE HEPTAHYDRATE 500 MG/ML
INJECTION, SOLUTION INTRAMUSCULAR; INTRAVENOUS AS NEEDED
Status: DISCONTINUED | OUTPATIENT
Start: 2023-09-01 | End: 2023-09-01 | Stop reason: SURG

## 2023-09-01 RX ORDER — ONDANSETRON 2 MG/ML
4 INJECTION INTRAMUSCULAR; INTRAVENOUS EVERY 6 HOURS PRN
Status: DISCONTINUED | OUTPATIENT
Start: 2023-09-01 | End: 2023-09-08 | Stop reason: HOSPADM

## 2023-09-01 RX ORDER — ROCURONIUM BROMIDE 10 MG/ML
INJECTION, SOLUTION INTRAVENOUS AS NEEDED
Status: DISCONTINUED | OUTPATIENT
Start: 2023-09-01 | End: 2023-09-01 | Stop reason: SURG

## 2023-09-01 RX ORDER — CHLORHEXIDINE GLUCONATE 0.12 MG/ML
15 RINSE ORAL EVERY 12 HOURS
Status: DISCONTINUED | OUTPATIENT
Start: 2023-09-01 | End: 2023-09-04

## 2023-09-01 RX ORDER — HEPARIN SODIUM 5000 [USP'U]/ML
INJECTION, SOLUTION INTRAVENOUS; SUBCUTANEOUS AS NEEDED
Status: DISCONTINUED | OUTPATIENT
Start: 2023-09-01 | End: 2023-09-01 | Stop reason: HOSPADM

## 2023-09-01 RX ORDER — PHENYLEPHRINE HCL IN 0.9% NACL 0.5 MG/5ML
SYRINGE (ML) INTRAVENOUS AS NEEDED
Status: DISCONTINUED | OUTPATIENT
Start: 2023-09-01 | End: 2023-09-01 | Stop reason: SURG

## 2023-09-01 RX ORDER — ACETAMINOPHEN 160 MG/5ML
650 SOLUTION ORAL EVERY 4 HOURS PRN
Status: DISCONTINUED | OUTPATIENT
Start: 2023-09-02 | End: 2023-09-08 | Stop reason: HOSPADM

## 2023-09-01 RX ORDER — LIDOCAINE HYDROCHLORIDE 20 MG/ML
INJECTION, SOLUTION INFILTRATION; PERINEURAL AS NEEDED
Status: DISCONTINUED | OUTPATIENT
Start: 2023-09-01 | End: 2023-09-01 | Stop reason: SURG

## 2023-09-01 RX ORDER — ACETAMINOPHEN 160 MG/5ML
650 SOLUTION ORAL EVERY 4 HOURS
Status: ACTIVE | OUTPATIENT
Start: 2023-09-01 | End: 2023-09-02

## 2023-09-01 RX ORDER — ATORVASTATIN CALCIUM 20 MG/1
40 TABLET, FILM COATED ORAL NIGHTLY
Status: DISCONTINUED | OUTPATIENT
Start: 2023-09-01 | End: 2023-09-03

## 2023-09-01 RX ORDER — ASPIRIN 81 MG/1
81 TABLET ORAL DAILY
Status: DISCONTINUED | OUTPATIENT
Start: 2023-09-02 | End: 2023-09-08 | Stop reason: HOSPADM

## 2023-09-01 RX ORDER — MEPERIDINE HYDROCHLORIDE 25 MG/ML
25 INJECTION INTRAMUSCULAR; INTRAVENOUS; SUBCUTANEOUS EVERY 4 HOURS PRN
Status: ACTIVE | OUTPATIENT
Start: 2023-09-01 | End: 2023-09-01

## 2023-09-01 RX ORDER — NICARDIPINE HYDROCHLORIDE 2.5 MG/ML
INJECTION INTRAVENOUS AS NEEDED
Status: DISCONTINUED | OUTPATIENT
Start: 2023-09-01 | End: 2023-09-01 | Stop reason: SURG

## 2023-09-01 RX ORDER — MORPHINE SULFATE 2 MG/ML
1 INJECTION, SOLUTION INTRAMUSCULAR; INTRAVENOUS EVERY 4 HOURS PRN
Status: DISPENSED | OUTPATIENT
Start: 2023-09-01 | End: 2023-09-08

## 2023-09-01 RX ORDER — CEFAZOLIN SODIUM 2 G/100ML
2 INJECTION, SOLUTION INTRAVENOUS EVERY 8 HOURS
Status: COMPLETED | OUTPATIENT
Start: 2023-09-01 | End: 2023-09-02

## 2023-09-01 RX ORDER — FENTANYL CITRATE 50 UG/ML
INJECTION, SOLUTION INTRAMUSCULAR; INTRAVENOUS AS NEEDED
Status: DISCONTINUED | OUTPATIENT
Start: 2023-09-01 | End: 2023-09-01 | Stop reason: SURG

## 2023-09-01 RX ORDER — DOPAMINE HYDROCHLORIDE 160 MG/100ML
2-20 INJECTION, SOLUTION INTRAVENOUS CONTINUOUS PRN
Status: DISCONTINUED | OUTPATIENT
Start: 2023-09-01 | End: 2023-09-06

## 2023-09-01 RX ORDER — AMOXICILLIN 250 MG
2 CAPSULE ORAL NIGHTLY
Status: DISCONTINUED | OUTPATIENT
Start: 2023-09-02 | End: 2023-09-08 | Stop reason: HOSPADM

## 2023-09-01 RX ORDER — POTASSIUM CHLORIDE 29.8 MG/ML
20 INJECTION INTRAVENOUS ONCE
Status: DISCONTINUED | OUTPATIENT
Start: 2023-09-01 | End: 2023-09-01

## 2023-09-01 RX ORDER — CYCLOBENZAPRINE HCL 10 MG
10 TABLET ORAL EVERY 8 HOURS PRN
Status: DISCONTINUED | OUTPATIENT
Start: 2023-09-02 | End: 2023-09-08 | Stop reason: HOSPADM

## 2023-09-01 RX ORDER — NALOXONE HCL 0.4 MG/ML
0.4 VIAL (ML) INJECTION
Status: DISCONTINUED | OUTPATIENT
Start: 2023-09-01 | End: 2023-09-08 | Stop reason: HOSPADM

## 2023-09-01 RX ORDER — ACETAMINOPHEN 325 MG/1
650 TABLET ORAL EVERY 4 HOURS
Status: ACTIVE | OUTPATIENT
Start: 2023-09-01 | End: 2023-09-02

## 2023-09-01 RX ORDER — NITROGLYCERIN 0.4 MG/1
0.4 TABLET SUBLINGUAL
Status: DISCONTINUED | OUTPATIENT
Start: 2023-09-01 | End: 2023-09-08 | Stop reason: HOSPADM

## 2023-09-01 RX ORDER — MIDAZOLAM HYDROCHLORIDE 1 MG/ML
INJECTION INTRAMUSCULAR; INTRAVENOUS AS NEEDED
Status: DISCONTINUED | OUTPATIENT
Start: 2023-09-01 | End: 2023-09-01 | Stop reason: SURG

## 2023-09-01 RX ORDER — ALBUMIN, HUMAN INJ 5% 5 %
1000 SOLUTION INTRAVENOUS AS NEEDED
Status: DISPENSED | OUTPATIENT
Start: 2023-09-01 | End: 2023-09-02

## 2023-09-01 RX ORDER — PHENYLEPHRINE HCL IN 0.9% NACL 0.5 MG/5ML
.2-2 SYRINGE (ML) INTRAVENOUS CONTINUOUS PRN
Status: DISCONTINUED | OUTPATIENT
Start: 2023-09-01 | End: 2023-09-06

## 2023-09-01 RX ORDER — METOCLOPRAMIDE HYDROCHLORIDE 5 MG/ML
10 INJECTION INTRAMUSCULAR; INTRAVENOUS EVERY 6 HOURS
Status: DISPENSED | OUTPATIENT
Start: 2023-09-01 | End: 2023-09-02

## 2023-09-01 RX ORDER — ENOXAPARIN SODIUM 100 MG/ML
40 INJECTION SUBCUTANEOUS DAILY
Status: DISCONTINUED | OUTPATIENT
Start: 2023-09-02 | End: 2023-09-08 | Stop reason: HOSPADM

## 2023-09-01 RX ORDER — POTASSIUM CHLORIDE 29.8 MG/ML
20 INJECTION INTRAVENOUS
Status: COMPLETED | OUTPATIENT
Start: 2023-09-01 | End: 2023-09-01

## 2023-09-01 RX ORDER — MIDAZOLAM HYDROCHLORIDE 1 MG/ML
2 INJECTION INTRAMUSCULAR; INTRAVENOUS
Status: DISCONTINUED | OUTPATIENT
Start: 2023-09-01 | End: 2023-09-06

## 2023-09-01 RX ORDER — ALPRAZOLAM 0.25 MG/1
0.25 TABLET ORAL EVERY 8 HOURS PRN
Status: DISCONTINUED | OUTPATIENT
Start: 2023-09-01 | End: 2023-09-08 | Stop reason: HOSPADM

## 2023-09-01 RX ORDER — PAPAVERINE HYDROCHLORIDE 30 MG/ML
INJECTION INTRAMUSCULAR; INTRAVENOUS AS NEEDED
Status: DISCONTINUED | OUTPATIENT
Start: 2023-09-01 | End: 2023-09-01 | Stop reason: HOSPADM

## 2023-09-01 RX ORDER — SODIUM CHLORIDE 9 MG/ML
INJECTION, SOLUTION INTRAVENOUS CONTINUOUS PRN
Status: DISCONTINUED | OUTPATIENT
Start: 2023-09-01 | End: 2023-09-01 | Stop reason: SURG

## 2023-09-01 RX ORDER — ACETAMINOPHEN 650 MG/1
650 SUPPOSITORY RECTAL EVERY 4 HOURS PRN
Status: DISCONTINUED | OUTPATIENT
Start: 2023-09-02 | End: 2023-09-08 | Stop reason: HOSPADM

## 2023-09-01 RX ORDER — NOREPINEPHRINE BITARTRATE 0.03 MG/ML
.02-.2 INJECTION, SOLUTION INTRAVENOUS CONTINUOUS PRN
Status: DISCONTINUED | OUTPATIENT
Start: 2023-09-01 | End: 2023-09-06

## 2023-09-01 RX ORDER — POLYETHYLENE GLYCOL 3350 17 G/17G
17 POWDER, FOR SOLUTION ORAL DAILY PRN
Status: DISCONTINUED | OUTPATIENT
Start: 2023-09-01 | End: 2023-09-08 | Stop reason: HOSPADM

## 2023-09-01 RX ORDER — POTASSIUM CHLORIDE 29.8 MG/ML
20 INJECTION INTRAVENOUS ONCE
Status: COMPLETED | OUTPATIENT
Start: 2023-09-01 | End: 2023-09-01

## 2023-09-01 RX ORDER — PANTOPRAZOLE SODIUM 40 MG/10ML
40 INJECTION, POWDER, LYOPHILIZED, FOR SOLUTION INTRAVENOUS ONCE
Status: COMPLETED | OUTPATIENT
Start: 2023-09-01 | End: 2023-09-01

## 2023-09-01 RX ORDER — AMINOCAPROIC ACID 250 MG/ML
INJECTION, SOLUTION INTRAVENOUS AS NEEDED
Status: DISCONTINUED | OUTPATIENT
Start: 2023-09-01 | End: 2023-09-01 | Stop reason: SURG

## 2023-09-01 RX ORDER — MAGNESIUM SULFATE HEPTAHYDRATE 40 MG/ML
2 INJECTION, SOLUTION INTRAVENOUS EVERY 8 HOURS
Status: COMPLETED | OUTPATIENT
Start: 2023-09-01 | End: 2023-09-02

## 2023-09-01 RX ORDER — PANTOPRAZOLE SODIUM 40 MG/1
40 TABLET, DELAYED RELEASE ORAL EVERY MORNING
Status: DISCONTINUED | OUTPATIENT
Start: 2023-09-02 | End: 2023-09-08 | Stop reason: HOSPADM

## 2023-09-01 RX ORDER — MORPHINE SULFATE 2 MG/ML
4 INJECTION, SOLUTION INTRAMUSCULAR; INTRAVENOUS
Status: DISCONTINUED | OUTPATIENT
Start: 2023-09-01 | End: 2023-09-06

## 2023-09-01 RX ORDER — NITROGLYCERIN 20 MG/100ML
5-200 INJECTION INTRAVENOUS
Status: DISCONTINUED | OUTPATIENT
Start: 2023-09-01 | End: 2023-09-06

## 2023-09-01 RX ORDER — SODIUM CHLORIDE 9 MG/ML
30 INJECTION, SOLUTION INTRAVENOUS CONTINUOUS
Status: DISCONTINUED | OUTPATIENT
Start: 2023-09-01 | End: 2023-09-08 | Stop reason: HOSPADM

## 2023-09-01 RX ORDER — PROPOFOL 10 MG/ML
VIAL (ML) INTRAVENOUS AS NEEDED
Status: DISCONTINUED | OUTPATIENT
Start: 2023-09-01 | End: 2023-09-01 | Stop reason: SURG

## 2023-09-01 RX ORDER — MILRINONE LACTATE 0.2 MG/ML
.25-.375 INJECTION, SOLUTION INTRAVENOUS CONTINUOUS PRN
Status: DISCONTINUED | OUTPATIENT
Start: 2023-09-01 | End: 2023-09-06

## 2023-09-01 RX ADMIN — SODIUM CHLORIDE 0.5 MCG/KG/HR: 9 INJECTION, SOLUTION INTRAVENOUS at 07:04

## 2023-09-01 RX ADMIN — FENTANYL CITRATE 150 MCG: 50 INJECTION INTRAMUSCULAR; INTRAVENOUS at 09:18

## 2023-09-01 RX ADMIN — MORPHINE SULFATE 2 MG: 2 INJECTION, SOLUTION INTRAMUSCULAR; INTRAVENOUS at 15:13

## 2023-09-01 RX ADMIN — 0.12% CHLORHEXIDINE GLUCONATE 15 ML: 1.2 RINSE ORAL at 00:02

## 2023-09-01 RX ADMIN — LIDOCAINE HYDROCHLORIDE 60 MG: 20 INJECTION, SOLUTION INFILTRATION; PERINEURAL at 06:40

## 2023-09-01 RX ADMIN — POTASSIUM CHLORIDE 20 MEQ: 29.8 INJECTION, SOLUTION INTRAVENOUS at 21:21

## 2023-09-01 RX ADMIN — AMINOCAPROIC ACID 10 G: 250 INJECTION, SOLUTION INTRAVENOUS at 09:18

## 2023-09-01 RX ADMIN — MUPIROCIN 1 APPLICATION: 20 OINTMENT TOPICAL at 11:44

## 2023-09-01 RX ADMIN — AMINOCAPROIC ACID 10 G: 250 INJECTION, SOLUTION INTRAVENOUS at 08:03

## 2023-09-01 RX ADMIN — MAGNESIUM SULFATE HEPTAHYDRATE 2 G: 500 INJECTION, SOLUTION INTRAMUSCULAR; INTRAVENOUS at 09:11

## 2023-09-01 RX ADMIN — MORPHINE SULFATE 1 MG: 2 INJECTION, SOLUTION INTRAMUSCULAR; INTRAVENOUS at 20:33

## 2023-09-01 RX ADMIN — MORPHINE SULFATE 2 MG: 2 INJECTION, SOLUTION INTRAMUSCULAR; INTRAVENOUS at 14:16

## 2023-09-01 RX ADMIN — MUPIROCIN 1 APPLICATION: 20 OINTMENT TOPICAL at 00:02

## 2023-09-01 RX ADMIN — MIDAZOLAM 2 MG: 1 INJECTION INTRAMUSCULAR; INTRAVENOUS at 06:34

## 2023-09-01 RX ADMIN — MAGNESIUM SULFATE HEPTAHYDRATE 2 G: 40 INJECTION, SOLUTION INTRAVENOUS at 11:43

## 2023-09-01 RX ADMIN — PHENYLEPHRINE HYDROCHLORIDE 0.7 MCG/KG/MIN: 10 INJECTION, SOLUTION INTRAVENOUS at 08:05

## 2023-09-01 RX ADMIN — PROPOFOL 25 MCG/KG/MIN: 10 INJECTION, EMULSION INTRAVENOUS at 07:04

## 2023-09-01 RX ADMIN — CEFAZOLIN SODIUM 2 G: 2 INJECTION, SOLUTION INTRAVENOUS at 14:50

## 2023-09-01 RX ADMIN — POTASSIUM CHLORIDE 20 MEQ: 29.8 INJECTION, SOLUTION INTRAVENOUS at 11:20

## 2023-09-01 RX ADMIN — INSULIN HUMAN 3 UNITS: 100 INJECTION, SOLUTION PARENTERAL at 08:14

## 2023-09-01 RX ADMIN — HYDROCORTISONE SODIUM SUCCINATE 100 MG: 100 INJECTION, POWDER, FOR SOLUTION INTRAMUSCULAR; INTRAVENOUS at 07:00

## 2023-09-01 RX ADMIN — ROCURONIUM BROMIDE 30 MG: 10 INJECTION, SOLUTION INTRAVENOUS at 08:57

## 2023-09-01 RX ADMIN — PROTAMINE SULFATE 200 MG: 10 INJECTION, SOLUTION INTRAVENOUS at 09:16

## 2023-09-01 RX ADMIN — Medication 100 MCG: at 06:52

## 2023-09-01 RX ADMIN — 0.12% CHLORHEXIDINE GLUCONATE 15 ML: 1.2 RINSE ORAL at 11:44

## 2023-09-01 RX ADMIN — METOCLOPRAMIDE 10 MG: 5 INJECTION, SOLUTION INTRAMUSCULAR; INTRAVENOUS at 22:06

## 2023-09-01 RX ADMIN — ACETAMINOPHEN 1000 MG: 10 INJECTION, SOLUTION INTRAVENOUS at 18:06

## 2023-09-01 RX ADMIN — POTASSIUM CHLORIDE 20 MEQ: 29.8 INJECTION, SOLUTION INTRAVENOUS at 12:22

## 2023-09-01 RX ADMIN — NICARDIPINE HYDROCHLORIDE 0.2 MG: 25 INJECTION, SOLUTION INTRAVENOUS at 09:36

## 2023-09-01 RX ADMIN — 0.12% CHLORHEXIDINE GLUCONATE 15 ML: 1.2 RINSE ORAL at 22:02

## 2023-09-01 RX ADMIN — MAGNESIUM SULFATE HEPTAHYDRATE 2 G: 40 INJECTION, SOLUTION INTRAVENOUS at 18:31

## 2023-09-01 RX ADMIN — SODIUM CHLORIDE 5 UNITS/HR: 9 INJECTION, SOLUTION INTRAVENOUS at 07:14

## 2023-09-01 RX ADMIN — MUPIROCIN 1 APPLICATION: 20 OINTMENT TOPICAL at 20:06

## 2023-09-01 RX ADMIN — Medication 100 MCG: at 07:00

## 2023-09-01 RX ADMIN — NICARDIPINE HYDROCHLORIDE 5 MG/HR: 25 INJECTION, SOLUTION INTRAVENOUS at 07:20

## 2023-09-01 RX ADMIN — ALBUMIN (HUMAN) 250 ML: 12.5 INJECTION, SOLUTION INTRAVENOUS at 11:47

## 2023-09-01 RX ADMIN — PROPOFOL 60 MG: 10 INJECTION, EMULSION INTRAVENOUS at 06:40

## 2023-09-01 RX ADMIN — HEPARIN SODIUM 21000 UNITS: 1000 INJECTION, SOLUTION INTRAVENOUS; SUBCUTANEOUS at 07:59

## 2023-09-01 RX ADMIN — ALBUMIN (HUMAN) 250 ML: 12.5 INJECTION, SOLUTION INTRAVENOUS at 20:35

## 2023-09-01 RX ADMIN — ALBUMIN (HUMAN) 250 ML: 12.5 INJECTION, SOLUTION INTRAVENOUS at 16:09

## 2023-09-01 RX ADMIN — NOREPINEPHRINE BITARTRATE 16 MCG: 1 SOLUTION INTRAVENOUS at 08:18

## 2023-09-01 RX ADMIN — ACETAMINOPHEN 1000 MG: 10 INJECTION, SOLUTION INTRAVENOUS at 11:44

## 2023-09-01 RX ADMIN — Medication 100 MCG: at 06:44

## 2023-09-01 RX ADMIN — Medication 100 MCG: at 07:35

## 2023-09-01 RX ADMIN — Medication 100 MCG: at 07:57

## 2023-09-01 RX ADMIN — ALBUMIN (HUMAN) 250 ML: 12.5 INJECTION, SOLUTION INTRAVENOUS at 13:04

## 2023-09-01 RX ADMIN — CEFAZOLIN SODIUM 2 G: 2 INJECTION, SOLUTION INTRAVENOUS at 07:00

## 2023-09-01 RX ADMIN — CEFAZOLIN SODIUM 2 G: 2 INJECTION, SOLUTION INTRAVENOUS at 22:03

## 2023-09-01 RX ADMIN — FENTANYL CITRATE 100 MCG: 50 INJECTION INTRAMUSCULAR; INTRAVENOUS at 09:27

## 2023-09-01 RX ADMIN — ATORVASTATIN CALCIUM 40 MG: 20 TABLET, FILM COATED ORAL at 21:04

## 2023-09-01 RX ADMIN — 0.12% CHLORHEXIDINE GLUCONATE 15 ML: 1.2 RINSE ORAL at 06:10

## 2023-09-01 RX ADMIN — MORPHINE SULFATE 2 MG: 2 INJECTION, SOLUTION INTRAMUSCULAR; INTRAVENOUS at 18:06

## 2023-09-01 RX ADMIN — METOPROLOL TARTRATE 12.5 MG: 25 TABLET, FILM COATED ORAL at 06:10

## 2023-09-01 RX ADMIN — Medication 100 MCG: at 06:47

## 2023-09-01 RX ADMIN — PHENYLEPHRINE HYDROCHLORIDE 0.5 MCG/KG/MIN: 10 INJECTION, SOLUTION INTRAVENOUS at 06:45

## 2023-09-01 RX ADMIN — PANTOPRAZOLE SODIUM 40 MG: 40 INJECTION, POWDER, FOR SOLUTION INTRAVENOUS at 11:44

## 2023-09-01 RX ADMIN — SODIUM CHLORIDE: 9 INJECTION, SOLUTION INTRAVENOUS at 06:29

## 2023-09-01 RX ADMIN — MUPIROCIN 1 APPLICATION: 20 OINTMENT TOPICAL at 06:09

## 2023-09-01 RX ADMIN — ROCURONIUM BROMIDE 70 MG: 10 INJECTION, SOLUTION INTRAVENOUS at 06:40

## 2023-09-01 RX ADMIN — FENTANYL CITRATE 100 MCG: 50 INJECTION INTRAMUSCULAR; INTRAVENOUS at 07:20

## 2023-09-01 RX ADMIN — FENTANYL CITRATE 150 MCG: 50 INJECTION INTRAMUSCULAR; INTRAVENOUS at 06:40

## 2023-09-01 NOTE — OP NOTE
Operative Note    Date of Dictation: 09/01/23    Date of Procedure: Same    Referring Physician: Surendra Sprague MD    Preoperative diagnosis:   1.  Severe multivessel coronary artery disease  2.  Abnormal nuclear stress test  3.  History of heart attack in the past  4.  Tobacco use  5.  Bilateral high-grade stenosis of both internal carotid arteries    Postoperative diagnosis:   Same    Procedure:   1.  Elective CABG x 4 with a LIMA to the mid LAD and reverse [place] saphenous vein graft to the PDA, low marginal and diagonal  2. EVH of the right leg  3.  Comprehensive neuro monitoring    Surgeon: Damir Aaron MD     Assistants: Assistant: Jacobo Brice PA-C; Haseeb Rodriguez PA-C was responsible for performing the following activities: Retraction, Suction, Irrigation, Suturing, Closing, Placing Dressing, Harvesting of Vessels, Bypass Grafting, and all aspects of the complex cardiac case  and their skilled assistance was necessary for the success of this case.    Anesthesia: General endotracheal anesthesia and SCOTT    Findings:  The saphenous vein was harvested endoscopically form the right  leg. The vein had a diameter of 4 mm and was of good quality. The coronaries had diameters between 1.75 and 2 mm.    Estimated Blood Loss: Approximately 400 cc, most of it recovered with a Cell Saver device and cardiotomy suckers and was retransfuse to the patient    STS Data:    The patient and family were explained the risks (STS risk score calculated), benefits and alternatives of surgery and agreed to proceed. The antibiotics and b blockers were given in the STS required window.  Counseling was given about diet, alcohol and tobacco use as needed.  End-of-life options were discussed        Description of the procedure:     The patient was placed supine on the operative table. General anesthesia was given and lines placed. The patient was prepped and draped using the usual sterile technique. A median sternotomy was performed  with a scalpel and the layers carried down to the sternum using the electrocautery. The sternum was split in the midline using a vertical oscillating saw. Hemostasis was achieved. The LIMA was harvested as a pedicle and prepared with papaverine. It was of good quality. 300 units/kg of IV heparin was given to an ACT over 400. A Favaloro retractor was placed and the mediastinum exposed, the pericardium was opened and the edges tacked to the wound. Cannulation sutures were placed in the ascending aorta and right atrium. Small cannulas were placed and aorto right atrial cardiopulmonary bypass was started. Cardioplegia cannulas were placed and then the ascending aorta was clamped. One liter of cold blood cardioplegia was given in an antegrade fashion to achieved diastolic arrest and further doses every 15 minutes thereafter.  The perfusion pressure was maintained at 80 to 90 mmHg to allow the bilateral cerebral perfusion which was monitored by our our neuro monitoring team.  No significant changes were observed or lateralization.  Constructions of grafts were done in the sequence distal - proximal between the reversed saphenous vein graft and each coronary targets. Grafts were constructed to the mid LAD, PDA, OM3, and diagonal  coronary arteries and plegia was given between graft sequences after de-airing the aortic root and tying the proximal anastomosis. The LIMA was anastomosed to the mid LAD using 7.0 prolene continuous suture with a small needle, then the warm dose of cardioplegia was given and then the aortic clamp removed as well as the LIMA bulldog clamp. All anastomoses were checked and had good flow and morphology. The left pleural space was suctioned and the lungs ventilated. The heart was paced till regular atrial rhythm resumed. I allowed the heart to eject and once hemodynamics were acceptable, then the CPB was discontinued and the venous and cardioplegia cannulas removed. The matching dose of protamine was  given and the aortic cannula removed as well. AV temporary wires and pleural and mediastinal chest tubes were placed and the wound sprayed with platelet rich plasma.  The sternum was closed with single and double wires and soft tissue in layers of reabsorbable material. The wounds were covered with sterile dressings.       Specimen removed: None    CPB time: 57 minutes    Aortic clamp time: 50 minutes    Complications:  none           Disposition: Cardiovascular recovery room           Condition: Critical but stable.

## 2023-09-01 NOTE — ANESTHESIA PROCEDURE NOTES
Airway  Urgency: elective    Date/Time: 9/1/2023 6:43 AM  Airway not difficult    General Information and Staff    Patient location during procedure: OR  Anesthesiologist: Olivia Patel MD    Indications and Patient Condition  Indications for airway management: airway protection    Preoxygenated: yes  MILS maintained throughout  Mask difficulty assessment: 1 - vent by mask    Final Airway Details  Final airway type: endotracheal airway      Successful airway: ETT  Cuffed: yes   Successful intubation technique: direct laryngoscopy  Endotracheal tube insertion site: oral  Blade: Piyush  Blade size: 3  ETT size (mm): 8.0  Cormack-Lehane Classification: grade I - full view of glottis  Placement verified by: chest auscultation and capnometry   Measured from: teeth  Number of attempts at approach: 1  Assessment: lips, teeth, and gum same as pre-op and atraumatic intubation

## 2023-09-01 NOTE — ANESTHESIA PROCEDURE NOTES
Arterial Line      Patient reassessed immediately prior to procedure    Start time: 9/1/2023 6:36 AM  Stop Time:9/1/2023 6:38 AM       Line placed for hemodynamic monitoring and ABGs/Labs/ISTAT.  Performed By   Anesthesiologist: Olivia Patel MD   Preanesthetic Checklist  Completed: patient identified, IV checked, site marked, risks and benefits discussed, surgical consent, monitors and equipment checked, pre-op evaluation and timeout performed  Arterial Line Prep    Sterile Tech: gloves, mask and cap  Prep: ChloraPrep  Patient monitoring: blood pressure monitoring, continuous pulse oximetry and EKG  Arterial Line Procedure   Laterality:left  Location:  radial artery  Catheter size: 20 G   Guidance: landmark technique  Number of attempts: 1  Successful placement: yes   Post Assessment   Dressing Type: secured with tape and occlusive dressing applied.   Complications no  Circ/Move/Sens Assessment: normal and unchanged.   Patient Tolerance: patient tolerated the procedure well with no apparent complications

## 2023-09-01 NOTE — ANESTHESIA PROCEDURE NOTES
PA Catheter      Patient reassessed immediately prior to procedure    Patient location during procedure: OR  Start time: 9/1/2023 8:43 AM  Stop Time:9/1/2023 8:43 AM  Indications: central pressure monitoring, vascular access and MD/Surgeon request  Staff  Anesthesiologist: Olivia Patel MD  Preanesthetic Checklist  Completed: patient identified, IV checked, site marked, risks and benefits discussed, surgical consent, monitors and equipment checked, pre-op evaluation and timeout performed  Central Line Prep  Sterile Tech:cap, gloves, gown, mask and sterile barriers  Prep: chloraprep  Patient monitoring: blood pressure monitoring, EKG and continuous pulse oximetry  Central Line Procedure  Laterality:right  Location:internal jugular  Catheter Type:[place]  Assessment  Complications:no  Patient Tolerance:patient tolerated the procedure well with no apparent complications

## 2023-09-01 NOTE — ANESTHESIA PROCEDURE NOTES
Diagnostic IntraOp Yves    Procedure Performed: Diagnostic IntraOp Yves       Start Time:  9/1/2023 9:05 AM       End Time:   9/1/2023 9:05 AM    Preanesthesia Checklist:  Patient identified, IV assessed, risks and benefits discussed, monitors and equipment assessed, procedure being performed at surgeon's request and anesthesia consent obtained.    General Procedure Information  Diagnostic Indications for Echo:  assessment of ascending aorta, assessment of surgical repair, defect repair evaluation and hemodynamic monitoring  Physician Requesting Echo: Damir Aaron MD  Location performed:  OR  Intubated  Bite block placed  Heart visualized  Probe Insertion:  Easy  Probe Type:  Multiplane  Modalities:  2D only, color flow mapping, continuous wave Doppler and pulse wave Doppler    Echocardiographic and Doppler Measurements    Ventricles    Right Ventricle:  Cavity size normal.  Thrombus not present.  Global function normal.    Left Ventricle:  Cavity size normal.  Diastolic dimension 3 cm.  Thrombus not present.  Ejection Fraction 55%.          Valves    Aortic Valve:  Annulus normal.  Stenosis not present.  Regurgitation trace.  Leaflets normal.      Mitral Valve:  Annulus normal.  Stenosis not present.  Regurgitation trace.      Tricuspid Valve:  Annulus normal.  Stenosis not present.  Regurgitation trace.        Aorta    Ascending Aorta:  Size normal.  Dissection not present.  Plaque thickness less than 3 mm.  Mobile plaque not present.    Aortic Arch:  Size normal.  Dissection not present.  Plaque thickness greater than 3 mm.  Mobile plaque not present.    Descending Aorta:  Size normal.  Dissection not present.  Plaque thickness greater than 3 mm.  Mobile plaque not present.        Atria    Right Atrium:  Size normal.  Thrombus not present.  Tumor not present.  Device present.    Left Atrium:  Size normal.  Spontaneous echo contrast present.  Thrombus not present.  Tumor not present.  Device not present.     Left atrial appendage normal.      Septa    Atrial Septum:  Intra-atrial septal morphology lipomatous hypertrophy.      Ventricular Septum:  Intra-ventricular septum morphology hypertrophy.      Diastolic Function Measurements:  Diastolic Dysfunction Grade= I  E=  ms  A=  ms  E/A Ratio=  1.2  DT=  ms  S/D=   IVRT=    Other Findings  Pericardium:  normal  Pleural Effusion:  none  Pulmonary Arteries:  normal  Pulmonary Venous Flow:  normal    Anesthesia Information  Performed Personally  Anesthesiologist:  Olivia Patel MD      Echocardiogram Comments:       Severe concentric LV hypertrophy, appears low normal function, LVEF 50-55%.  Normal RV function.   Valves normal.     Post CPB:  S/p CABG  Hyperdynamic LV function, LVEF 65%, inferoseptal hypokinesis  Valves unchanged.   No aortic dissection post decannulation.

## 2023-09-01 NOTE — ANESTHESIA POSTPROCEDURE EVALUATION
"Patient: Lennie Gomez    Procedure Summary       Date: 09/01/23 Room / Location: [place] [address] / [facility] CARDIOVASCULAR OPERATING ROOM    Anesthesia Start: 0629 Anesthesia Stop: 1018    Procedure: SCOTT STERNOTOMY CORONARY ARTERY BYPASS GRAFT TIMES 4 USING LEFT INTERNAL MAMMARY ARTERY AND RIGHT GREATER SAPHENOUS VEIN GRAFT PER ENDOSCOPIC VEIN HARVESTING AND PRP (Chest) Diagnosis:       CAD, multiple vessel      Coronary atherosclerosis due to calcified coronary lesion (CODE)      (CAD, multiple vessel [I25.10])      (Coronary atherosclerosis due to calcified coronary lesion (CODE) [I25.84])    Surgeons: Damir Aaron MD Provider: Olivia Patel MD    Anesthesia Type: general, CVL, Tiki ASA Status: 4            Anesthesia Type: general, CVL, Dietrich    Vitals  Vitals Value Taken Time   /61 09/01/23 1601   Temp 36.8 øC (98.24 øF) 09/01/23 1611   Pulse 84 09/01/23 1611   Resp 15 09/01/23 1015   SpO2 98 % 09/01/23 1611   Vitals shown include unvalidated device data.        Post Anesthesia Care and Evaluation    Patient location during evaluation: bedside  Pain management: adequate    Airway patency: patent  Anesthetic complications: No anesthetic complications    Cardiovascular status: acceptable  Respiratory status: acceptable  Hydration status: acceptable    Comments: /61   Pulse 84   Temp 36.8 øC (98.2 øF)   Resp 15   Ht 157.5 cm (62.01\")   Wt 68.1 kg (150 lb 2.1 oz)   SpO2 98%   BMI 27.45 kg/mý     "

## 2023-09-01 NOTE — ANESTHESIA PROCEDURE NOTES
Central Line      Patient reassessed immediately prior to procedure    Patient location during procedure: OR  Start time: 9/1/2023 6:46 AM  Stop Time:9/1/2023 6:51 AM  Indications: central pressure monitoring, vascular access and MD/Surgeon request  Staff  Anesthesiologist: Olivia Patel MD  Preanesthetic Checklist  Completed: patient identified, IV checked, site marked, risks and benefits discussed, surgical consent, monitors and equipment checked, pre-op evaluation and timeout performed  Central Line Prep  Sterile Tech:cap, gloves, gown, mask and sterile barriers  Prep: chloraprep  Patient monitoring: blood pressure monitoring, EKG and continuous pulse oximetry  Central Line Procedure  Laterality:right  Location:internal jugular  Catheter Type:Cordis (MAC Introducer)  Catheter Size:9 Fr  Guidance:ultrasound guided  PROCEDURE NOTE/ULTRASOUND INTERPRETATION.  Using ultrasound guidance the potential vascular sites for insertion of the catheter were visualized to determine the patency of the vessel to be used for vascular access.  After selecting the appropriate site for insertion, the needle was visualized under ultrasound being inserted into the internal jugular vein, followed by ultrasound confirmation of wire and catheter placement. There were no abnormalities seen on ultrasound; an image was taken; and the patient tolerated the procedure with no complications. Images: still images obtained, printed/placed on chart (image in asia exam)  Assessment  Post procedure:biopatch applied, line sutured, occlusive dressing applied and secured with tape  Assessement:blood return through all ports, free fluid flow, chest x-ray ordered and Jc Test  Complications:no  Patient Tolerance:patient tolerated the procedure well with no apparent complications

## 2023-09-01 NOTE — ANESTHESIA PREPROCEDURE EVALUATION
Anesthesia Evaluation     NPO Solid Status: > 8 hours  NPO Liquid Status: > 2 hours           Airway   Mallampati: II  TM distance: >3 FB  Neck ROM: full  Dental - normal exam     Pulmonary    (+) a smoker Current Abstained day of surgery,  Cardiovascular     Rhythm: regular  Rate: normal    (+) hypertension, past MI , CAD, hyperlipidemia      Neuro/Psych  GI/Hepatic/Renal/Endo    (+) diabetes mellitus type 2    Musculoskeletal     Abdominal    Substance History      OB/GYN          Other   arthritis,                   Anesthesia Plan    ASA 4     general, CVL and [place]     (SCOTT)  intravenous induction     Anesthetic plan, risks, benefits, and alternatives have been provided, discussed and informed consent has been obtained with: patient.    Use of blood products discussed with patient .      CODE STATUS:    Code Status (Patient has no pulse and is not breathing): CPR (Attempt to Resuscitate)  Medical Interventions (Patient has pulse or is breathing): Full Support

## 2023-09-02 ENCOUNTER — APPOINTMENT (OUTPATIENT)
Dept: GENERAL RADIOLOGY | Facility: HOSPITAL | Age: 68
DRG: 236 | End: 2023-09-02
Payer: MEDICARE

## 2023-09-02 PROBLEM — E78.00 ELEVATED CHOLESTEROL: Status: ACTIVE | Noted: 2023-09-02

## 2023-09-02 PROBLEM — E11.65 TYPE 2 DIABETES MELLITUS WITH HYPERGLYCEMIA: Status: ACTIVE | Noted: 2023-09-02

## 2023-09-02 PROBLEM — E11.42 DIABETIC POLYNEUROPATHY ASSOCIATED WITH TYPE 2 DIABETES MELLITUS: Status: ACTIVE | Noted: 2023-09-02

## 2023-09-02 PROBLEM — I10 HYPERTENSION, ESSENTIAL: Status: ACTIVE | Noted: 2023-09-02

## 2023-09-02 LAB
ALBUMIN SERPL-MCNC: 4.6 G/DL (ref 3.5–5.2)
ANION GAP SERPL CALCULATED.3IONS-SCNC: 12 MMOL/L (ref 5–15)
BASOPHILS # BLD AUTO: 0.03 10*3/MM3 (ref 0–0.2)
BASOPHILS NFR BLD AUTO: 0.4 % (ref 0–1.5)
BUN SERPL-MCNC: 9 MG/DL (ref 8–23)
BUN/CREAT SERPL: 15.8 (ref 7–25)
CA-I BLD-MCNC: 4.6 MG/DL (ref 4.6–5.4)
CA-I SERPL ISE-MCNC: 1.14 MMOL/L (ref 1.15–1.35)
CALCIUM SPEC-SCNC: 8.6 MG/DL (ref 8.6–10.5)
CHLORIDE SERPL-SCNC: 108 MMOL/L (ref 98–107)
CO2 SERPL-SCNC: 21 MMOL/L (ref 22–29)
CREAT SERPL-MCNC: 0.57 MG/DL (ref 0.57–1)
DEPRECATED RDW RBC AUTO: 43 FL (ref 37–54)
EGFRCR SERPLBLD CKD-EPI 2021: 99.7 ML/MIN/1.73
EOSINOPHIL # BLD AUTO: 0 10*3/MM3 (ref 0–0.4)
EOSINOPHIL NFR BLD AUTO: 0 % (ref 0.3–6.2)
ERYTHROCYTE [DISTWIDTH] IN BLOOD BY AUTOMATED COUNT: 12.8 % (ref 12.3–15.4)
GLUCOSE BLDC GLUCOMTR-MCNC: 153 MG/DL (ref 70–130)
GLUCOSE BLDC GLUCOMTR-MCNC: 165 MG/DL (ref 70–130)
GLUCOSE BLDC GLUCOMTR-MCNC: 170 MG/DL (ref 70–130)
GLUCOSE BLDC GLUCOMTR-MCNC: 172 MG/DL (ref 70–130)
GLUCOSE BLDC GLUCOMTR-MCNC: 179 MG/DL (ref 70–130)
GLUCOSE BLDC GLUCOMTR-MCNC: 193 MG/DL (ref 70–130)
GLUCOSE BLDC GLUCOMTR-MCNC: 196 MG/DL (ref 70–130)
GLUCOSE BLDC GLUCOMTR-MCNC: 200 MG/DL (ref 70–130)
GLUCOSE BLDC GLUCOMTR-MCNC: 203 MG/DL (ref 70–130)
GLUCOSE BLDC GLUCOMTR-MCNC: 213 MG/DL (ref 70–130)
GLUCOSE SERPL-MCNC: 161 MG/DL (ref 65–99)
HCT VFR BLD AUTO: 30.4 % (ref 34–46.6)
HGB BLD-MCNC: 9.9 G/DL (ref 12–15.9)
INR PPP: 1.21 (ref 0.9–1.1)
LYMPHOCYTES # BLD AUTO: 1.55 10*3/MM3 (ref 0.7–3.1)
LYMPHOCYTES NFR BLD AUTO: 21 % (ref 19.6–45.3)
MAGNESIUM SERPL-MCNC: 2.6 MG/DL (ref 1.6–2.4)
MCH RBC QN AUTO: 30.4 PG (ref 26.6–33)
MCHC RBC AUTO-ENTMCNC: 32.6 G/DL (ref 31.5–35.7)
MCV RBC AUTO: 93.3 FL (ref 79–97)
MONOCYTES # BLD AUTO: 0.6 10*3/MM3 (ref 0.1–0.9)
MONOCYTES NFR BLD AUTO: 8.1 % (ref 5–12)
NEUTROPHILS NFR BLD AUTO: 5.18 10*3/MM3 (ref 1.7–7)
NEUTROPHILS NFR BLD AUTO: 70.1 % (ref 42.7–76)
PHOSPHATE SERPL-MCNC: 3.5 MG/DL (ref 2.5–4.5)
PLATELET # BLD AUTO: 104 10*3/MM3 (ref 140–450)
PMV BLD AUTO: 11.6 FL (ref 6–12)
POTASSIUM SERPL-SCNC: 4.2 MMOL/L (ref 3.5–5.2)
PROTHROMBIN TIME: 15.5 SECONDS (ref 11.7–14.2)
QT INTERVAL: 398 MS
QTC INTERVAL: 457 MS
RBC # BLD AUTO: 3.26 10*6/MM3 (ref 3.77–5.28)
SODIUM SERPL-SCNC: 141 MMOL/L (ref 136–145)
WBC NRBC COR # BLD: 7.39 10*3/MM3 (ref 3.4–10.8)

## 2023-09-02 PROCEDURE — 93010 ELECTROCARDIOGRAM REPORT: CPT | Performed by: INTERNAL MEDICINE

## 2023-09-02 PROCEDURE — 82330 ASSAY OF CALCIUM: CPT | Performed by: NURSE PRACTITIONER

## 2023-09-02 PROCEDURE — 25010000002 ACETAMINOPHEN 10 MG/ML SOLUTION: Performed by: NURSE PRACTITIONER

## 2023-09-02 PROCEDURE — 63710000001 INSULIN LISPRO (HUMAN) PER 5 UNITS: Performed by: INTERNAL MEDICINE

## 2023-09-02 PROCEDURE — 97116 GAIT TRAINING THERAPY: CPT

## 2023-09-02 PROCEDURE — 97162 PT EVAL MOD COMPLEX 30 MIN: CPT

## 2023-09-02 PROCEDURE — 25010000002 MAGNESIUM SULFATE 2 GM/50ML SOLUTION: Performed by: NURSE PRACTITIONER

## 2023-09-02 PROCEDURE — 25010000002 ENOXAPARIN PER 10 MG: Performed by: NURSE PRACTITIONER

## 2023-09-02 PROCEDURE — 63710000001 INSULIN GLARGINE PER 5 UNITS: Performed by: INTERNAL MEDICINE

## 2023-09-02 PROCEDURE — 83735 ASSAY OF MAGNESIUM: CPT | Performed by: NURSE PRACTITIONER

## 2023-09-02 PROCEDURE — 25010000002 METOCLOPRAMIDE PER 10 MG: Performed by: NURSE PRACTITIONER

## 2023-09-02 PROCEDURE — 25010000002 CEFAZOLIN IN DEXTROSE 2-4 GM/100ML-% SOLUTION: Performed by: NURSE PRACTITIONER

## 2023-09-02 PROCEDURE — 80069 RENAL FUNCTION PANEL: CPT | Performed by: NURSE PRACTITIONER

## 2023-09-02 PROCEDURE — 85610 PROTHROMBIN TIME: CPT | Performed by: NURSE PRACTITIONER

## 2023-09-02 PROCEDURE — 82948 REAGENT STRIP/BLOOD GLUCOSE: CPT

## 2023-09-02 PROCEDURE — 71045 X-RAY EXAM CHEST 1 VIEW: CPT

## 2023-09-02 PROCEDURE — 25010000002 FUROSEMIDE PER 20 MG: Performed by: THORACIC SURGERY (CARDIOTHORACIC VASCULAR SURGERY)

## 2023-09-02 PROCEDURE — 25010000002 ONDANSETRON PER 1 MG: Performed by: NURSE PRACTITIONER

## 2023-09-02 PROCEDURE — 99024 POSTOP FOLLOW-UP VISIT: CPT | Performed by: THORACIC SURGERY (CARDIOTHORACIC VASCULAR SURGERY)

## 2023-09-02 PROCEDURE — 93005 ELECTROCARDIOGRAM TRACING: CPT | Performed by: NURSE PRACTITIONER

## 2023-09-02 PROCEDURE — 85025 COMPLETE CBC W/AUTO DIFF WBC: CPT | Performed by: NURSE PRACTITIONER

## 2023-09-02 RX ORDER — NICOTINE POLACRILEX 4 MG
15 LOZENGE BUCCAL
Status: DISCONTINUED | OUTPATIENT
Start: 2023-09-02 | End: 2023-09-05 | Stop reason: SDUPTHER

## 2023-09-02 RX ORDER — DEXTROSE MONOHYDRATE 25 G/50ML
25 INJECTION, SOLUTION INTRAVENOUS
Status: DISCONTINUED | OUTPATIENT
Start: 2023-09-02 | End: 2023-09-05 | Stop reason: SDUPTHER

## 2023-09-02 RX ORDER — FUROSEMIDE 10 MG/ML
40 INJECTION INTRAMUSCULAR; INTRAVENOUS ONCE
Status: COMPLETED | OUTPATIENT
Start: 2023-09-02 | End: 2023-09-02

## 2023-09-02 RX ORDER — INSULIN LISPRO 100 [IU]/ML
2-9 INJECTION, SOLUTION INTRAVENOUS; SUBCUTANEOUS
Status: DISCONTINUED | OUTPATIENT
Start: 2023-09-02 | End: 2023-09-05

## 2023-09-02 RX ORDER — IBUPROFEN 600 MG/1
1 TABLET ORAL
Status: DISCONTINUED | OUTPATIENT
Start: 2023-09-02 | End: 2023-09-08 | Stop reason: HOSPADM

## 2023-09-02 RX ORDER — POTASSIUM CHLORIDE 750 MG/1
10 TABLET, FILM COATED, EXTENDED RELEASE ORAL ONCE
Status: COMPLETED | OUTPATIENT
Start: 2023-09-02 | End: 2023-09-02

## 2023-09-02 RX ORDER — MELATONIN
1000 DAILY
Status: DISCONTINUED | OUTPATIENT
Start: 2023-09-02 | End: 2023-09-08 | Stop reason: HOSPADM

## 2023-09-02 RX ADMIN — CYCLOBENZAPRINE 10 MG: 10 TABLET, FILM COATED ORAL at 05:30

## 2023-09-02 RX ADMIN — ATORVASTATIN CALCIUM 40 MG: 20 TABLET, FILM COATED ORAL at 20:34

## 2023-09-02 RX ADMIN — INSULIN GLARGINE 5 UNITS: 100 INJECTION, SOLUTION SUBCUTANEOUS at 12:14

## 2023-09-02 RX ADMIN — CEFAZOLIN SODIUM 2 G: 2 INJECTION, SOLUTION INTRAVENOUS at 22:46

## 2023-09-02 RX ADMIN — ACETAMINOPHEN 650 MG: 325 TABLET, FILM COATED ORAL at 18:24

## 2023-09-02 RX ADMIN — 0.12% CHLORHEXIDINE GLUCONATE 15 ML: 1.2 RINSE ORAL at 22:56

## 2023-09-02 RX ADMIN — MAGNESIUM SULFATE HEPTAHYDRATE 2 G: 40 INJECTION, SOLUTION INTRAVENOUS at 02:20

## 2023-09-02 RX ADMIN — SENNOSIDES AND DOCUSATE SODIUM 2 TABLET: 50; 8.6 TABLET ORAL at 20:34

## 2023-09-02 RX ADMIN — ONDANSETRON 4 MG: 2 INJECTION INTRAMUSCULAR; INTRAVENOUS at 06:37

## 2023-09-02 RX ADMIN — POTASSIUM CHLORIDE 10 MEQ: 750 TABLET, EXTENDED RELEASE ORAL at 10:09

## 2023-09-02 RX ADMIN — FUROSEMIDE 40 MG: 10 INJECTION, SOLUTION INTRAMUSCULAR; INTRAVENOUS at 10:09

## 2023-09-02 RX ADMIN — MUPIROCIN 1 APPLICATION: 20 OINTMENT TOPICAL at 08:21

## 2023-09-02 RX ADMIN — MUPIROCIN 1 APPLICATION: 20 OINTMENT TOPICAL at 20:35

## 2023-09-02 RX ADMIN — METOPROLOL TARTRATE 12.5 MG: 25 TABLET, FILM COATED ORAL at 20:34

## 2023-09-02 RX ADMIN — Medication 1000 UNITS: at 12:14

## 2023-09-02 RX ADMIN — CEFAZOLIN SODIUM 2 G: 2 INJECTION, SOLUTION INTRAVENOUS at 15:45

## 2023-09-02 RX ADMIN — ACETAMINOPHEN 1000 MG: 10 INJECTION, SOLUTION INTRAVENOUS at 02:20

## 2023-09-02 RX ADMIN — ASPIRIN 81 MG: 81 TABLET, COATED ORAL at 08:20

## 2023-09-02 RX ADMIN — 0.12% CHLORHEXIDINE GLUCONATE 15 ML: 1.2 RINSE ORAL at 10:09

## 2023-09-02 RX ADMIN — CEFAZOLIN SODIUM 2 G: 2 INJECTION, SOLUTION INTRAVENOUS at 06:06

## 2023-09-02 RX ADMIN — METOCLOPRAMIDE 10 MG: 5 INJECTION, SOLUTION INTRAMUSCULAR; INTRAVENOUS at 04:49

## 2023-09-02 RX ADMIN — PANTOPRAZOLE SODIUM 40 MG: 40 TABLET, DELAYED RELEASE ORAL at 06:06

## 2023-09-02 RX ADMIN — HYDROCODONE BITARTRATE AND ACETAMINOPHEN 2 TABLET: 5; 325 TABLET ORAL at 20:34

## 2023-09-02 RX ADMIN — INSULIN LISPRO 4 UNITS: 100 INJECTION, SOLUTION INTRAVENOUS; SUBCUTANEOUS at 18:23

## 2023-09-02 RX ADMIN — OXYCODONE HYDROCHLORIDE 10 MG: 5 TABLET ORAL at 06:37

## 2023-09-02 RX ADMIN — METOPROLOL TARTRATE 12.5 MG: 25 TABLET, FILM COATED ORAL at 08:21

## 2023-09-02 RX ADMIN — INSULIN LISPRO 2 UNITS: 100 INJECTION, SOLUTION INTRAVENOUS; SUBCUTANEOUS at 12:14

## 2023-09-02 RX ADMIN — ENOXAPARIN SODIUM 40 MG: 100 INJECTION SUBCUTANEOUS at 18:24

## 2023-09-02 RX ADMIN — INSULIN LISPRO 4 UNITS: 100 INJECTION, SOLUTION INTRAVENOUS; SUBCUTANEOUS at 20:37

## 2023-09-02 NOTE — PLAN OF CARE
Goal Outcome Evaluation:  Plan of Care Reviewed With: patient           Outcome Evaluation: Pt is a 69 yo F POD 1 CABG x4. Pt lives with her grandchildren and reports independence at BL with no AD. Pt has 4 SOFIA with no steps inside and denies recent falls hx. Pt presents to PT with impaired strength, endurance, and pain limiting overall mobility. Pt stood with min-mod A x2 and completed standing MIP. Able to walk ~5ft away from bed and back, c/o dizziness and significant pain limiting further gait distance. Pt assisted back to sitting and denied further ambulation attempts. Pt stood 1 more time with min A x2 to reposition in the chair and was left with needs met. PT will continue to follow to progress mobility as tolerated. Anticipate DC home with HHPT and family assist pending progress.      Anticipated Discharge Disposition (PT): home with assist, home with home health, home with 24/7 care

## 2023-09-02 NOTE — CONSULTS
[facility] Medicine Services  CONSULT NOTE      Patient Name: Lennie Gomez  : 1955  MRN: [id number]    Primary Care Physician: Jacqui Patterson APRN  Provider requesting consultation: Damir Aaron MD    Subjective   Subjective     Reason for Consultation:  Consult for diabetes management    HPI:  Lennie Gomez is a 68 y.o. female with past medical history as listed presents to the hospital with abnormal stress test and cardiomyopathy with reversible perfusion defect and underwent heart catheterization which showed multivessel disease.  She presented here for multivessel CABG x4 with LIMA to LAD.  Postoperatively patient reports she is having mild intermittent surgical region pain that is worse with certain movements and improved with pain medications.      Review of Systems   Constitutional: Negative.    HENT: Negative.     Eyes: Negative.    Respiratory: Negative.     Cardiovascular:  Negative for palpitations and leg swelling.   Gastrointestinal: Negative.    Endocrine: Negative.    Genitourinary: Negative.    Musculoskeletal: Negative.    Skin: Negative.    Allergic/Immunologic: Negative.    Neurological: Negative.    Hematological: Negative.    Psychiatric/Behavioral: Negative.       All other systems reviewed and are negative.     Personal History     Past Medical History:   Diagnosis Date    Arthritis     Coronary artery disease     Diabetes mellitus     Elevated cholesterol     Elevated cholesterol 2023    Hypertension     Myocardial infarction        Past Surgical History:   Procedure Laterality Date    CARDIAC CATHETERIZATION      CARDIAC CATHETERIZATION N/A 2023    Procedure: Left Heart Cath with possible coronary angioplasty;  Surgeon: Surendra Sprague MD;  Location: [facility] CATH INVASIVE LOCATION;  Service: Cardiology;  Laterality: N/A;     SECTION         Family History: family history includes Heart attack in her mother. Other  pertinent FHx was reviewed and unremarkable.     Social History:  reports that she has been smoking cigarettes. She has a 25.00 pack-year smoking history. She has never used smokeless tobacco. She reports that she does not drink alcohol and does not use drugs.    Medications:  Insulin Glargine, clopidogrel, ergocalciferol, insulin detemir, losartan-hydrochlorothiazide, metFORMIN, metoprolol succinate XL, rosuvastatin, and venlafaxine    Scheduled Meds:aspirin, 81 mg, Oral, Daily  atorvastatin, 40 mg, Oral, Nightly  ceFAZolin, 2 g, Intravenous, Q8H  chlorhexidine, 15 mL, Mouth/Throat, Q12H  cholecalciferol, 1,000 Units, Oral, Daily  enoxaparin, 40 mg, Subcutaneous, Daily  insulin glargine, 5 Units, Subcutaneous, Daily  insulin lispro, 2-9 Units, Subcutaneous, 4x Daily AC & at Bedtime  metoprolol tartrate, 12.5 mg, Oral, Q12H  mupirocin, , Each Nare, BID  pantoprazole, 40 mg, Oral, QAM  senna-docusate sodium, 2 tablet, Oral, Nightly      Continuous Infusions:clevidipine, 2-32 mg/hr  dexmedetomidine, 0.2-1.5 mcg/kg/hr, Last Rate: Stopped (09/01/23 1330)  DOPamine, 2-20 mcg/kg/min  EPINEPHrine, 0.02-0.1 mcg/kg/min  milrinone, 0.25-0.375 mcg/kg/min  niCARdipine, 5-15 mg/hr, Last Rate: Stopped (09/01/23 1156)  nitroglycerin, 5-200 mcg/min  norepinephrine, 0.02-0.2 mcg/kg/min, Last Rate: Stopped (09/01/23 1640)  phenylephrine, 0.2-2 mcg/kg/min  propofol, 5-50 mcg/kg/min, Last Rate: Stopped (09/01/23 1205)  sodium chloride, 30 mL/hr, Last Rate: 30 mL/hr (09/01/23 1120)      PRN Meds:.  acetaminophen **OR** acetaminophen **OR** acetaminophen    ALPRAZolam    aluminum-magnesium hydroxide-simethicone    bisacodyl    bisacodyl    clevidipine    cyclobenzaprine    dextrose    dextrose    DOPamine    EPINEPHrine    glucagon (human recombinant)    HYDROcodone-acetaminophen    midazolam    milrinone    Morphine **AND** naloxone    Morphine    niCARdipine    nitroglycerin    norepinephrine    ondansetron    oxyCODONE     phenylephrine    polyethylene glycol    Potassium Replacement - Follow Nurse / BPA Driven Protocol    propofol    Allergies   Allergen Reactions    Theophylline Hives       Objective   Objective     Vital Signs:   Temp:  [97.3 °F (36.3 °C)-99.5 °F (37.5 °C)] 99.5 °F (37.5 °C)  Heart Rate:  [73-94] 89  Resp:  [11-21] 21  BP: (102-145)/(48-72) 131/67  Flow (L/min):  [4] 4  FiO2 (%):  [40 %-41 %] 40 %    Physical Exam  Constitutional: Awake, alert  Eyes: PERRLA, sclerae anicteric, no conjunctival injection  HENT: NCAT, mucous membranes moist  Neck: Supple, no thyromegaly, no lymphadenopathy, trachea midline  Respiratory: No cough or wheezing, nonlabored respirations currently on supplemental oxygen  Cardiovascular: Normal pulse rate palpable radial pulses bilaterally  Gastrointestinal: soft, nontender, nondistended  Musculoskeletal: Postoperative sternotomy changes, BMI is 27, minimal bilateral ankle edema, no clubbing or cyanosis to extremities  Psychiatric: Appropriate affect, cooperative  Neurologic: Oriented, strength symmetric in all extremities, Cranial Nerves grossly intact to confrontation, speech clear  Skin: No rashes or jaundice      LAB RESULTS:      Lab 09/02/23  0257 09/01/23  1347 09/01/23  1026 09/01/23  0933 09/01/23  0854 09/01/23  0712 08/31/23  1132   WBC 7.39 6.85 5.98  --   --   --  6.91   HEMOGLOBIN 9.9* 10.3* 9.9*  --   --   --  13.6   HEMOGLOBIN, POC  --   --   --  8.8* 9.2*   < >  --    HEMATOCRIT 30.4* 31.5* 29.9*  --   --   --  41.5   HEMATOCRIT POC  --   --   --  26* 27*   < >  --    PLATELETS 104* 102* 109*  --   --   --  160   NEUTROS ABS 5.18  --  4.22  --   --   --  3.51   IMMATURE GRANS (ABS)  --   --  0.03  --   --   --  0.01   LYMPHS ABS 1.55  --  1.34  --   --   --  2.69   MONOS ABS 0.60  --  0.32  --   --   --  0.55   EOS ABS 0.00  --  0.04  --   --   --  0.09   MCV 93.3 93.8 93.7  --   --   --  94.1   PROTIME 15.5*  --  16.6*  --   --   --  13.6   APTT  --   --  30.9  --   --   --   31.0    < > = values in this interval not displayed.         Lab 09/02/23 0257 09/01/23 2003 09/01/23 1347 09/01/23 1026 09/01/23 1026 09/01/23 0319 08/31/23  1132   SODIUM 141  --  142  --  143 139 138   POTASSIUM 4.2 3.9 4.2  --  3.3* 3.8 3.8   CHLORIDE 108*  --  111*  --  106 102 101   CO2 21.0*  --  24.0  --  22.8 26.9 24.0   ANION GAP 12.0  --  7.0  --  14.2 10.1 13.0   BUN 9  --  9  --  9 8 9   CREATININE 0.57  --  0.71  --  0.80 0.62 0.65   EGFR 99.7  --  93.3  --  80.9 97.7 96.6   GLUCOSE 161*  --  102*  --  205* 252* 263*   CALCIUM 8.6  --  8.9  --  9.2 9.1 9.4   IONIZED CALCIUM 1.14*  4.6  --   --    < > 1.26  5.0  --   --    MAGNESIUM 2.6*  --  2.9*  --  2.8*  --  1.6   PHOSPHORUS 3.5  --  1.2*  --  1.2*  --   --    HEMOGLOBIN A1C  --   --   --   --   --   --  11.40*    < > = values in this interval not displayed.         Lab 09/02/23 0257 09/01/23 1347 09/01/23 1026 08/31/23  1132   TOTAL PROTEIN  --   --   --  7.2   ALBUMIN 4.6 4.7 4.0 4.1   GLOBULIN  --   --   --  3.1   ALT (SGPT)  --   --   --  12   AST (SGOT)  --   --   --  13   BILIRUBIN  --   --   --  0.4   ALK PHOS  --   --   --  111         Lab 09/02/23 0257 09/01/23 1026 08/31/23  1132   PROBNP  --   --  383.0   PROTIME 15.5* 16.6* 13.6   INR 1.21* 1.32* 1.03         Lab 08/31/23  1132   CHOLESTEROL 138   LDL CHOL 83   HDL CHOL 32*   TRIGLYCERIDES 129         Lab 08/31/23  1132   ABO TYPING O   RH TYPING Positive   ANTIBODY SCREEN Negative         Lab 09/01/23 2010 09/01/23  1615 09/01/23  1441   PH, ARTERIAL 7.315* 7.276* 7.279*   PCO2, ARTERIAL 48.8* 54.5* 56.1*   PO2 ART 80.6 123.3* 104.3*   O2 SATURATION ART 94.6 98.2 97.0   FIO2 40 40 40   HCO3 ART 24.9 25.3 26.3   BASE EXCESS ART -1.6* -2.0* -1.1*     Brief Urine Lab Results  (Last result in the past 365 days)        Color   Clarity   Blood   Leuk Est   Nitrite   Protein   CREAT   Urine HCG        09/01/23 0323 Yellow   Clear   Negative   Negative   Negative   Negative                  Microbiology Results (last 10 days)       Procedure Component Value - Date/Time    COVID PRE-OP / PRE-PROCEDURE SCREENING ORDER (NO ISOLATION) - Swab, Nasopharynx [[id number]]  (Normal) Collected: 08/31/23 1337    Lab Status: Final result Specimen: Swab from Nasopharynx Updated: 08/31/23 1523    Narrative:      The following orders were created for panel order COVID PRE-OP / PRE-PROCEDURE SCREENING ORDER (NO ISOLATION) - Swab, Nasopharynx.  Procedure                               Abnormality         Status                     ---------                               -----------         ------                     COVID-19,[facility] IN-HOUSE...[[id number]]  Normal              Final result                 Please view results for these tests on the individual orders.    COVID-19,[facility] IN-HOUSE CEPHEID/MEGAN NP SWAB IN TRANSPORT MEDIA 8-12 HR TAT - Swab, Nasopharynx [[id number]]  (Normal) Collected: 08/31/23 1337    Lab Status: Final result Specimen: Swab from Nasopharynx Updated: 08/31/23 1523     COVID19 Not Detected    Narrative:      Fact sheet for providers: https://www.fda.gov/media/905338/download    Fact sheet for patients: https://www.fda.gov/media/848377/download    Test performed by PCR.            Spirometry - Pre & Post Bronchodilator    Result Date: 9/1/2023  Flow-volume loops reviewed and demonstrate less than optimal patient effort no definite pathologic pattern noted.  The suboptimal effort makes interpretation limited.  The FEV1 and FVC were both moderately reduced and the FEV1 to FVC ratio is increased which could suggest restriction and there was no significant response noted to single-dose bronchodilator challenge.  If there is further concern I would recommend repeat testing when patient can give a better effort and including lung volumes and diffusion capacity.     XR Chest 1 View    Result Date: 9/2/2023  PORTABLE CHEST X-RAY  HISTORY: Postop heart surgery.  TECHNIQUE: Portable chest x-ray is  provided and correlated with recent prior x-rays.  FINDINGS: There are sternal wires and mediastinal and left chest drains and a pulmonary arterial catheter with its tip in the main pulmonary artery. New atelectasis at the left lung base. Vascular volume is normal. No pneumothorax.      Impression: Postoperative change in the chest with more substantial left retrocardiac atelectasis than on the x-ray from yesterday morning. No pneumothorax or significant volume overload.  This report was finalized on 9/2/2023 6:18 AM by Dr. Raymond Barger M.D.      XR Chest 1 View    Result Date: 9/1/2023  XR CHEST 1 VW-  HISTORY: Female who is 68 years-old, postoperative evaluation  TECHNIQUE: Frontal view of the chest  COMPARISON: 8/31/2023  FINDINGS: Endotracheal tube tip is 2.3 cm above the maricruz. Right IJ [place]-Tamara catheter extends to the right main pulmonary artery. Left chest tubes, sternotomy wires are noted. Heart size is normal. Pulmonary vasculature is unremarkable. Minimal likely atelectasis in the left lung. No pleural effusion or definite pneumothorax is noted. No acute osseous process.       Impression: Postsurgical changes.  This report was finalized on 9/1/2023 11:26 AM by Dr. Jed Lawrence M.D.      XR Chest PA & Lateral    Result Date: 8/31/2023  Patient: TACO EGAN  Time Out: 23:48 Exam(s): XR CXR 2 VIEWS EXAM:   XR Chest, 2 Views CLINICAL HISTORY:    Reason for exam: preop open heart. TECHNIQUE:   Frontal and lateral views of the chest. COMPARISON:   No relevant prior studies available. FINDINGS:   Lungs:  No consolidation.  Mild bilateral atelectasis or scar.   Pleural space:  No significant abnormality.  No pneumothorax.   Heart:  No significant abnormality.  No cardiomegaly.   Mediastinum:  No significant abnormality.   Bones joints:  No acute abnormality. IMPRESSION:       No acute cardiopulmonary process.     Impression: Electronically signed by Heidi Eden MD on 08-31-23 at 2345    Duplex  Vein Mapping Lower Extremity - Bilateral CAR    Result Date: 8/31/2023    The right great saphenous vein is patent  and of adequate size in the thigh.   The right great saphenous vein is patent and of adequate size in the calf.   The left great saphenous vein is patent and of adequate size in the thigh.   The left great saphenous vein is patent  and of adequate size in the calf.     Arterial Line    Result Date: 9/1/2023  Olivia Patel MD     9/1/2023  8:42 AM Arterial Line Patient reassessed immediately prior to procedure Start time: 9/1/2023 6:36 AM Stop Time:9/1/2023 6:38 AM  Line placed for hemodynamic monitoring and ABGs/Labs/ISTAT. Performed By Anesthesiologist: Olivia Patel MD Preanesthetic Checklist Completed: patient identified, IV checked, site marked, risks and benefits discussed, surgical consent, monitors and equipment checked, pre-op evaluation and timeout performed Arterial Line Prep  Sterile Tech: gloves, mask and cap Prep: ChloraPrep Patient monitoring: blood pressure monitoring, continuous pulse oximetry and EKG Arterial Line Procedure Laterality:left Location:  radial artery Catheter size: 20 G Guidance: landmark technique Number of attempts: 1 Successful placement: yes Post Assessment Dressing Type: secured with tape and occlusive dressing applied. Complications no Circ/Move/Sens Assessment: normal and unchanged. Patient Tolerance: patient tolerated the procedure well with no apparent complications     PA Catheter    Result Date: 9/1/2023  Olivia Patel MD     9/1/2023  8:43 AM PA Catheter Patient reassessed immediately prior to procedure Patient location during procedure: OR Start time: 9/1/2023 8:43 AM Stop Time:9/1/2023 8:43 AM Indications: central pressure monitoring, vascular access and MD/Surgeon request Staff Anesthesiologist: Olivia Patel MD Preanesthetic Checklist Completed: patient identified, IV checked, site marked, risks and benefits discussed, surgical consent,  monitors and equipment checked, pre-op evaluation and timeout performed Central Line Prep Sterile Tech:cap, gloves, gown, mask and sterile barriers Prep: chloraprep Patient monitoring: blood pressure monitoring, EKG and continuous pulse oximetry Central Line Procedure Laterality:right Location:internal jugular Catheter Type:[place]-Tamara Assessment Complications:no Patient Tolerance:patient tolerated the procedure well with no apparent complications     Central Line    Result Date: 9/1/2023  Olivia Patel MD     9/1/2023  8:42 AM Central Line Patient reassessed immediately prior to procedure Patient location during procedure: OR Start time: 9/1/2023 6:46 AM Stop Time:9/1/2023 6:51 AM Indications: central pressure monitoring, vascular access and MD/Surgeon request Staff Anesthesiologist: Olivia Patel MD Preanesthetic Checklist Completed: patient identified, IV checked, site marked, risks and benefits discussed, surgical consent, monitors and equipment checked, pre-op evaluation and timeout performed Central Line Prep Sterile Tech:cap, gloves, gown, mask and sterile barriers Prep: chloraprep Patient monitoring: blood pressure monitoring, EKG and continuous pulse oximetry Central Line Procedure Laterality:right Location:internal jugular Catheter Type:Cordis (MAC Introducer) Catheter Size:9 Fr Guidance:ultrasound guided PROCEDURE NOTE/ULTRASOUND INTERPRETATION.  Using ultrasound guidance the potential vascular sites for insertion of the catheter were visualized to determine the patency of the vessel to be used for vascular access.  After selecting the appropriate site for insertion, the needle was visualized under ultrasound being inserted into the internal jugular vein, followed by ultrasound confirmation of wire and catheter placement. There were no abnormalities seen on ultrasound; an image was taken; and the patient tolerated the procedure with no complications. Images: still images obtained, printed/placed  on chart (image in yves exam) Assessment Post procedure:biopatch applied, line sutured, occlusive dressing applied and secured with tape Assessement:blood return through all ports, free fluid flow, chest x-ray ordered and Jc Test Complications:no Patient Tolerance:patient tolerated the procedure well with no apparent complications     Diagnostic IntraOp Yves    Result Date: 9/1/2023  Olivia Ptael MD     9/1/2023 10:00 AM Diagnostic IntraOp Yves Procedure Performed: Diagnostic IntraOp Yves      Start Time:  9/1/2023 9:05 AM      End Time:   9/1/2023 9:05 AM Preanesthesia Checklist: Patient identified, IV assessed, risks and benefits discussed, monitors and equipment assessed, procedure being performed at surgeon's request and anesthesia consent obtained. General Procedure Information Diagnostic Indications for Echo:  assessment of ascending aorta, assessment of surgical repair, defect repair evaluation and hemodynamic monitoring Physician Requesting Echo: Damir Aaron MD Location performed:  OR Intubated Bite block placed Heart visualized Probe Insertion:  Easy Probe Type:  Multiplane Modalities:  2D only, color flow mapping, continuous wave Doppler and pulse wave Doppler Echocardiographic and Doppler Measurements Ventricles Right Ventricle: Cavity size normal.  Thrombus not present.  Global function normal.  Left Ventricle: Cavity size normal.  Diastolic dimension 3 cm.  Thrombus not present.  Ejection Fraction 55%.  Valves Aortic Valve: Annulus normal.  Stenosis not present.  Regurgitation trace.  Leaflets normal.  Mitral Valve: Annulus normal.  Stenosis not present.  Regurgitation trace.  Tricuspid Valve: Annulus normal.  Stenosis not present.  Regurgitation trace.  Aorta Ascending Aorta: Size normal.  Dissection not present.  Plaque thickness less than 3 mm.  Mobile plaque not present.  Aortic Arch: Size normal.  Dissection not present.  Plaque thickness greater than 3 mm.  Mobile plaque not present.   Descending Aorta: Size normal.  Dissection not present.  Plaque thickness greater than 3 mm.  Mobile plaque not present.  Atria Right Atrium: Size normal.  Thrombus not present.  Tumor not present.  Device present. Left Atrium: Size normal.  Spontaneous echo contrast present.  Thrombus not present.  Tumor not present.  Device not present.  Left atrial appendage normal. Septa Atrial Septum: Intra-atrial septal morphology lipomatous hypertrophy.  Ventricular Septum: Intra-ventricular septum morphology hypertrophy.  Diastolic Function Measurements: Diastolic Dysfunction Grade= I E=  ms A=  ms E/A Ratio=  1.2 DT=  ms S/D= IVRT= Other Findings Pericardium:  normal Pleural Effusion:  none Pulmonary Arteries:  normal Pulmonary Venous Flow:  normal Anesthesia Information Performed Personally Anesthesiologist:  Olivia Patel MD Echocardiogram Comments:      Severe concentric LV hypertrophy, appears low normal function, LVEF 50-55%. Normal RV function. Valves normal. Post CPB: S/p CABG Hyperdynamic LV function, LVEF 65%, inferoseptal hypokinesis Valves unchanged. No aortic dissection post decannulation.     Results for orders placed in visit on 09/01/23    Diagnostic IntraOp Yves    Narrative  Diagnostic IntraOp Yves    Procedure Performed: Diagnostic IntraOp Yves  Start Time:  9/1/2023 9:05 AM  End Time:   9/1/2023 9:05 AM    Preanesthesia Checklist:  Patient identified, IV assessed, risks and benefits discussed, monitors and equipment assessed, procedure being performed at surgeon's request and anesthesia consent obtained.    General Procedure Information  Diagnostic Indications for Echo:  assessment of ascending aorta, assessment of surgical repair, defect repair evaluation and hemodynamic monitoring  Physician Requesting Echo: Damir Aaron MD  Location performed:  OR  Intubated  Bite block placed  Heart visualized  Probe Insertion:  Easy  Probe Type:  Multiplane  Modalities:  2D only, color flow mapping, continuous  wave Doppler and pulse wave Doppler    Echocardiographic and Doppler Measurements    Ventricles    Right Ventricle:  Cavity size normal.  Thrombus not present.  Global function normal.  Left Ventricle:  Cavity size normal.  Diastolic dimension 3 cm.  Thrombus not present.  Ejection Fraction 55%.        Valves    Aortic Valve:  Annulus normal.  Stenosis not present.  Regurgitation trace.  Leaflets normal.    Mitral Valve:  Annulus normal.  Stenosis not present.  Regurgitation trace.    Tricuspid Valve:  Annulus normal.  Stenosis not present.  Regurgitation trace.      Aorta    Ascending Aorta:  Size normal.  Dissection not present.  Plaque thickness less than 3 mm.  Mobile plaque not present.  Aortic Arch:  Size normal.  Dissection not present.  Plaque thickness greater than 3 mm.  Mobile plaque not present.  Descending Aorta:  Size normal.  Dissection not present.  Plaque thickness greater than 3 mm.  Mobile plaque not present.      Atria    Right Atrium:  Size normal.  Thrombus not present.  Tumor not present.  Device present.    Left Atrium:  Size normal.  Spontaneous echo contrast present.  Thrombus not present.  Tumor not present.  Device not present.  Left atrial appendage normal.      Septa    Atrial Septum:  Intra-atrial septal morphology lipomatous hypertrophy.    Ventricular Septum:  Intra-ventricular septum morphology hypertrophy.    Diastolic Function Measurements:  Diastolic Dysfunction Grade= I  E=  ms  A=  ms  E/A Ratio=  1.2  DT=  ms  S/D=  IVRT=    Other Findings  Pericardium:  normal  Pleural Effusion:  none  Pulmonary Arteries:  normal  Pulmonary Venous Flow:  normal    Anesthesia Information  Performed Personally  Anesthesiologist:  Olivia Patel MD      Echocardiogram Comments:  Severe concentric LV hypertrophy, appears low normal function, LVEF 50-55%.  Normal RV function.  Valves normal.    Post CPB:  S/p CABG  Hyperdynamic LV function, LVEF 65%, inferoseptal hypokinesis  Valves  unchanged.  No aortic dissection post decannulation.      Assessment & Plan   Assessment & Plan     Active Hospital Problems    Diagnosis  POA    **CAD (coronary artery disease) [I25.10]  Yes    Type 2 diabetes mellitus with hyperglycemia [E11.65]  Yes    Hypertension, essential [I10]  Yes    Elevated cholesterol [E78.00]  Yes    Diabetic polyneuropathy associated with type 2 diabetes mellitus [E11.42]  Yes    CAD, multiple vessel [I25.10]  Clinically Undetermined    Coronary atherosclerosis due to calcified coronary lesion (CODE) [I25.84]  Yes      Resolved Hospital Problems   No resolved problems to display.     68-year-old female presents the hospital with CAD status post CABG Hospital medicine has been consulted for diabetes management.    Discussion/plan:    Diabetes: Case discussed with Dr. Muniz, patient is ready to go off of insulin drip.  Discontinue insulin drip.  Patient is typically on 20 units basal insulin at home.  Plan to start at 5 units and monitor response as I expect low oral intake initially.  Plan for correction insulin 4 times daily.  Monitor glucose carefully and plan to titrate insulin as needed pending glucose trend.  I have spoken with his nurse who plans to contact me if she develops any high or low sugars.    CAD: Status post CABG.  Postoperative supportive care and symptom treatment and care per primary service.  Aspirin and statin.    Essential hypertension:  Receiving postoperative pressors.  Beta-blocker.    Hyperlipidemia:  Atorvastatin    History of vitamin D deficiency: Start D3.  Check level in the morning.    Treatment plan discussed with the patient who is in agreement.  Case discussed with cardiothoracic surgery team.  Plan discussed with anesthesiology team.  Discussed with nursing at the bedside.    Thank you for allowing [facility] Medicine Service to provide consultative care for your patient, we will continue to follow while clinically appropriate.    Earnest Christine  MD [place]  09/02/23

## 2023-09-02 NOTE — PROGRESS NOTES
Status post coronary bypass, postop day #1.    CV stable.  Low-dose beta-blocker.  Temporary pacemaker set at [facility] backup.  Keep central line 1 more day.  Pulmonary toileting.  Mobilize.  Oral diet as tolerated.  Nonoliguric.  We will try 1 dose of Lasix this morning.  Afebrile.    Plans for out of CVR.

## 2023-09-02 NOTE — THERAPY EVALUATION
Patient Name: Lennie Gomez  : 1955    MRN: [id number]                              Today's Date: 2023       Admit Date: 2023    Visit Dx: No diagnosis found.  Patient Active Problem List   Diagnosis    Preoperative clearance    Abnormal nuclear stress test    CAD, multiple vessel    Coronary atherosclerosis due to calcified coronary lesion (CODE)    CAD (coronary artery disease)    Type 2 diabetes mellitus with hyperglycemia    Hypertension, essential    Elevated cholesterol    Diabetic polyneuropathy associated with type 2 diabetes mellitus     Past Medical History:   Diagnosis Date    Arthritis     Coronary artery disease     Diabetes mellitus     Elevated cholesterol     Elevated cholesterol 2023    Hypertension     Myocardial infarction      Past Surgical History:   Procedure Laterality Date    CARDIAC CATHETERIZATION      CARDIAC CATHETERIZATION N/A 2023    Procedure: Left Heart Cath with possible coronary angioplasty;  Surgeon: Surendra Sprague MD;  Location: [facility] CATH INVASIVE LOCATION;  Service: Cardiology;  Laterality: N/A;     SECTION        General Information       [facility] Name 23 1338          Physical Therapy Time and Intention    Document Type evaluation  -     Mode of Treatment individual therapy;physical therapy  -       Row Name 23 1867          General Information    Patient Profile Reviewed yes  -     Prior Level of Function independent:;gait;transfer;bed mobility  -     Existing Precautions/Restrictions cardiac;fall;sternal  -     Barriers to Rehab medically complex  -       Row Name 23 1336          Living Environment    People in Home grandchild(sury)  -       Row Name 23 133          Home Main Entrance    Number of Stairs, Main Entrance four  -       Row Name 23 1332          Stairs Within Home, Primary    Number of Stairs, Within Home, Primary none  -       Row Name 23 7539           Cognition    Orientation Status (Cognition) oriented x 3  -       Row Name 09/02/23 1339          Safety Issues, Functional Mobility    Impairments Affecting Function (Mobility) balance;endurance/activity tolerance;strength;shortness of breath;pain  -               User Key  (r) = Recorded By, (t) = Taken By, (c) = Cosigned By      Initials Name Provider Type     Shakila Atkinson PT Physical Therapist                   Mobility       Row Name 09/02/23 1339          Bed Mobility    Comment, (Bed Mobility) NT - UIC  -       Row Name 09/02/23 1339          Sit-Stand Transfer    Sit-Stand [place] (Transfers) minimum assist (75% patient effort);2 person assist;verbal cues;moderate assist (50% patient effort)  -     Assistive Device (Sit-Stand Transfers) other (see comments)  HHA x2  -       Row Name 09/02/23 1339          Gait/Stairs (Locomotion)    [place] Level (Gait) verbal cues;minimum assist (75% patient effort);2 person assist  -     Assistive Device (Gait) other (see comments)  A x2  -     Distance in Feet (Gait) 5ft forward/backward from chair  -     Deviations/Abnormal Patterns (Gait) antalgic;latoya decreased;festinating/shuffling;gait speed decreased;stride length decreased;weight shifting decreased  -     Bilateral Gait Deviations forward flexed posture  -               User Key  (r) = Recorded By, (t) = Taken By, (c) = Cosigned By      Initials Name Provider Type     Shakila Atkinson PT Physical Therapist                   Obj/Interventions       Row Name 09/02/23 1340          Range of Motion Comprehensive    General Range of Motion bilateral lower extremity ROM WFL  -       Row Name 09/02/23 1340          Strength Comprehensive (MMT)    General Manual Muscle Testing (MMT) Assessment lower extremity strength deficits identified  -     Comment, General Manual Muscle Testing (MMT) Assessment Generalized weakness  -       Row Name 09/02/23 1340          Motor  Skills    Therapeutic Exercise --  5 reps B AP/LAQ/seated marches  -       Row Name 09/02/23 1340          Balance    Balance Assessment sitting static balance;sitting dynamic balance;standing static balance;standing dynamic balance  -     Static Sitting Balance standby assist  -     Dynamic Sitting Balance standby assist  -     Position, Sitting Balance unsupported;sitting in chair  -     Static Standing Balance contact guard;verbal cues  -     Dynamic Standing Balance minimal assist;verbal cues  -     Position/Device Used, Standing Balance supported;other (see comments)  HHA x2  -     Balance Interventions sitting;standing;sit to stand;supported;static;dynamic  -       Row Name 09/02/23 1340          Sensory Assessment (Somatosensory)    Sensory Assessment (Somatosensory) LE sensation intact  -               User Key  (r) = Recorded By, (t) = Taken By, (c) = Cosigned By      Initials Name Provider Type     Shakila Atkinson, PT Physical Therapist                   Goals/Plan       Row Name 09/02/23 1343          Problem Specific Goal 1 (PT)    Problem Specific Goal 1 (PT) Cardiac Level 3  -     Time Frame (Problem Specific Goal 1, PT) 1 week  -       Row Name 09/02/23 1343          Therapy Assessment/Plan (PT)    Planned Therapy Interventions (PT) bed mobility training;balance training;gait training;home exercise program;patient/family education;strengthening;stair training;ROM (range of motion);transfer training  [facility]               User Key  (r) = Recorded By, (t) = Taken By, (c) = Cosigned By      Initials Name Provider Type     Shakila Atkinson, PT Physical Therapist                   Clinical Impression       Row Name 09/02/23 1341          Pain    Pretreatment Pain Rating 10/10  -     Posttreatment Pain Rating 10/10  -     Pain Location generalized  -     Pain Location - chest  -     Pain Intervention(s) Repositioned;Ambulation/increased activity;Rest  -       Row Name  09/02/23 1341          Plan of Care Review    Plan of Care Reviewed With patient  -     Outcome Evaluation Pt is a 69 yo F POD 1 CABG x4. Pt lives with her grandchildren and reports independence at BL with no AD. Pt has 4 SOFIA with no steps inside and denies recent falls hx. Pt presents to PT with impaired strength, endurance, and pain limiting overall mobility. Pt stood with min-mod A x2 and completed standing MIP. Able to walk ~5ft away from bed and back, c/o dizziness and significant pain limiting further gait distance. Pt assisted back to sitting and denied further ambulation attempts. Pt stood 1 more time with min A x2 to reposition in the chair and was left with needs met. PT will continue to follow to progress mobility as tolerated. Anticipate DC home with HHPT and family assist pending progress.  -       Row Name 09/02/23 1341          Therapy Assessment/Plan (PT)    Patient/Family Therapy Goals Statement (PT) Return to PLOF  -     Rehab Potential (PT) good, to achieve stated therapy goals  -     Criteria for Skilled Interventions Met (PT) yes  -     Therapy Frequency (PT) daily  -       Row Name 09/02/23 1341          Vital Signs    O2 Delivery Pre Treatment supplemental O2  -     O2 Delivery Intra Treatment supplemental O2  -     O2 Delivery Post Treatment supplemental O2  -       Row Name 09/02/23 1341          Positioning and Restraints    Pre-Treatment Position sitting in chair/recliner  -     Post Treatment Position chair  -BH     In Chair notified nsg;reclined;call light within reach;encouraged to call for assist  -               User Key  (r) = Recorded By, (t) = Taken By, (c) = Cosigned By      Initials Name Provider Type     Shakila Atkinson, PT Physical Therapist                   Outcome Measures       Row Name 09/02/23 134          How much help from another person do you currently need...    Turning from your back to your side while in flat bed without using bedrails? 3   -BH     Moving from lying on back to sitting on the side of a flat bed without bedrails? 2  -BH     Moving to and from a bed to a chair (including a wheelchair)? 3  -BH     Standing up from a chair using your arms (e.g., wheelchair, bedside chair)? 2  -BH     Climbing 3-5 steps with a railing? 2  -BH     To walk in hospital room? 2  -BH     AM-PAC 6 Clicks Score (PT) 14  -     Highest level of mobility 4 --> Transferred to chair/commode  -       Row Name 09/02/23 1344          Functional Assessment    Outcome Measure Options AM-PAC 6 Clicks Basic Mobility (PT)  -               User Key  (r) = Recorded By, (t) = Taken By, (c) = Cosigned By      Initials Name Provider Type     Shakila Atkinson PT Physical Therapist                                 Physical Therapy Education       Title: PT OT SLP Therapies (Done)       Topic: Physical Therapy (Done)       Point: Mobility training (Done)       Learning Progress Summary             Patient Acceptance, E,TB,D, VU,NR by  at 9/2/2023 1344                         Point: Home exercise program (Done)       Learning Progress Summary             Patient Acceptance, E,TB,D, VU,NR by  at 9/2/2023 1344                         Point: Body mechanics (Done)       Learning Progress Summary             Patient Acceptance, E,TB,D, VU,NR by  at 9/2/2023 1344                         Point: Precautions (Done)       Learning Progress Summary             Patient Acceptance, E,TB,D, VU,NR by  at 9/2/2023 1344                                         User Key       Initials Effective Dates Name Provider Type Discipline     04/08/22 -  Shakila Atkinson PT Physical Therapist PT                  PT Recommendation and Plan  Planned Therapy Interventions (PT): bed mobility training, balance training, gait training, home exercise program, patient/family education, strengthening, stair training, ROM (range of motion), transfer training  Plan of Care Reviewed With: patient  Outcome  Evaluation: Pt is a 69 yo F POD 1 CABG x4. Pt lives with her grandchildren and reports independence at BL with no AD. Pt has 4 SOFIA with no steps inside and denies recent falls hx. Pt presents to PT with impaired strength, endurance, and pain limiting overall mobility. Pt stood with min-mod A x2 and completed standing MIP. Able to walk ~5ft away from bed and back, c/o dizziness and significant pain limiting further gait distance. Pt assisted back to sitting and denied further ambulation attempts. Pt stood 1 more time with min A x2 to reposition in the chair and was left with needs met. PT will continue to follow to progress mobility as tolerated. Anticipate DC home with HHPT and family assist pending progress.     Time Calculation:   PT Evaluation Complexity  History, PT Evaluation Complexity: 1-2 personal factors and/or comorbidities  Examination of Body Systems (PT Eval Complexity): total of 3 or more elements  Clinical Presentation (PT Evaluation Complexity): evolving  Clinical Decision Making (PT Evaluation Complexity): moderate complexity  Overall Complexity (PT Evaluation Complexity): moderate complexity     PT Charges       Row Name 09/02/23 1344             Time Calculation    Start Time 0922  -      Stop Time 0940  -      Time Calculation (min) 18 min  -      PT Received On 09/02/23  -      PT - Next Appointment 09/03/23  -      PT Goal Re-Cert Due Date 09/09/23  -         Time Calculation- PT    Total Timed Code Minutes- PT 15 minute(s)  -         Timed Charges    85083 - Gait Training Minutes  8  -      61762 - PT Therapeutic Activity Minutes 7  -BH         Untimed Charges    PT Eval/Re-eval Minutes 5  -BH         Total Minutes    Timed Charges Total Minutes 15  -BH      Untimed Charges Total Minutes 5  -BH       Total Minutes 20  -BH                User Key  (r) = Recorded By, (t) = Taken By, (c) = Cosigned By      Initials Name Provider Type    Shakila Caballero PT Physical Therapist                   Therapy Charges for Today       Code Description Service Date Service Provider Modifiers Qty    46456441940 HC GAIT TRAINING EA 15 MIN 9/2/2023 Shakila Atkinson, PT GP 1    73914951967 HC PT EVAL MOD COMPLEXITY 3 9/2/2023 Shakila Atkinson, PT GP 1    55959747107 HC PT THER SUPP EA 15 MIN 9/2/2023 Shakila Atkinson, PT GP 1            PT G-Codes  Outcome Measure Options: AM-PAC 6 Clicks Basic Mobility (PT)  AM-PAC 6 Clicks Score (PT): 14  PT Discharge Summary  Anticipated Discharge Disposition (PT): home with assist, home with home health, home with 24/7 care    Shakila Atkinson, PT  9/2/2023

## 2023-09-03 ENCOUNTER — APPOINTMENT (OUTPATIENT)
Dept: GENERAL RADIOLOGY | Facility: HOSPITAL | Age: 68
DRG: 236 | End: 2023-09-03
Payer: MEDICARE

## 2023-09-03 LAB
25(OH)D3 SERPL-MCNC: 28.1 NG/ML (ref 30–100)
ANION GAP SERPL CALCULATED.3IONS-SCNC: 9.2 MMOL/L (ref 5–15)
BUN SERPL-MCNC: 9 MG/DL (ref 8–23)
BUN/CREAT SERPL: 15 (ref 7–25)
CALCIUM SPEC-SCNC: 8.7 MG/DL (ref 8.6–10.5)
CHLORIDE SERPL-SCNC: 105 MMOL/L (ref 98–107)
CO2 SERPL-SCNC: 22.8 MMOL/L (ref 22–29)
CREAT SERPL-MCNC: 0.6 MG/DL (ref 0.57–1)
DEPRECATED RDW RBC AUTO: 42.5 FL (ref 37–54)
EGFRCR SERPLBLD CKD-EPI 2021: 98.5 ML/MIN/1.73
ERYTHROCYTE [DISTWIDTH] IN BLOOD BY AUTOMATED COUNT: 12.5 % (ref 12.3–15.4)
GLUCOSE BLDC GLUCOMTR-MCNC: 189 MG/DL (ref 70–130)
GLUCOSE BLDC GLUCOMTR-MCNC: 209 MG/DL (ref 70–130)
GLUCOSE BLDC GLUCOMTR-MCNC: 218 MG/DL (ref 70–130)
GLUCOSE BLDC GLUCOMTR-MCNC: 249 MG/DL (ref 70–130)
GLUCOSE SERPL-MCNC: 187 MG/DL (ref 65–99)
HCT VFR BLD AUTO: 29.6 % (ref 34–46.6)
HGB BLD-MCNC: 9.8 G/DL (ref 12–15.9)
MCH RBC QN AUTO: 30.9 PG (ref 26.6–33)
MCHC RBC AUTO-ENTMCNC: 33.1 G/DL (ref 31.5–35.7)
MCV RBC AUTO: 93.4 FL (ref 79–97)
PLATELET # BLD AUTO: 104 10*3/MM3 (ref 140–450)
PMV BLD AUTO: 11.7 FL (ref 6–12)
POTASSIUM SERPL-SCNC: 4 MMOL/L (ref 3.5–5.2)
QT INTERVAL: 329 MS
QTC INTERVAL: 447 MS
RBC # BLD AUTO: 3.17 10*6/MM3 (ref 3.77–5.28)
SODIUM SERPL-SCNC: 137 MMOL/L (ref 136–145)
WBC NRBC COR # BLD: 8.52 10*3/MM3 (ref 3.4–10.8)

## 2023-09-03 PROCEDURE — 93010 ELECTROCARDIOGRAM REPORT: CPT | Performed by: INTERNAL MEDICINE

## 2023-09-03 PROCEDURE — 99223 1ST HOSP IP/OBS HIGH 75: CPT | Performed by: PSYCHIATRY & NEUROLOGY

## 2023-09-03 PROCEDURE — 63710000001 INSULIN LISPRO (HUMAN) PER 5 UNITS: Performed by: INTERNAL MEDICINE

## 2023-09-03 PROCEDURE — 25010000002 FUROSEMIDE PER 20 MG: Performed by: THORACIC SURGERY (CARDIOTHORACIC VASCULAR SURGERY)

## 2023-09-03 PROCEDURE — 97116 GAIT TRAINING THERAPY: CPT

## 2023-09-03 PROCEDURE — 71045 X-RAY EXAM CHEST 1 VIEW: CPT

## 2023-09-03 PROCEDURE — 63710000001 INSULIN GLARGINE PER 5 UNITS: Performed by: INTERNAL MEDICINE

## 2023-09-03 PROCEDURE — 94799 UNLISTED PULMONARY SVC/PX: CPT

## 2023-09-03 PROCEDURE — 97530 THERAPEUTIC ACTIVITIES: CPT

## 2023-09-03 PROCEDURE — 85027 COMPLETE CBC AUTOMATED: CPT | Performed by: NURSE PRACTITIONER

## 2023-09-03 PROCEDURE — 93005 ELECTROCARDIOGRAM TRACING: CPT | Performed by: NURSE PRACTITIONER

## 2023-09-03 PROCEDURE — 80048 BASIC METABOLIC PNL TOTAL CA: CPT | Performed by: NURSE PRACTITIONER

## 2023-09-03 PROCEDURE — 82306 VITAMIN D 25 HYDROXY: CPT | Performed by: INTERNAL MEDICINE

## 2023-09-03 PROCEDURE — 25010000002 ENOXAPARIN PER 10 MG: Performed by: NURSE PRACTITIONER

## 2023-09-03 PROCEDURE — 99024 POSTOP FOLLOW-UP VISIT: CPT | Performed by: THORACIC SURGERY (CARDIOTHORACIC VASCULAR SURGERY)

## 2023-09-03 PROCEDURE — 82948 REAGENT STRIP/BLOOD GLUCOSE: CPT

## 2023-09-03 RX ORDER — INSULIN LISPRO 100 [IU]/ML
2 INJECTION, SOLUTION INTRAVENOUS; SUBCUTANEOUS
Status: DISCONTINUED | OUTPATIENT
Start: 2023-09-03 | End: 2023-09-03

## 2023-09-03 RX ORDER — SODIUM CHLORIDE 0.9 % (FLUSH) 0.9 %
10 SYRINGE (ML) INJECTION EVERY 12 HOURS SCHEDULED
Status: DISCONTINUED | OUTPATIENT
Start: 2023-09-03 | End: 2023-09-08 | Stop reason: HOSPADM

## 2023-09-03 RX ORDER — AMIODARONE HYDROCHLORIDE 200 MG/1
200 TABLET ORAL
Status: DISCONTINUED | OUTPATIENT
Start: 2023-09-03 | End: 2023-09-07

## 2023-09-03 RX ORDER — ATORVASTATIN CALCIUM 20 MG/1
40 TABLET, FILM COATED ORAL NIGHTLY
Status: DISCONTINUED | OUTPATIENT
Start: 2023-09-03 | End: 2023-09-08 | Stop reason: HOSPADM

## 2023-09-03 RX ORDER — POTASSIUM CHLORIDE 750 MG/1
20 TABLET, FILM COATED, EXTENDED RELEASE ORAL ONCE
Status: COMPLETED | OUTPATIENT
Start: 2023-09-03 | End: 2023-09-03

## 2023-09-03 RX ORDER — SODIUM CHLORIDE 9 MG/ML
40 INJECTION, SOLUTION INTRAVENOUS AS NEEDED
Status: DISCONTINUED | OUTPATIENT
Start: 2023-09-03 | End: 2023-09-08 | Stop reason: HOSPADM

## 2023-09-03 RX ORDER — FUROSEMIDE 10 MG/ML
40 INJECTION INTRAMUSCULAR; INTRAVENOUS ONCE
Status: COMPLETED | OUTPATIENT
Start: 2023-09-03 | End: 2023-09-03

## 2023-09-03 RX ORDER — SODIUM CHLORIDE 0.9 % (FLUSH) 0.9 %
10 SYRINGE (ML) INJECTION AS NEEDED
Status: DISCONTINUED | OUTPATIENT
Start: 2023-09-03 | End: 2023-09-08 | Stop reason: HOSPADM

## 2023-09-03 RX ORDER — INSULIN LISPRO 100 [IU]/ML
3 INJECTION, SOLUTION INTRAVENOUS; SUBCUTANEOUS
Status: DISCONTINUED | OUTPATIENT
Start: 2023-09-03 | End: 2023-09-05

## 2023-09-03 RX ADMIN — SENNOSIDES AND DOCUSATE SODIUM 2 TABLET: 50; 8.6 TABLET ORAL at 20:37

## 2023-09-03 RX ADMIN — ASPIRIN 81 MG: 81 TABLET, COATED ORAL at 09:44

## 2023-09-03 RX ADMIN — ATORVASTATIN CALCIUM 40 MG: 20 TABLET, FILM COATED ORAL at 20:37

## 2023-09-03 RX ADMIN — METOPROLOL TARTRATE 25 MG: 25 TABLET, FILM COATED ORAL at 20:03

## 2023-09-03 RX ADMIN — FUROSEMIDE 40 MG: 10 INJECTION, SOLUTION INTRAMUSCULAR; INTRAVENOUS at 13:09

## 2023-09-03 RX ADMIN — AMIODARONE HYDROCHLORIDE 200 MG: 200 TABLET ORAL at 13:09

## 2023-09-03 RX ADMIN — INSULIN LISPRO 2 UNITS: 100 INJECTION, SOLUTION INTRAVENOUS; SUBCUTANEOUS at 07:02

## 2023-09-03 RX ADMIN — INSULIN GLARGINE 8 UNITS: 100 INJECTION, SOLUTION SUBCUTANEOUS at 09:45

## 2023-09-03 RX ADMIN — INSULIN LISPRO 4 UNITS: 100 INJECTION, SOLUTION INTRAVENOUS; SUBCUTANEOUS at 13:12

## 2023-09-03 RX ADMIN — Medication 10 ML: at 20:37

## 2023-09-03 RX ADMIN — MUPIROCIN 1 APPLICATION: 20 OINTMENT TOPICAL at 20:37

## 2023-09-03 RX ADMIN — PANTOPRAZOLE SODIUM 40 MG: 40 TABLET, DELAYED RELEASE ORAL at 06:08

## 2023-09-03 RX ADMIN — ENOXAPARIN SODIUM 40 MG: 100 INJECTION SUBCUTANEOUS at 20:02

## 2023-09-03 RX ADMIN — INSULIN LISPRO 2 UNITS: 100 INJECTION, SOLUTION INTRAVENOUS; SUBCUTANEOUS at 13:11

## 2023-09-03 RX ADMIN — METOPROLOL TARTRATE 12.5 MG: 25 TABLET, FILM COATED ORAL at 09:44

## 2023-09-03 RX ADMIN — MUPIROCIN 1 APPLICATION: 20 OINTMENT TOPICAL at 09:45

## 2023-09-03 RX ADMIN — POTASSIUM CHLORIDE 20 MEQ: 750 TABLET, EXTENDED RELEASE ORAL at 13:09

## 2023-09-03 RX ADMIN — HYDROCODONE BITARTRATE AND ACETAMINOPHEN 2 TABLET: 5; 325 TABLET ORAL at 05:14

## 2023-09-03 RX ADMIN — Medication 1000 UNITS: at 09:44

## 2023-09-03 RX ADMIN — 0.12% CHLORHEXIDINE GLUCONATE 15 ML: 1.2 RINSE ORAL at 10:00

## 2023-09-03 RX ADMIN — INSULIN LISPRO 4 UNITS: 100 INJECTION, SOLUTION INTRAVENOUS; SUBCUTANEOUS at 20:03

## 2023-09-03 NOTE — THERAPY TREATMENT NOTE
Patient Name: Lennie Gomez  : 1955    MRN: [id number]                              Today's Date: 9/3/2023       Admit Date: 2023    Visit Dx: No diagnosis found.  Patient Active Problem List   Diagnosis    Preoperative clearance    Abnormal nuclear stress test    CAD, multiple vessel    Coronary atherosclerosis due to calcified coronary lesion (CODE)    CAD (coronary artery disease)    Type 2 diabetes mellitus with hyperglycemia    Hypertension, essential    Elevated cholesterol    Diabetic polyneuropathy associated with type 2 diabetes mellitus     Past Medical History:   Diagnosis Date    Arthritis     Coronary artery disease     Diabetes mellitus     Elevated cholesterol     Elevated cholesterol 2023    Hypertension     Myocardial infarction      Past Surgical History:   Procedure Laterality Date    CARDIAC CATHETERIZATION      CARDIAC CATHETERIZATION N/A 2023    Procedure: Left Heart Cath with possible coronary angioplasty;  Surgeon: Surendra Sprague MD;  Location: [facility] CATH INVASIVE LOCATION;  Service: Cardiology;  Laterality: N/A;     SECTION        General Information       Row Name 23 1518          Physical Therapy Time and Intention    Document Type therapy note (daily note)  -     Mode of Treatment individual therapy;physical therapy  -       Row Name 23 1518          General Information    Patient Profile Reviewed yes  -     Existing Precautions/Restrictions cardiac;fall;sternal  -       Row Name 23 1518          Cognition    Orientation Status (Cognition) oriented x 3;other (see comments)  Very drowsy  -       Row Name 23 1518          Safety Issues, Functional Mobility    Impairments Affecting Function (Mobility) balance;endurance/activity tolerance;strength;shortness of breath;pain  -               User Key  (r) = Recorded By, (t) = Taken By, (c) = Cosigned By      Initials Name Provider Type     Demetrio  Shakila NORMAN PT Physical Therapist                   Mobility       [facility] Name 09/03/23 1519          Bed Mobility    Supine-Sit [place] (Bed Mobility) not tested  -     Comment, (Bed Mobility) NT - UIC  -[facility] Name 09/03/23 1519          Sit-Stand Transfer    Sit-Stand [place] (Transfers) minimum assist (75% patient effort);2 person assist;verbal cues  -     Assistive Device (Sit-Stand Transfers) walker, front-wheeled  -[facility] Name 09/03/23 1519          Gait/Stairs (Locomotion)    [place] Level (Gait) verbal cues;1 person assist;1 person to manage equipment;moderate assist (50% patient effort)  -     Assistive Device (Gait) walker, front-wheeled  -     Distance in Feet (Gait) 5ft  -     Deviations/Abnormal Patterns (Gait) antalgic;latoya decreased;festinating/shuffling;gait speed decreased;stride length decreased;weight shifting decreased  -     Bilateral Gait Deviations forward flexed posture  -               User Key  (r) = Recorded By, (t) = Taken By, (c) = Cosigned By      Initials Name Provider Type    Shakila Caballero PT Physical Therapist                   Obj/Interventions       [facility] Name 09/03/23 1522          Motor Skills    Therapeutic Exercise --  5 reps cardiac rehab protocol  -               User Key  (r) = Recorded By, (t) = Taken By, (c) = Cosigned By      Initials Name Provider Type    Shakila Caballero PT Physical Therapist                   Goals/Plan    No documentation.                  Clinical Impression       [facility] Name 09/03/23 1523          Pain    Pre/Posttreatment Pain Comment C/o chest pain with mobility, did not rate  -BH       Row Name 09/03/23 1523          Plan of Care Review    Plan of Care Reviewed With patient  -     Progress no change  -     Outcome Evaluation Pt seen for PT this AM, significant drowsiness limiting progress. Pt sitting UIC on arrival and falling asleep while doing cardiac rehab exercises. Pt stood with min-mod A  x2 and ambulated 5ft away from chair. Pt needing total assist with rwx navigation and heavy cues for sequencing of steps. Pt c/o lightheadedness and fatigue limiting further gait distance, chair pulled up behind her. Pt assisted with repositioning in chair and left with needs met. Noted some RUE coordination deficits during session - needing assist with gripping hand around rwx and noted fairly significant R lateral lean upon standing initially - heavy cues to correct and requiring mod A to maintain standing balance. Improved with static standing, but pt with significant difficult paying attention to task, very lethargic and slow to respond. PT will continue to follow to progress mobility as tolerated. Anticipate DC to SNF.  -       Row Name 09/03/23 1523          Vital Signs    O2 Delivery Pre Treatment supplemental O2  -     O2 Delivery Intra Treatment supplemental O2  -     O2 Delivery Post Treatment supplemental O2  -       Row Name 09/03/23 1523          Positioning and Restraints    Pre-Treatment Position sitting in chair/recliner  -     Post Treatment Position chair  -BH     In Chair notified nsg;reclined;call light within reach;encouraged to call for assist;exit alarm on  -               User Key  (r) = Recorded By, (t) = Taken By, (c) = Cosigned By      Initials Name Provider Type     Shakila Atkinson, PT Physical Therapist                   Outcome Measures       Row Name 09/03/23 1526          How much help from another person do you currently need...    Turning from your back to your side while in flat bed without using bedrails? 2  -BH     Moving from lying on back to sitting on the side of a flat bed without bedrails? 2  -BH     Moving to and from a bed to a chair (including a wheelchair)? 2  -BH     Standing up from a chair using your arms (e.g., wheelchair, bedside chair)? 2  -BH     Climbing 3-5 steps with a railing? 1  -BH     To walk in hospital room? 2  -BH     AM-PAC 6 Clicks Score  (PT) 11  -     Highest level of mobility 4 --> Transferred to chair/commode  -       Row Name 09/03/23 1526          Functional Assessment    Outcome Measure Options AM-PAC 6 Clicks Basic Mobility (PT)  -               User Key  (r) = Recorded By, (t) = Taken By, (c) = Cosigned By      Initials Name Provider Type     Shakila Atkinson, PT Physical Therapist                                 Physical Therapy Education       Title: PT OT SLP Therapies (Done)       Topic: Physical Therapy (Done)       Point: Mobility training (Done)       Learning Progress Summary             Patient Acceptance, E,TB,D, VU,NR by  at 9/3/2023 1527    Acceptance, E,TB,D, VU,NR by  at 9/2/2023 1344                         Point: Home exercise program (Done)       Learning Progress Summary             Patient Acceptance, E,TB,D, VU,NR by  at 9/3/2023 1527    Acceptance, E,TB,D, VU,NR by  at 9/2/2023 1344                         Point: Body mechanics (Done)       Learning Progress Summary             Patient Acceptance, E,TB,D, VU,NR by  at 9/3/2023 1527    Acceptance, E,TB,D, VU,NR by  at 9/2/2023 1344                         Point: Precautions (Done)       Learning Progress Summary             Patient Acceptance, E,TB,D, VU,NR by  at 9/3/2023 1527    Acceptance, E,TB,D, VU,NR by  at 9/2/2023 1344                                         User Key       Initials Effective Dates Name Provider Type [facility] 04/08/22 -  Shakila Atkinson PT Physical Therapist PT                  PT Recommendation and Plan  Planned Therapy Interventions (PT): bed mobility training, balance training, gait training, home exercise program, patient/family education, strengthening, stair training, ROM (range of motion), transfer training  Plan of Care Reviewed With: patient  Progress: no change  Outcome Evaluation: Pt seen for PT this AM, significant drowsiness limiting progress. Pt sitting UIC on arrival and falling asleep while doing  cardiac rehab exercises. Pt stood with min-mod A x2 and ambulated 5ft away from chair. Pt needing total assist with rwx navigation and heavy cues for sequencing of steps. Pt c/o lightheadedness and fatigue limiting further gait distance, chair pulled up behind her. Pt assisted with repositioning in chair and left with needs met. Noted some RUE coordination deficits during session - needing assist with gripping hand around rwx and noted fairly significant R lateral lean upon standing initially - heavy cues to correct and requiring mod A to maintain standing balance. Improved with static standing, but pt with significant difficult paying attention to task, very lethargic and slow to respond. PT will continue to follow to progress mobility as tolerated. Anticipate DC to SNF.     Time Calculation:   PT Evaluation Complexity  History, PT Evaluation Complexity: 1-2 personal factors and/or comorbidities  Examination of Body Systems (PT Eval Complexity): total of 3 or more elements  Clinical Presentation (PT Evaluation Complexity): evolving  Clinical Decision Making (PT Evaluation Complexity): moderate complexity  Overall Complexity (PT Evaluation Complexity): moderate complexity     PT Charges       Row Name 09/03/23 1527             Time Calculation    Start Time 0844  -      Stop Time 0911  -      Time Calculation (min) 27 min  -      PT Received On 09/03/23  -      PT - Next Appointment 09/04/23  -         Time Calculation- PT    Total Timed Code Minutes- PT 27 minute(s)  -         Timed Charges    73231 - PT Therapeutic Exercise Minutes 5  -      27085 - Gait Training Minutes  12  -      30277 - PT Therapeutic Activity Minutes 10  -         Total Minutes    Timed Charges Total Minutes 27  -       Total Minutes 27  -                User Key  (r) = Recorded By, (t) = Taken By, (c) = Cosigned By      Initials Name Provider Type    Shakila Caballero, PT Physical Therapist                  Therapy  Charges for Today       Code Description Service Date Service Provider Modifiers Qty    43089152261 HC GAIT TRAINING EA 15 MIN 9/2/2023 Shakila Atkinson, PT GP 1    48307022127 HC PT EVAL MOD COMPLEXITY 3 9/2/2023 Shakila Atkinson, PT GP 1    01749434540 HC PT THER SUPP EA 15 MIN 9/2/2023 Shakila Atkinson, PT GP 1    98014451383 HC GAIT TRAINING EA 15 MIN 9/3/2023 Shakila Atkinson, PT GP 1    27730152700 HC PT THERAPEUTIC ACT EA 15 MIN 9/3/2023 Shakila Atkinson, PT GP 1    90675387808 HC PT THER SUPP EA 15 MIN 9/3/2023 Shakila Atkinson, PT GP 1            PT G-Codes  Outcome Measure Options: AM-PAC 6 Clicks Basic Mobility (PT)  AM-PAC 6 Clicks Score (PT): 11  PT Discharge Summary  Anticipated Discharge Disposition (PT): skilled nursing facility    Shakila Atkinson PT  9/3/2023

## 2023-09-03 NOTE — PROGRESS NOTES
Status post coronary bypass, postop day #2.    Slightly tachycardic.  Low-dose beta-blocker.  Add low-dose amiodarone.  Remove AV wires tomorrow.  Remove central line today.  Pulmonary toileting.  Mobilize.  Remove chest tubes today.  Oral diet as tolerated.  Nonoliguric.  1 dose of Lasix this morning ordered.  Remove Joiner catheter later in the day.  Afebrile.  She does have some discoordination of her right upper extremity; Occupational Therapy asked to evaluate her.  Although I do not believe this represents a cerebrovascular event I think it would be useful to have neurology comment on this.

## 2023-09-03 NOTE — PROGRESS NOTES
[facility] Medicine Services  PROGRESS NOTE    Patient Name: Lennie Gomez  : 1955  MRN: [id number]    Date of Admission: 2023  Primary Care Physician: Jacqui Patterson APRN    Subjective   Subjective     CC:  Follow-up medical and diabetes management consult post CABG    Subjective:  No new complaints today    Review of Systems  No current fevers or chills  No current shortness of breath or cough  No current nausea, vomiting, or diarrhea  No current chest pain or palpitations      Objective   Objective     Vital Signs:   Temp:  [98.2 °F (36.8 °C)-100.4 °F (38 °C)] 98.4 °F (36.9 °C)  Heart Rate:  [] 114  Resp:  [18-21] 18  BP: (114-153)/(59-75) 117/59        Physical Exam:  Constitutional:Awake, alert  HENT: NCAT, mucous membranes moist, neck supple  Respiratory: No cough or wheezing, nonlabored respirations currently on supplemental oxygen  Cardiovascular: Normal pulse rate palpable radial pulses bilaterally  Gastrointestinal: soft, nontender, nondistended  Musculoskeletal: Postoperative sternotomy changes, BMI is 27, minimal bilateral ankle edema, no clubbing or cyanosis to extremities  Psychiatric: Appropriate affect, cooperative  Neurologic: Oriented, strength symmetric in all extremities, Cranial Nerves grossly intact to confrontation, speech clear  Skin: No rashes or jaundice         Results Reviewed:  Results from last 7 days   Lab Units 23  0356 23  0257 23  1347 23  1026 23  0712 23  1132   WBC 10*3/mm3 8.52 7.39 6.85 5.98  --  6.91   HEMOGLOBIN g/dL 9.8* 9.9* 10.3* 9.9*  --  13.6   HEMOGLOBIN, POC   --   --   --   --    < >  --    HEMATOCRIT % 29.6* 30.4* 31.5* 29.9*  --  41.5   HEMATOCRIT POC   --   --   --   --    < >  --    PLATELETS 10*3/mm3 104* 104* 102* 109*  --  160   INR   --  1.21*  --  1.32*  --  1.03    < > = values in this interval not displayed.     Results from last 7 days   Lab Units 23  0356 23  0257  09/01/23 2003 09/01/23  1347 09/01/23  0319 08/31/23  1132   SODIUM mmol/L 137 141  --  142   < > 138   POTASSIUM mmol/L 4.0 4.2 3.9 4.2   < > 3.8   CHLORIDE mmol/L 105 108*  --  111*   < > 101   CO2 mmol/L 22.8 21.0*  --  24.0   < > 24.0   BUN mg/dL 9 9  --  9   < > 9   CREATININE mg/dL 0.60 0.57  --  0.71   < > 0.65   GLUCOSE mg/dL 187* 161*  --  102*   < > 263*   CALCIUM mg/dL 8.7 8.6  --  8.9   < > 9.4   ALT (SGPT) U/L  --   --   --   --   --  12   AST (SGOT) U/L  --   --   --   --   --  13   PROBNP pg/mL  --   --   --   --   --  383.0    < > = values in this interval not displayed.     Estimated Creatinine Clearance: 83.6 mL/min (by C-G formula based on SCr of 0.6 mg/dL).    Microbiology Results Abnormal       Procedure Component Value - Date/Time    COVID PRE-OP / PRE-PROCEDURE SCREENING ORDER (NO ISOLATION) - Swab, Nasopharynx [[id number]]  (Normal) Collected: 08/31/23 1337    Lab Status: Final result Specimen: Swab from Nasopharynx Updated: 08/31/23 1523    Narrative:      The following orders were created for panel order COVID PRE-OP / PRE-PROCEDURE SCREENING ORDER (NO ISOLATION) - Swab, Nasopharynx.  Procedure                               Abnormality         Status                     ---------                               -----------         ------                     COVID-19,[facility] IN-HOUSE...[[id number]]  Normal              Final result                 Please view results for these tests on the individual orders.    COVID-19,[facility] IN-HOUSE CEPHEID/MEGAN NP SWAB IN TRANSPORT MEDIA 8-12 HR TAT - Swab, Nasopharynx [[id number]]  (Normal) Collected: 08/31/23 1337    Lab Status: Final result Specimen: Swab from Nasopharynx Updated: 08/31/23 1523     COVID19 Not Detected    Narrative:      Fact sheet for providers: https://www.fda.gov/media/510274/download    Fact sheet for patients: https://www.fda.gov/media/937594/download    Test performed by PCR.            Imaging Results (Last 24 Hours)       Procedure  Component Value Units Date/Time    XR Chest 1 View [[id number]] Collected: 09/03/23 0556     Updated: 09/03/23 0600    Narrative:      SINGLE VIEW OF THE CHEST     HISTORY: Postop open heart surgery     COMPARISON: September 2, 2023     FINDINGS:  Tryon-Tamara catheter has been removed. Remaining tubes and lines are  stable. There is cardiomegaly. There is some vascular congestion. There  is increasing right basilar atelectasis. There are bilateral effusions.  Right pleural effusion has increased.       Impression:      Increasing right basilar atelectasis and right pleural effusion.     This report was finalized on 9/3/2023 5:57 AM by Dr. Rosalba Villela M.D.               Results for orders placed in visit on 09/01/23    Diagnostic IntraOp Yves    Narrative  Diagnostic IntraOp Yves    Procedure Performed: Diagnostic IntraOp Yves  Start Time:  9/1/2023 9:05 AM  End Time:   9/1/2023 9:05 AM    Preanesthesia Checklist:  Patient identified, IV assessed, risks and benefits discussed, monitors and equipment assessed, procedure being performed at surgeon's request and anesthesia consent obtained.    General Procedure Information  Diagnostic Indications for Echo:  assessment of ascending aorta, assessment of surgical repair, defect repair evaluation and hemodynamic monitoring  Physician Requesting Echo: Damir Aaron MD  Location performed:  OR  Intubated  Bite block placed  Heart visualized  Probe Insertion:  Easy  Probe Type:  Multiplane  Modalities:  2D only, color flow mapping, continuous wave Doppler and pulse wave Doppler    Echocardiographic and Doppler Measurements    Ventricles    Right Ventricle:  Cavity size normal.  Thrombus not present.  Global function normal.  Left Ventricle:  Cavity size normal.  Diastolic dimension 3 cm.  Thrombus not present.  Ejection Fraction 55%.        Valves    Aortic Valve:  Annulus normal.  Stenosis not present.  Regurgitation trace.  Leaflets normal.    Mitral Valve:  Annulus  normal.  Stenosis not present.  Regurgitation trace.    Tricuspid Valve:  Annulus normal.  Stenosis not present.  Regurgitation trace.      Aorta    Ascending Aorta:  Size normal.  Dissection not present.  Plaque thickness less than 3 mm.  Mobile plaque not present.  Aortic Arch:  Size normal.  Dissection not present.  Plaque thickness greater than 3 mm.  Mobile plaque not present.  Descending Aorta:  Size normal.  Dissection not present.  Plaque thickness greater than 3 mm.  Mobile plaque not present.      Atria    Right Atrium:  Size normal.  Thrombus not present.  Tumor not present.  Device present.    Left Atrium:  Size normal.  Spontaneous echo contrast present.  Thrombus not present.  Tumor not present.  Device not present.  Left atrial appendage normal.      Septa    Atrial Septum:  Intra-atrial septal morphology lipomatous hypertrophy.    Ventricular Septum:  Intra-ventricular septum morphology hypertrophy.    Diastolic Function Measurements:  Diastolic Dysfunction Grade= I  E=  ms  A=  ms  E/A Ratio=  1.2  DT=  ms  S/D=  IVRT=    Other Findings  Pericardium:  normal  Pleural Effusion:  none  Pulmonary Arteries:  normal  Pulmonary Venous Flow:  normal    Anesthesia Information  Performed Personally  Anesthesiologist:  Olivia Patel MD      Echocardiogram Comments:  Severe concentric LV hypertrophy, appears low normal function, LVEF 50-55%.  Normal RV function.  Valves normal.    Post CPB:  S/p CABG  Hyperdynamic LV function, LVEF 65%, inferoseptal hypokinesis  Valves unchanged.  No aortic dissection post decannulation.      I have reviewed the medications:  Scheduled Meds:aspirin, 81 mg, Oral, Daily  atorvastatin, 40 mg, Oral, Nightly  chlorhexidine, 15 mL, Mouth/Throat, Q12H  cholecalciferol, 1,000 Units, Oral, Daily  enoxaparin, 40 mg, Subcutaneous, Daily  insulin glargine, 8 Units, Subcutaneous, Daily  insulin lispro, 2 Units, Subcutaneous, TID With Meals  insulin lispro, 2-9 Units, Subcutaneous, 4x  Daily AC & at Bedtime  metoprolol tartrate, 12.5 mg, Oral, Q12H  mupirocin, , Each Nare, BID  pantoprazole, 40 mg, Oral, QAM  senna-docusate sodium, 2 tablet, Oral, Nightly      Continuous Infusions:clevidipine, 2-32 mg/hr  dexmedetomidine, 0.2-1.5 mcg/kg/hr, Last Rate: Stopped (09/01/23 1330)  DOPamine, 2-20 mcg/kg/min  EPINEPHrine, 0.02-0.1 mcg/kg/min  milrinone, 0.25-0.375 mcg/kg/min  niCARdipine, 5-15 mg/hr, Last Rate: Stopped (09/01/23 1156)  nitroglycerin, 5-200 mcg/min  norepinephrine, 0.02-0.2 mcg/kg/min, Last Rate: Stopped (09/01/23 1640)  phenylephrine, 0.2-2 mcg/kg/min  propofol, 5-50 mcg/kg/min, Last Rate: Stopped (09/01/23 1205)  sodium chloride, 30 mL/hr, Last Rate: 30 mL/hr (09/01/23 1120)      PRN Meds:.  acetaminophen **OR** acetaminophen **OR** acetaminophen    ALPRAZolam    aluminum-magnesium hydroxide-simethicone    bisacodyl    bisacodyl    clevidipine    cyclobenzaprine    dextrose    dextrose    DOPamine    EPINEPHrine    glucagon (human recombinant)    HYDROcodone-acetaminophen    midazolam    milrinone    Morphine **AND** naloxone    Morphine    niCARdipine    nitroglycerin    norepinephrine    ondansetron    oxyCODONE    phenylephrine    polyethylene glycol    Potassium Replacement - Follow Nurse / BPA Driven Protocol    propofol    Assessment & Plan   Assessment & Plan     Active Hospital Problems    Diagnosis  POA    **CAD (coronary artery disease) [I25.10]  Yes    Type 2 diabetes mellitus with hyperglycemia [E11.65]  Yes    Hypertension, essential [I10]  Yes    Elevated cholesterol [E78.00]  Yes    Diabetic polyneuropathy associated with type 2 diabetes mellitus [E11.42]  Yes    CAD, multiple vessel [I25.10]  Clinically Undetermined    Coronary atherosclerosis due to calcified coronary lesion (CODE) [I25.84]  Yes      Resolved Hospital Problems   No resolved problems to display.        Brief Hospital Course to date:  Lennie Gomez is a 68 y.o. female  presents the hospital with CAD  status post CABG Hospital medicine has been consulted for diabetes management.     Discussion/plan:  Glucose reviewed and insulin further adjusted today.  I anticipate with as she resumes more oral intake glucose will continue to rise.  I have adjusted basal insulin and added prandial and will continue correction scale.  Plan to monitor glucose today and adjust further as needed.     Diabetes:   Monitor glucose and adjust insulin as needed.     CAD: Status post CABG.  Postoperative supportive care and symptom treatment and care per primary service.  Aspirin and statin.  Case has been discussed with cardiothoracic surgery team and with cardiac anesthesiologist.     Essential hypertension:  Receiving postoperative pressors.  Beta-blocker.     Hyperlipidemia:  Atorvastatin     vitamin D deficiency: Started D3.  Current level is 28     Treatment plan discussed with the patient who is in agreement.      CODE STATUS:   Code Status and Medical Interventions:   Ordered at: 09/01/23 1012     Code Status (Patient has no pulse and is not breathing):    CPR (Attempt to Resuscitate)     Medical Interventions (Patient has pulse or is breathing):    Full Support       Earnest Perrin MD  09/03/23

## 2023-09-03 NOTE — CONSULTS
"Neurology Consult Note    Consult Date: 9/3/2023    Referring MD: Damir Aaron MD    Reason for Consult I have been asked to see the patient in neurological consultation to render advice and opinion regarding right arm apraxia    Lennie Gomez is a 68 y.o. female with CAD, diabetes, hyperlipidemia, hypertension, status post CABG, now in the stepdown unit.  Nursing staff noticed lethargy and right arm weakness and apraxia.  Symptoms have failed to improve and therefore I was consulted.  Last known normal was prior to her CABG.    Of note the patient does have bilateral carotid stenosis, that is felt to be nearly 90% based on last CTA performed 6/19 of this year.    Past Medical History:   Diagnosis Date    Arthritis     Coronary artery disease 1996    Diabetes mellitus     Elevated cholesterol     Elevated cholesterol 9/2/2023    Hypertension 1996    Myocardial infarction 1996       Exam  /66 (BP Location: Right arm, Patient Position: Sitting)   Pulse 98   Temp 99.4 °F (37.4 °C) (Oral)   Resp 18   Ht 157.5 cm (62.01\")   Wt 70.3 kg (154 lb 15.7 oz)   SpO2 95%   BMI 28.34 kg/m²   Gen: NAD, vitals reviewed  MS: Oriented, lethargic, memory impaired, attention/concentration impaired, language intact, no neglect  CN: visual acuity grossly normal, PERRL, EOMI, no facial droop, mild to moderate dysarthria  Motor: 4/5 right upper extremity with significant apraxia, 5/5 left upper and bilateral lower extremities, decreased tone right arm    DATA:    Lab Results   Component Value Date    GLUCOSE 187 (H) 09/03/2023    CALCIUM 8.7 09/03/2023     09/03/2023    K 4.0 09/03/2023    CO2 22.8 09/03/2023     09/03/2023    BUN 9 09/03/2023    CREATININE 0.60 09/03/2023    BCR 15.0 09/03/2023    ANIONGAP 9.2 09/03/2023     Lab Results   Component Value Date    WBC 8.52 09/03/2023    HGB 9.8 (L) 09/03/2023    HCT 29.6 (L) 09/03/2023    MCV 93.4 09/03/2023     (L) 09/03/2023       Lab review: " Hemoglobin 9.8, platelets 104, BMP unremarkable    Imaging review: CTA report reviewed from June as described above which showed around 50% on the right and between 70 and 90% on the left.    Diagnoses:  CAD status post CABG  Right arm weakness and apraxia  Bilateral carotid stenosis, likely symptomatic on the left    Pre-stroke MRS: 0  NIHSS: 3    Comment: Clinically has a small left motor strip infarct.  This is suggestive of symptomatic left carotid disease given her known approximately 90% stenosis.  However, given that she was recently on the bypass pump for cardiothoracic surgery this would be the obvious provoking factor. I would favor deferring vascular surgery evaluation to the outpatient setting so that she can further recover from cardiac surgery.    PLAN:  -Continue aspirin.  I would add Plavix to aspirin and continue DAPT when okay from a CTS standpoint  -Could consider brain MRI after pacer wires removed  -High-dose statin  -She will need a vascular surgery evaluation sometime in the near term but this could potentially be deferred to the office setting.  -PT/OT    We will follow

## 2023-09-03 NOTE — PLAN OF CARE
Goal Outcome Evaluation:  Plan of Care Reviewed With: patient        Progress: no change  Outcome Evaluation: Pt seen for PT this AM, significant drowsiness limiting progress. Pt sitting UIC on arrival and falling asleep while doing cardiac rehab exercises. Pt stood with min-mod A x2 and ambulated 5ft away from chair. Pt needing total assist with rwx navigation and heavy cues for sequencing of steps. Pt c/o lightheadedness and fatigue limiting further gait distance, chair pulled up behind her. Pt assisted with repositioning in chair and left with needs met. Noted some RUE coordination deficits during session - needing assist with gripping hand around rwx and noted fairly significant R lateral lean upon standing initially - heavy cues to correct and requiring mod A to maintain standing balance. Improved with static standing, but pt with significant difficult paying attention to task, very lethargic and slow to respond. PT will continue to follow to progress mobility as tolerated. Anticipate DC to SNF.      Anticipated Discharge Disposition (PT): skilled nursing facility

## 2023-09-03 NOTE — PLAN OF CARE
Goal Outcome Evaluation:  Plan of Care Reviewed With: patient        Progress: no change  Outcome Evaluation: A/O, medicated for pain, 2 person assist, encouraged to use IS and deep breathe, increased 02 to 6L to maintain sats 92%, CT X2, bob to gravity, external pacemaker, RIJ, IVF at TKO, IV ABX, assessment ongoing.

## 2023-09-04 ENCOUNTER — APPOINTMENT (OUTPATIENT)
Dept: GENERAL RADIOLOGY | Facility: HOSPITAL | Age: 68
DRG: 236 | End: 2023-09-04
Payer: MEDICARE

## 2023-09-04 LAB
ANION GAP SERPL CALCULATED.3IONS-SCNC: 13 MMOL/L (ref 5–15)
BH BB BLOOD EXPIRATION DATE: NORMAL
BH BB BLOOD EXPIRATION DATE: NORMAL
BH BB BLOOD TYPE BARCODE: 5100
BH BB BLOOD TYPE BARCODE: 5100
BH BB DISPENSE STATUS: NORMAL
BH BB DISPENSE STATUS: NORMAL
BH BB PRODUCT CODE: NORMAL
BH BB PRODUCT CODE: NORMAL
BH BB UNIT NUMBER: NORMAL
BH BB UNIT NUMBER: NORMAL
BUN SERPL-MCNC: 11 MG/DL (ref 8–23)
BUN/CREAT SERPL: 17.7 (ref 7–25)
CALCIUM SPEC-SCNC: 9.3 MG/DL (ref 8.6–10.5)
CHLORIDE SERPL-SCNC: 99 MMOL/L (ref 98–107)
CHOLEST SERPL-MCNC: 69 MG/DL (ref 0–200)
CO2 SERPL-SCNC: 26 MMOL/L (ref 22–29)
CREAT SERPL-MCNC: 0.62 MG/DL (ref 0.57–1)
CROSSMATCH INTERPRETATION: NORMAL
CROSSMATCH INTERPRETATION: NORMAL
DEPRECATED RDW RBC AUTO: 41.5 FL (ref 37–54)
EGFRCR SERPLBLD CKD-EPI 2021: 97.7 ML/MIN/1.73
ERYTHROCYTE [DISTWIDTH] IN BLOOD BY AUTOMATED COUNT: 12.2 % (ref 12.3–15.4)
GLUCOSE BLDC GLUCOMTR-MCNC: 171 MG/DL (ref 70–130)
GLUCOSE BLDC GLUCOMTR-MCNC: 178 MG/DL (ref 70–130)
GLUCOSE BLDC GLUCOMTR-MCNC: 191 MG/DL (ref 70–130)
GLUCOSE BLDC GLUCOMTR-MCNC: 194 MG/DL (ref 70–130)
GLUCOSE BLDC GLUCOMTR-MCNC: 204 MG/DL (ref 70–130)
GLUCOSE BLDC GLUCOMTR-MCNC: 246 MG/DL (ref 70–130)
GLUCOSE SERPL-MCNC: 186 MG/DL (ref 65–99)
HCT VFR BLD AUTO: 32.6 % (ref 34–46.6)
HDLC SERPL-MCNC: 24 MG/DL (ref 40–60)
HGB BLD-MCNC: 10.7 G/DL (ref 12–15.9)
LDLC SERPL CALC-MCNC: 24 MG/DL (ref 0–100)
LDLC/HDLC SERPL: 0.98 {RATIO}
MCH RBC QN AUTO: 30.6 PG (ref 26.6–33)
MCHC RBC AUTO-ENTMCNC: 32.8 G/DL (ref 31.5–35.7)
MCV RBC AUTO: 93.1 FL (ref 79–97)
PLATELET # BLD AUTO: 128 10*3/MM3 (ref 140–450)
PMV BLD AUTO: 11.1 FL (ref 6–12)
POTASSIUM SERPL-SCNC: 3.7 MMOL/L (ref 3.5–5.2)
RBC # BLD AUTO: 3.5 10*6/MM3 (ref 3.77–5.28)
SODIUM SERPL-SCNC: 138 MMOL/L (ref 136–145)
TRIGL SERPL-MCNC: 108 MG/DL (ref 0–150)
UNIT  ABO: NORMAL
UNIT  ABO: NORMAL
UNIT  RH: NORMAL
UNIT  RH: NORMAL
VLDLC SERPL-MCNC: 21 MG/DL (ref 5–40)
WBC NRBC COR # BLD: 8.66 10*3/MM3 (ref 3.4–10.8)

## 2023-09-04 PROCEDURE — 97116 GAIT TRAINING THERAPY: CPT

## 2023-09-04 PROCEDURE — 80048 BASIC METABOLIC PNL TOTAL CA: CPT | Performed by: NURSE PRACTITIONER

## 2023-09-04 PROCEDURE — 63710000001 INSULIN LISPRO (HUMAN) PER 5 UNITS: Performed by: INTERNAL MEDICINE

## 2023-09-04 PROCEDURE — 63710000001 INSULIN GLARGINE PER 5 UNITS: Performed by: INTERNAL MEDICINE

## 2023-09-04 PROCEDURE — 82948 REAGENT STRIP/BLOOD GLUCOSE: CPT

## 2023-09-04 PROCEDURE — 71046 X-RAY EXAM CHEST 2 VIEWS: CPT

## 2023-09-04 PROCEDURE — 80061 LIPID PANEL: CPT | Performed by: PSYCHIATRY & NEUROLOGY

## 2023-09-04 PROCEDURE — 25010000002 ENOXAPARIN PER 10 MG: Performed by: NURSE PRACTITIONER

## 2023-09-04 PROCEDURE — 99232 SBSQ HOSP IP/OBS MODERATE 35: CPT | Performed by: PSYCHIATRY & NEUROLOGY

## 2023-09-04 PROCEDURE — 85027 COMPLETE CBC AUTOMATED: CPT | Performed by: NURSE PRACTITIONER

## 2023-09-04 RX ORDER — CLOPIDOGREL BISULFATE 75 MG/1
75 TABLET ORAL DAILY
Status: DISCONTINUED | OUTPATIENT
Start: 2023-09-04 | End: 2023-09-08 | Stop reason: HOSPADM

## 2023-09-04 RX ADMIN — Medication 10 ML: at 09:34

## 2023-09-04 RX ADMIN — INSULIN LISPRO 3 UNITS: 100 INJECTION, SOLUTION INTRAVENOUS; SUBCUTANEOUS at 11:39

## 2023-09-04 RX ADMIN — SENNOSIDES AND DOCUSATE SODIUM 2 TABLET: 50; 8.6 TABLET ORAL at 21:37

## 2023-09-04 RX ADMIN — INSULIN LISPRO 3 UNITS: 100 INJECTION, SOLUTION INTRAVENOUS; SUBCUTANEOUS at 06:40

## 2023-09-04 RX ADMIN — CLOPIDOGREL BISULFATE 75 MG: 75 TABLET, FILM COATED ORAL at 17:18

## 2023-09-04 RX ADMIN — INSULIN GLARGINE 10 UNITS: 100 INJECTION, SOLUTION SUBCUTANEOUS at 09:34

## 2023-09-04 RX ADMIN — ENOXAPARIN SODIUM 40 MG: 100 INJECTION SUBCUTANEOUS at 17:18

## 2023-09-04 RX ADMIN — INSULIN LISPRO 2 UNITS: 100 INJECTION, SOLUTION INTRAVENOUS; SUBCUTANEOUS at 06:05

## 2023-09-04 RX ADMIN — ASPIRIN 81 MG: 81 TABLET, COATED ORAL at 09:35

## 2023-09-04 RX ADMIN — MUPIROCIN 1 APPLICATION: 20 OINTMENT TOPICAL at 21:37

## 2023-09-04 RX ADMIN — METOPROLOL TARTRATE 25 MG: 25 TABLET, FILM COATED ORAL at 21:37

## 2023-09-04 RX ADMIN — AMIODARONE HYDROCHLORIDE 200 MG: 200 TABLET ORAL at 09:35

## 2023-09-04 RX ADMIN — PANTOPRAZOLE SODIUM 40 MG: 40 TABLET, DELAYED RELEASE ORAL at 05:46

## 2023-09-04 RX ADMIN — Medication 1000 UNITS: at 09:35

## 2023-09-04 RX ADMIN — INSULIN LISPRO 3 UNITS: 100 INJECTION, SOLUTION INTRAVENOUS; SUBCUTANEOUS at 17:18

## 2023-09-04 RX ADMIN — METOPROLOL TARTRATE 25 MG: 25 TABLET, FILM COATED ORAL at 09:35

## 2023-09-04 RX ADMIN — Medication 10 ML: at 21:38

## 2023-09-04 RX ADMIN — INSULIN LISPRO 4 UNITS: 100 INJECTION, SOLUTION INTRAVENOUS; SUBCUTANEOUS at 17:18

## 2023-09-04 RX ADMIN — INSULIN LISPRO 2 UNITS: 100 INJECTION, SOLUTION INTRAVENOUS; SUBCUTANEOUS at 11:39

## 2023-09-04 RX ADMIN — INSULIN LISPRO 4 UNITS: 100 INJECTION, SOLUTION INTRAVENOUS; SUBCUTANEOUS at 21:37

## 2023-09-04 RX ADMIN — ACETAMINOPHEN 650 MG: 325 TABLET, FILM COATED ORAL at 01:01

## 2023-09-04 RX ADMIN — ATORVASTATIN CALCIUM 40 MG: 20 TABLET, FILM COATED ORAL at 21:37

## 2023-09-04 RX ADMIN — MUPIROCIN 1 APPLICATION: 20 OINTMENT TOPICAL at 09:35

## 2023-09-04 NOTE — NURSING NOTE
Pt unable to perform any fine motor skills with r.hand , pt able to lift r. Arm upon command and able to open/close and squeeze .Pt able to move all other extremities appropriately.dr pulliam aware and at bedside. Orders for neurology consult placed. Bedside dysphagia screen complete no s/sx of aspiration noted. Cvl and bob along with ct removed per orders today. Okay to resume diet per neurology.

## 2023-09-04 NOTE — PROGRESS NOTES
[facility] Medicine Services  PROGRESS NOTE    Patient Name: Lennie Gomez  : 1955  MRN: [id number]    Date of Admission: 2023  Primary Care Physician: Jacqui Patterson APRN    Subjective   Subjective     CC:  Follow-up medical and diabetes management consult post CABG    Subjective:  Patient states occasional pain.  Appetite is still slow to improve.    Review of Systems  No current fevers or chills  No current shortness of breath or cough  No current nausea, vomiting, or diarrhea  No current chest pain or palpitations      Objective   Objective     Vital Signs:   Temp:  [97.8 °F (36.6 °C)-99.9 °F (37.7 °C)] 98 °F (36.7 °C)  Heart Rate:  [] 98  Resp:  [18] 18  BP: (114-168)/(57-74) 114/57  Total (NIH Stroke Scale): 2     Physical Exam:  Constitutional:Awake, alert  HENT: NCAT, mucous membranes moist, neck supple  Respiratory: No cough or wheezing, nonlabored respirations currently on supplemental oxygen  Cardiovascular: Normal pulse rate palpable radial pulses bilaterally  Gastrointestinal: soft, nontender, nondistended  Musculoskeletal: Postoperative sternotomy changes, BMI is 27, minimal bilateral ankle edema, no clubbing or cyanosis to extremities  Psychiatric: Calm affect, cooperative  Neurologic: Oriented, strength symmetric in all extremities, Cranial Nerves grossly intact to confrontation, speech clear  Skin: No rashes or jaundice         Results Reviewed:  Results from last 7 days   Lab Units 23  0319 23  0356 23  0257 23  1347 23  1026 23  0712 23  1132   WBC 10*3/mm3 8.66 8.52 7.39   < > 5.98  --  6.91   HEMOGLOBIN g/dL 10.7* 9.8* 9.9*   < > 9.9*  --  13.6   HEMOGLOBIN, POC   --   --   --   --   --    < >  --    HEMATOCRIT % 32.6* 29.6* 30.4*   < > 29.9*  --  41.5   HEMATOCRIT POC   --   --   --   --   --    < >  --    PLATELETS 10*3/mm3 128* 104* 104*   < > 109*  --  160   INR   --   --  1.21*  --  1.32*  --  1.03    < > = values  in this interval not displayed.       Results from last 7 days   Lab Units 09/04/23  0319 09/03/23  0356 09/02/23  0257 09/01/23  0319 08/31/23  1132   SODIUM mmol/L 138 137 141   < > 138   POTASSIUM mmol/L 3.7 4.0 4.2   < > 3.8   CHLORIDE mmol/L 99 105 108*   < > 101   CO2 mmol/L 26.0 22.8 21.0*   < > 24.0   BUN mg/dL 11 9 9   < > 9   CREATININE mg/dL 0.62 0.60 0.57   < > 0.65   GLUCOSE mg/dL 186* 187* 161*   < > 263*   CALCIUM mg/dL 9.3 8.7 8.6   < > 9.4   ALT (SGPT) U/L  --   --   --   --  12   AST (SGOT) U/L  --   --   --   --  13   PROBNP pg/mL  --   --   --   --  383.0    < > = values in this interval not displayed.       Estimated Creatinine Clearance: 81.5 mL/min (by C-G formula based on SCr of 0.62 mg/dL).    Microbiology Results Abnormal       Procedure Component Value - Date/Time    COVID PRE-OP / PRE-PROCEDURE SCREENING ORDER (NO ISOLATION) - Swab, Nasopharynx [[id number]]  (Normal) Collected: 08/31/23 1337    Lab Status: Final result Specimen: Swab from Nasopharynx Updated: 08/31/23 1523    Narrative:      The following orders were created for panel order COVID PRE-OP / PRE-PROCEDURE SCREENING ORDER (NO ISOLATION) - Swab, Nasopharynx.  Procedure                               Abnormality         Status                     ---------                               -----------         ------                     COVID-19,[facility]-HOUSE...[[id number]]  Normal              Final result                 Please view results for these tests on the individual orders.    COVID-19,[facility]-HOU[facility] CEPHEID/MEGAN NP SWAB IN TRANSPORT MEDIA 8-12 HR TAT - Swab, Nasopharynx [[id number]]  (Normal) Collected: 08/31/23 1337    Lab Status: Final result Specimen: Swab from Nasopharynx Updated: 08/31/23 1523     COVID19 Not Detected    Narrative:      Fact sheet for providers: https://www.fda.gov/media/023784/download    Fact sheet for patients: https://www.fda.gov/media/185276/download    Test performed by PCR.            Imaging  Results (Last 24 Hours)       Procedure Component Value Units Date/Time    XR Chest PA & Lateral [[id number]] Collected: 09/04/23 0834     Updated: 09/04/23 0848    Narrative:      XR CHEST PA AND LATERAL-     HISTORY: Female who is 68 years-old, postoperative evaluation     TECHNIQUE: Frontal and lateral views of the chest     COMPARISON: 9/3/2023     FINDINGS: Heart, mediastinum and pulmonary vasculature are unremarkable.  Sternotomy wires are present. Bibasilar  atelectasis/infiltrate/effusions, similar to prior exam. No  pneumothorax. No acute osseous process.       Impression:      No significant change.           This report was finalized on 9/4/2023 8:45 AM by Dr. Jed Lawrence M.D.       XR Chest 1 View [[id number]] Collected: 09/03/23 2153     Updated: 09/03/23 2157    Narrative:      SINGLE VIEW OF THE CHEST     HISTORY: Decreased breath sounds     COMPARISON: September 3, 2023     FINDINGS:  There is cardiomegaly. A right internal jugular vein introducer has been  removed. There is some vascular congestion. There is persistent  bibasilar consolidation and bilateral effusions. No pneumothorax is  seen.       Impression:      Persistent vascular congestion, as well as bibasilar consolidation and  bilateral effusions.     This report was finalized on 9/3/2023 9:54 PM by Dr. Rosalba Villela M.D.       XR Chest 1 View [[id number]] Collected: 09/03/23 1547     Updated: 09/03/23 1552    Narrative:      Portable chest x-ray     HISTORY: Chest tube removal.     TECHNIQUE: Portable chest x-ray 3:24 p.m. is correlated with chest x-ray  from 4:47 a.m. this morning.     FINDINGS: There is been interval chest tube removal. No pneumothorax.  Right IJ introducer remains. There is basilar atelectasis and small  pleural effusions with some improvement in aeration in the lungs  compared with the x-ray this morning.       Impression:      No pneumothorax following chest tube removal.     This report was finalized on  9/3/2023 3:49 PM by Dr. Raymond Barger M.D.               Results for orders placed in visit on 09/01/23    Diagnostic IntraOp Yves    Narrative  Diagnostic IntraOp Yves    Procedure Performed: Diagnostic IntraOp Yves  Start Time:  9/1/2023 9:05 AM  End Time:   9/1/2023 9:05 AM    Preanesthesia Checklist:  Patient identified, IV assessed, risks and benefits discussed, monitors and equipment assessed, procedure being performed at surgeon's request and anesthesia consent obtained.    General Procedure Information  Diagnostic Indications for Echo:  assessment of ascending aorta, assessment of surgical repair, defect repair evaluation and hemodynamic monitoring  Physician Requesting Echo: Damir Aaron MD  Location performed:  OR  Intubated  Bite block placed  Heart visualized  Probe Insertion:  Easy  Probe Type:  Multiplane  Modalities:  2D only, color flow mapping, continuous wave Doppler and pulse wave Doppler    Echocardiographic and Doppler Measurements    Ventricles    Right Ventricle:  Cavity size normal.  Thrombus not present.  Global function normal.  Left Ventricle:  Cavity size normal.  Diastolic dimension 3 cm.  Thrombus not present.  Ejection Fraction 55%.        Valves    Aortic Valve:  Annulus normal.  Stenosis not present.  Regurgitation trace.  Leaflets normal.    Mitral Valve:  Annulus normal.  Stenosis not present.  Regurgitation trace.    Tricuspid Valve:  Annulus normal.  Stenosis not present.  Regurgitation trace.      Aorta    Ascending Aorta:  Size normal.  Dissection not present.  Plaque thickness less than 3 mm.  Mobile plaque not present.  Aortic Arch:  Size normal.  Dissection not present.  Plaque thickness greater than 3 mm.  Mobile plaque not present.  Descending Aorta:  Size normal.  Dissection not present.  Plaque thickness greater than 3 mm.  Mobile plaque not present.      Atria    Right Atrium:  Size normal.  Thrombus not present.  Tumor not present.  Device present.    Left  Atrium:  Size normal.  Spontaneous echo contrast present.  Thrombus not present.  Tumor not present.  Device not present.  Left atrial appendage normal.      Septa    Atrial Septum:  Intra-atrial septal morphology lipomatous hypertrophy.    Ventricular Septum:  Intra-ventricular septum morphology hypertrophy.    Diastolic Function Measurements:  Diastolic Dysfunction Grade= I  E=  ms  A=  ms  E/A Ratio=  1.2  DT=  ms  S/D=  IVRT=    Other Findings  Pericardium:  normal  Pleural Effusion:  none  Pulmonary Arteries:  normal  Pulmonary Venous Flow:  normal    Anesthesia Information  Performed Personally  Anesthesiologist:  Olivia Patel MD      Echocardiogram Comments:  Severe concentric LV hypertrophy, appears low normal function, LVEF 50-55%.  Normal RV function.  Valves normal.    Post CPB:  S/p CABG  Hyperdynamic LV function, LVEF 65%, inferoseptal hypokinesis  Valves unchanged.  No aortic dissection post decannulation.      I have reviewed the medications:  Scheduled Meds:amiodarone, 200 mg, Oral, Q24H  aspirin, 81 mg, Oral, Daily  atorvastatin, 40 mg, Oral, Nightly  chlorhexidine, 15 mL, Mouth/Throat, Q12H  cholecalciferol, 1,000 Units, Oral, Daily  enoxaparin, 40 mg, Subcutaneous, Daily  insulin glargine, 10 Units, Subcutaneous, Daily  insulin lispro, 2-9 Units, Subcutaneous, 4x Daily AC & at Bedtime  insulin lispro, 3 Units, Subcutaneous, TID With Meals  metoprolol tartrate, 25 mg, Oral, Q12H  mupirocin, , Each Nare, BID  pantoprazole, 40 mg, Oral, QAM  senna-docusate sodium, 2 tablet, Oral, Nightly  sodium chloride, 10 mL, Intravenous, Q12H      Continuous Infusions:clevidipine, 2-32 mg/hr  dexmedetomidine, 0.2-1.5 mcg/kg/hr, Last Rate: Stopped (09/01/23 1330)  DOPamine, 2-20 mcg/kg/min  EPINEPHrine, 0.02-0.1 mcg/kg/min  milrinone, 0.25-0.375 mcg/kg/min  niCARdipine, 5-15 mg/hr, Last Rate: Stopped (09/01/23 1156)  nitroglycerin, 5-200 mcg/min  norepinephrine, 0.02-0.2 mcg/kg/min, Last Rate: Stopped  (09/01/23 1640)  phenylephrine, 0.2-2 mcg/kg/min  propofol, 5-50 mcg/kg/min, Last Rate: Stopped (09/01/23 1205)  sodium chloride, 30 mL/hr, Last Rate: 30 mL/hr (09/01/23 1120)      PRN Meds:.  acetaminophen **OR** acetaminophen **OR** acetaminophen    ALPRAZolam    aluminum-magnesium hydroxide-simethicone    bisacodyl    bisacodyl    clevidipine    cyclobenzaprine    dextrose    dextrose    DOPamine    EPINEPHrine    glucagon (human recombinant)    HYDROcodone-acetaminophen    midazolam    milrinone    Morphine **AND** naloxone    Morphine    niCARdipine    nitroglycerin    norepinephrine    ondansetron    oxyCODONE    phenylephrine    polyethylene glycol    Potassium Replacement - Follow Nurse / BPA Driven Protocol    propofol    sodium chloride    sodium chloride    Assessment & Plan   Assessment & Plan     Active Hospital Problems    Diagnosis  POA    **CAD (coronary artery disease) [I25.10]  Yes    Type 2 diabetes mellitus with hyperglycemia [E11.65]  Yes    Hypertension, essential [I10]  Yes    Elevated cholesterol [E78.00]  Yes    Diabetic polyneuropathy associated with type 2 diabetes mellitus [E11.42]  Yes    CAD, multiple vessel [I25.10]  Clinically Undetermined    Coronary atherosclerosis due to calcified coronary lesion (CODE) [I25.84]  Yes      Resolved Hospital Problems   No resolved problems to display.        Brief Hospital Course to date:  Lennie Gomez is a 68 y.o. female  presents the hospital with CAD status post CABG Hospital medicine has been consulted for diabetes management.     Discussion/plan:  Glucose reviewed.  Insulin further adjusted, basal and prandial has been increased.  Plan discussed with nursing who will contact me if there are any further blood sugar issues.  Chest x-ray images reviewed and unchanged.  Continue stroke care as outlined by neurology.     Diabetes:   Monitor glucose and adjust insulin as needed.    Stroke: Antiplatelet, statin.  History of carotid disease noted.   Neurology recommends outpatient follow-up and eventual intervention after she recovers further.     CAD: Status post CABG.  Postoperative supportive care and symptom treatment and care per primary service.  Aspirin and statin.       Essential hypertension:  Receiving postoperative pressors.  Beta-blocker.     Hyperlipidemia:  Atorvastatin     vitamin D deficiency: Started D3.  Current level is 28     Treatment plan discussed with the patient who is in agreement.      CODE STATUS:   Code Status and Medical Interventions:   Ordered at: 09/01/23 1012     Code Status (Patient has no pulse and is not breathing):    CPR (Attempt to Resuscitate)     Medical Interventions (Patient has pulse or is breathing):    Full Support       Earnest Perrin MD  09/04/23

## 2023-09-04 NOTE — PROGRESS NOTES
"DOS: 2023  NAME: Lennie Gomez   : 1955  PCP: Jacqui Patterson APRN  No chief complaint on file.      Chief complaint: Stroke  Subjective: Mental status same right arm weakness significantly improved    Objective:  Vital signs: /57 (BP Location: Right arm, Patient Position: Sitting)   Pulse 98   Temp 98 °F (36.7 °C) (Oral)   Resp 18   Ht 157.5 cm (62.01\")   Wt 71.4 kg (157 lb 8 oz)   SpO2 (!) 89%   BMI 28.80 kg/m²    Gen: NAD, vitals reviewed  MS: oriented x3, recent/remote memory intact, normal attention/concentration, language intact, no neglect.  CN: visual acuity grossly normal, PERRL, EOMI, no facial droop, no dysarthria  Motor: Right upper extremity drift, orbiting, otherwise 5/5 throughout, normal tone throughout  Sensory: intact to light touch all 4 ext.    Laboratory results:  Lab Results   Component Value Date    GLUCOSE 186 (H) 2023    CALCIUM 9.3 2023     2023    K 3.7 2023    CO2 26.0 2023    CL 99 2023    BUN 11 2023    CREATININE 0.62 2023    BCR 17.7 2023    ANIONGAP 13.0 2023     Lab Results   Component Value Date    WBC 8.66 2023    HGB 10.7 (L) 2023    HCT 32.6 (L) 2023    MCV 93.1 2023     (L) 2023     Lab Results   Component Value Date    LDL 24 2023    LDL 83 2023         Lab 23  1132   HEMOGLOBIN A1C 11.40*        Review of labs: CBC, BMP unremarkable    Diagnoses:  CAD status post CABG  Right arm weakness and apraxia  Bilateral carotid stenosis, likely symptomatic on the left    Comment: Neurologically significantly improved today.  Discussed with Dr. Aaron.  A TCAR is anticipated on the left in a few weeks.    Plan:  1.  Continue aspirin, add Plavix  2.  High-dose statin  3.  Agree with TCAR on the left when able  4.  Discussed smoking cessation with her    Management discussed with patient, sister at the bedside.  I will see her as " needed    Follow-up with me in the office in 3 months

## 2023-09-04 NOTE — NURSING NOTE
Patient's bob removed by day shift nurse right before leaving at 10 pm. Patient unable to void by 2 AM. Bladder scanned with 300 in bladder @ 2AM; patient denies urgency or feeling of needing to void. Re-scanned at 0330 and PT has 340 in bladder. Patient unable to void. Will recheck shortly.

## 2023-09-04 NOTE — THERAPY TREATMENT NOTE
Patient Name: Lennie Gomez  : 1955    MRN: [id number]                              Today's Date: 2023       Admit Date: 2023    Visit Dx: No diagnosis found.  Patient Active Problem List   Diagnosis    Preoperative clearance    Abnormal nuclear stress test    CAD, multiple vessel    Coronary atherosclerosis due to calcified coronary lesion (CODE)    CAD (coronary artery disease)    Type 2 diabetes mellitus with hyperglycemia    Hypertension, essential    Elevated cholesterol    Diabetic polyneuropathy associated with type 2 diabetes mellitus     Past Medical History:   Diagnosis Date    Arthritis     Coronary artery disease     Diabetes mellitus     Elevated cholesterol     Elevated cholesterol 2023    Hypertension     Myocardial infarction      Past Surgical History:   Procedure Laterality Date    CARDIAC CATHETERIZATION      CARDIAC CATHETERIZATION N/A 2023    Procedure: Left Heart Cath with possible coronary angioplasty;  Surgeon: Surendra Sprague MD;  Location: [facility] CATH INVASIVE LOCATION;  Service: Cardiology;  Laterality: N/A;     SECTION        General Information       Row Name 23 1030          Physical Therapy Time and Intention    Document Type therapy note (daily note)  -     Mode of Treatment individual therapy;physical therapy  -       Row Name 23 1030          General Information    Patient Profile Reviewed yes  -     Existing Precautions/Restrictions cardiac;fall;sternal  -       Row Name 23 1030          Cognition    Orientation Status (Cognition) oriented x 3  -       Row Name 23 1030          Safety Issues, Functional Mobility    Impairments Affecting Function (Mobility) balance;endurance/activity tolerance;strength;shortness of breath;pain  -               User Key  (r) = Recorded By, (t) = Taken By, (c) = Cosigned By      Initials Name Provider Type     Shakila Atkinson, PT Physical Therapist                    Mobility       Row Name 09/04/23 1030          Bed Mobility    Bed Mobility sit-supine;scooting/bridging;supine-sit  -     Scooting/Bridging [place] (Bed Mobility) moderate assist (50% patient effort);2 person assist;verbal cues  -     Supine-Sit [place] (Bed Mobility) contact guard;verbal cues  -     Sit-Supine [place] (Bed Mobility) contact guard;verbal cues  -     Assistive Device (Bed Mobility) head of bed elevated  -       Row Name 09/04/23 1030          Sit-Stand Transfer    Sit-Stand [place] (Transfers) verbal cues;minimum assist (75% patient effort)  -     Assistive Device (Sit-Stand Transfers) other (see comments)  HHA x2  -[facility] Name 09/04/23 1030          Gait/Stairs (Locomotion)    [place] Level (Gait) verbal cues;minimum assist (75% patient effort);1 person assist  -     Assistive Device (Gait) other (see comments)  HHA x1-2  -     Distance in Feet (Gait) 15ft  -     Deviations/Abnormal Patterns (Gait) antalgic;latoya decreased;gait speed decreased;stride length decreased;weight shifting decreased  -     Bilateral Gait Deviations forward flexed posture  -               User Key  (r) = Recorded By, (t) = Taken By, (c) = Cosigned By      Initials Name Provider Type    Shakila Caballero PT Physical Therapist                   Obj/Interventions       [facility] Name 09/04/23 1031          Motor Skills    Therapeutic Exercise --  5 reps cardiac rehab protocol  -               User Key  (r) = Recorded By, (t) = Taken By, (c) = Cosigned By      Initials Name Provider Type     Shakila Atkinson PT Physical Therapist                   Goals/Plan    No documentation.                  Clinical Impression       [facility] Name 09/04/23 1031          Pain    Pretreatment Pain Rating 3/10  -     Posttreatment Pain Rating 3/10  -     Pain Location generalized  -     Pain Location - chest  -     Pain Intervention(s) Repositioned;Ambulation/increased  activity;Rest  -       Row Name 09/04/23 1031          Plan of Care Review    Plan of Care Reviewed With patient  -     Progress improving  -     Outcome Evaluation Pt seen for PT this AM and demo'd improved independence overall. Pt much more alert and interactive today, coming back on transport stretcher from test. Pt transferred to EOB with CGA and stood with min A x2 and HHA. Pt ambulated 15ft  to bed with HHA x1 and min A. Pt commenting how long of a walk that was and denies further mobility, though tolerated very well with no SOA. Pt steadier on her feet with no R lateral lean noted today, still with RUE/hand coordination deficits - reports difficulty gripping. Pt assisted with transfer to supine and scooted up in bed, left with needs met. PT will continue to follow to progress mobility as tolerated. Anticipate DC to SNF vs IPR pending progress.  -       Row Name 09/04/23 1031          Vital Signs    O2 Delivery Pre Treatment supplemental O2  -     O2 Delivery Intra Treatment supplemental O2  -     O2 Delivery Post Treatment supplemental O2  -       Row Name 09/04/23 1031          Positioning and Restraints    Pre-Treatment Position in bed  -     Post Treatment Position bed  -     In Bed fowlers;call light within reach;encouraged to call for assist;exit alarm on  -               User Key  (r) = Recorded By, (t) = Taken By, (c) = Cosigned By      Initials Name Provider Type     Shakila Atkinson, PT Physical Therapist                   Outcome Measures       Row Name 09/04/23 1034          How much help from another person do you currently need...    Turning from your back to your side while in flat bed without using bedrails? 3  -BH     Moving from lying on back to sitting on the side of a flat bed without bedrails? 3  -BH     Moving to and from a bed to a chair (including a wheelchair)? 3  -BH     Standing up from a chair using your arms (e.g., wheelchair, bedside chair)? 3  -BH      Climbing 3-5 steps with a railing? 2  -     To walk in hospital room? 3  -     AM-PAC 6 Clicks Score (PT) 17  -     Highest level of mobility 5 --> Static standing  -       Row Name 09/04/23 1034          Functional Assessment    Outcome Measure Options AM-PAC 6 Clicks Basic Mobility (PT)  -               User Key  (r) = Recorded By, (t) = Taken By, (c) = Cosigned By      Initials Name Provider Type     Shakila Atkinson PT Physical Therapist                                 Physical Therapy Education       Title: PT OT SLP Therapies (Done)       Topic: Physical Therapy (Done)       Point: Mobility training (Done)       Learning Progress Summary             Patient Acceptance, TB,D,E, VU,NR by  at 9/4/2023 1034    Acceptance, E,TB,D, VU,NR by  at 9/3/2023 1527    Acceptance, E,TB,D, VU,NR by  at 9/2/2023 1344                         Point: Home exercise program (Done)       Learning Progress Summary             Patient Acceptance, TB,D,E, VU,NR by  at 9/4/2023 1034    Acceptance, E,TB,D, VU,NR by  at 9/3/2023 1527    Acceptance, E,TB,D, VU,NR by  at 9/2/2023 1344                         Point: Body mechanics (Done)       Learning Progress Summary             Patient Acceptance, TB,D,E, VU,NR by  at 9/4/2023 1034    Acceptance, E,TB,D, VU,NR by  at 9/3/2023 1527    Acceptance, E,TB,D, VU,NR by  at 9/2/2023 1344                         Point: Precautions (Done)       Learning Progress Summary             Patient Acceptance, TB,D,E, VU,NR by  at 9/4/2023 1034    Acceptance, E,TB,D, VU,NR by  at 9/3/2023 1527    Acceptance, E,TB,D, VU,NR by  at 9/2/2023 1344                                         User Key       Initials Effective Dates Name Provider Type [facility] 04/08/22 -  Shakila Atkinson PT Physical Therapist PT                  PT Recommendation and Plan  Planned Therapy Interventions (PT): bed mobility training, balance training, gait training, home exercise program,  patient/family education, strengthening, stair training, ROM (range of motion), transfer training  Plan of Care Reviewed With: patient  Progress: improving  Outcome Evaluation: Pt seen for PT this AM and demo'd improved independence overall. Pt much more alert and interactive today, coming back on transport stretcher from test. Pt transferred to EOB with CGA and stood with min A x2 and HHA. Pt ambulated 15ft  to bed with HHA x1 and min A. Pt commenting how long of a walk that was and denies further mobility, though tolerated very well with no SOA. Pt steadier on her feet with no R lateral lean noted today, still with RUE/hand coordination deficits - reports difficulty gripping. Pt assisted with transfer to supine and scooted up in bed, left with needs met. PT will continue to follow to progress mobility as tolerated. Anticipate DC to SNF vs IPR pending progress.     Time Calculation:   PT Evaluation Complexity  History, PT Evaluation Complexity: 1-2 personal factors and/or comorbidities  Examination of Body Systems (PT Eval Complexity): total of 3 or more elements  Clinical Presentation (PT Evaluation Complexity): evolving  Clinical Decision Making (PT Evaluation Complexity): moderate complexity  Overall Complexity (PT Evaluation Complexity): moderate complexity     PT Charges       Row Name 09/04/23 1034             Time Calculation    Start Time 0905  -      Stop Time 0917  -      Time Calculation (min) 12 min  -      PT Received On 09/04/23  -      PT - Next Appointment 09/05/23  -         Time Calculation- PT    Total Timed Code Minutes- PT 12 minute(s)  -         Timed Charges    28499 - Gait Training Minutes  8  -      89732 - PT Therapeutic Activity Minutes 4  -         Total Minutes    Timed Charges Total Minutes 12  -       Total Minutes 12  -                User Key  (r) = Recorded By, (t) = Taken By, (c) = Cosigned By      Initials Name Provider Type    BH Shakila Atkinson, PT Physical  Therapist                  Therapy Charges for Today       Code Description Service Date Service Provider Modifiers Qty    32652282994  GAIT TRAINING EA 15 MIN 9/3/2023 Shakila Atkinson, PT GP 1    [id number]  PT THERAPEUTIC ACT EA 15 MIN 9/3/2023 Shakila Atkinson, PT GP 1    [id number]  PT THER SUPP EA 15 MIN 9/3/2023 Shakila Atkinson, PT GP 1    [id number] HC GAIT TRAINING EA 15 MIN 9/4/2023 Shakila Atkinson, PT GP 1    [id number]  PT THER SUPP EA 15 MIN 9/4/2023 Shakila Atkinson, PT GP 1            PT G-Codes  Outcome Measure Options: AM-PAC 6 Clicks Basic Mobility (PT)  AM-PAC 6 Clicks Score (PT): 17  PT Discharge Summary  Anticipated Discharge Disposition (PT): skilled nursing facility, inpatient rehabilitation facility    Shakila Atkinson, PT  9/4/2023

## 2023-09-04 NOTE — PLAN OF CARE
Goal Outcome Evaluation:  Plan of Care Reviewed With: patient        Progress: improving  Outcome Evaluation: VSS. Coughing frequently, larg amount of tan sputum noted, SATs on 4LNC 92-95%. Oriented x 4, Denies pain. Monitor urine output, urinary retention noted after voiding, back up to bedside commode after bladder scan and voided another 225cc. Scheduled voiding needed.

## 2023-09-04 NOTE — PLAN OF CARE
Goal Outcome Evaluation:  Plan of Care Reviewed With: patient        Progress: improving  Outcome Evaluation: Pt seen for PT this AM and demo'd improved independence overall. Pt much more alert and interactive today, coming back on transport stretcher from test. Pt transferred to EOB with CGA and stood with min A x2 and HHA. Pt ambulated 15ft  to bed with HHA x1 and min A. Pt commenting how long of a walk that was and denies further mobility, though tolerated very well with no SOA. Pt steadier on her feet with no R lateral lean noted today, still with RUE/hand coordination deficits - reports difficulty gripping. Pt assisted with transfer to supine and scooted up in bed, left with needs met. PT will continue to follow to progress mobility as tolerated. Anticipate DC to SNF vs IPR pending progress.      Anticipated Discharge Disposition (PT): skilled nursing facility, inpatient rehabilitation facility

## 2023-09-04 NOTE — PROGRESS NOTES
[facility] Acute Inpt Rehab Note     Referral received via stroke order set.  Please note this is a screening only, rehab admissions will not actively be evaluating this patient.  If felt patient is appropriate for our services once therapies begin, please call our office at [phone], to initiate a full referral.    Thank you,   Mis Duran RN   Rehab Admission Nurse

## 2023-09-04 NOTE — PLAN OF CARE
Goal Outcome Evaluation:                 SLP orders generated via stroke order set. Pt has passed swallow screen, placed on regular diet per MD orders. Skilled bedside swallow evaluation deferred at this time.     No initial focal complaints of speech, language, or oral motor difficulties. MRI and further imaging pending.     SLP will follow peripherally pending clinical course to determine if speech language evaluation is indicated.

## 2023-09-05 LAB
ANION GAP SERPL CALCULATED.3IONS-SCNC: 9.8 MMOL/L (ref 5–15)
BUN SERPL-MCNC: 10 MG/DL (ref 8–23)
BUN/CREAT SERPL: 17.2 (ref 7–25)
CALCIUM SPEC-SCNC: 8.8 MG/DL (ref 8.6–10.5)
CHLORIDE SERPL-SCNC: 97 MMOL/L (ref 98–107)
CO2 SERPL-SCNC: 27.2 MMOL/L (ref 22–29)
CREAT SERPL-MCNC: 0.58 MG/DL (ref 0.57–1)
DEPRECATED RDW RBC AUTO: 43.7 FL (ref 37–54)
EGFRCR SERPLBLD CKD-EPI 2021: 99.3 ML/MIN/1.73
ERYTHROCYTE [DISTWIDTH] IN BLOOD BY AUTOMATED COUNT: 12.5 % (ref 12.3–15.4)
GLUCOSE BLDC GLUCOMTR-MCNC: 183 MG/DL (ref 70–130)
GLUCOSE BLDC GLUCOMTR-MCNC: 202 MG/DL (ref 70–130)
GLUCOSE BLDC GLUCOMTR-MCNC: 215 MG/DL (ref 70–130)
GLUCOSE BLDC GLUCOMTR-MCNC: 217 MG/DL (ref 70–130)
GLUCOSE SERPL-MCNC: 215 MG/DL (ref 65–99)
HCT VFR BLD AUTO: 31.6 % (ref 34–46.6)
HGB BLD-MCNC: 10.2 G/DL (ref 12–15.9)
MCH RBC QN AUTO: 30.7 PG (ref 26.6–33)
MCHC RBC AUTO-ENTMCNC: 32.3 G/DL (ref 31.5–35.7)
MCV RBC AUTO: 95.2 FL (ref 79–97)
PLATELET # BLD AUTO: 155 10*3/MM3 (ref 140–450)
PMV BLD AUTO: 11.5 FL (ref 6–12)
POTASSIUM SERPL-SCNC: 3.2 MMOL/L (ref 3.5–5.2)
POTASSIUM SERPL-SCNC: 3.7 MMOL/L (ref 3.5–5.2)
RBC # BLD AUTO: 3.32 10*6/MM3 (ref 3.77–5.28)
SODIUM SERPL-SCNC: 134 MMOL/L (ref 136–145)
WBC NRBC COR # BLD: 7.29 10*3/MM3 (ref 3.4–10.8)

## 2023-09-05 PROCEDURE — 94799 UNLISTED PULMONARY SVC/PX: CPT

## 2023-09-05 PROCEDURE — 82948 REAGENT STRIP/BLOOD GLUCOSE: CPT

## 2023-09-05 PROCEDURE — 97535 SELF CARE MNGMENT TRAINING: CPT

## 2023-09-05 PROCEDURE — 63710000001 INSULIN GLARGINE PER 5 UNITS: Performed by: NURSE PRACTITIONER

## 2023-09-05 PROCEDURE — 97166 OT EVAL MOD COMPLEX 45 MIN: CPT

## 2023-09-05 PROCEDURE — 25010000002 FUROSEMIDE PER 20 MG: Performed by: NURSE PRACTITIONER

## 2023-09-05 PROCEDURE — 85027 COMPLETE CBC AUTOMATED: CPT | Performed by: NURSE PRACTITIONER

## 2023-09-05 PROCEDURE — 80048 BASIC METABOLIC PNL TOTAL CA: CPT | Performed by: NURSE PRACTITIONER

## 2023-09-05 PROCEDURE — 25010000002 ENOXAPARIN PER 10 MG: Performed by: NURSE PRACTITIONER

## 2023-09-05 PROCEDURE — 97110 THERAPEUTIC EXERCISES: CPT

## 2023-09-05 PROCEDURE — 63710000001 INSULIN LISPRO (HUMAN) PER 5 UNITS: Performed by: INTERNAL MEDICINE

## 2023-09-05 PROCEDURE — 99024 POSTOP FOLLOW-UP VISIT: CPT | Performed by: THORACIC SURGERY (CARDIOTHORACIC VASCULAR SURGERY)

## 2023-09-05 PROCEDURE — 94761 N-INVAS EAR/PLS OXIMETRY MLT: CPT

## 2023-09-05 PROCEDURE — 84132 ASSAY OF SERUM POTASSIUM: CPT | Performed by: THORACIC SURGERY (CARDIOTHORACIC VASCULAR SURGERY)

## 2023-09-05 RX ORDER — POTASSIUM CHLORIDE 750 MG/1
20 TABLET, FILM COATED, EXTENDED RELEASE ORAL DAILY
Status: DISCONTINUED | OUTPATIENT
Start: 2023-09-05 | End: 2023-09-07

## 2023-09-05 RX ORDER — INSULIN LISPRO 100 [IU]/ML
7 INJECTION, SOLUTION INTRAVENOUS; SUBCUTANEOUS
Status: DISCONTINUED | OUTPATIENT
Start: 2023-09-05 | End: 2023-09-08 | Stop reason: HOSPADM

## 2023-09-05 RX ORDER — POLYETHYLENE GLYCOL 3350 17 G/17G
17 POWDER, FOR SOLUTION ORAL DAILY
Status: DISCONTINUED | OUTPATIENT
Start: 2023-09-05 | End: 2023-09-08 | Stop reason: HOSPADM

## 2023-09-05 RX ORDER — GUAIFENESIN 600 MG/1
1200 TABLET, EXTENDED RELEASE ORAL EVERY 12 HOURS SCHEDULED
Status: DISCONTINUED | OUTPATIENT
Start: 2023-09-05 | End: 2023-09-08 | Stop reason: HOSPADM

## 2023-09-05 RX ORDER — POTASSIUM CHLORIDE 750 MG/1
40 TABLET, FILM COATED, EXTENDED RELEASE ORAL EVERY 4 HOURS
Status: COMPLETED | OUTPATIENT
Start: 2023-09-05 | End: 2023-09-05

## 2023-09-05 RX ORDER — NICOTINE POLACRILEX 4 MG
15 LOZENGE BUCCAL
Status: DISCONTINUED | OUTPATIENT
Start: 2023-09-05 | End: 2023-09-08 | Stop reason: HOSPADM

## 2023-09-05 RX ORDER — INSULIN LISPRO 100 [IU]/ML
5 INJECTION, SOLUTION INTRAVENOUS; SUBCUTANEOUS
Status: DISCONTINUED | OUTPATIENT
Start: 2023-09-05 | End: 2023-09-05

## 2023-09-05 RX ORDER — FUROSEMIDE 40 MG/1
40 TABLET ORAL DAILY
Status: DISCONTINUED | OUTPATIENT
Start: 2023-09-06 | End: 2023-09-07

## 2023-09-05 RX ORDER — DOCUSATE SODIUM 100 MG/1
100 CAPSULE, LIQUID FILLED ORAL 2 TIMES DAILY
Status: DISCONTINUED | OUTPATIENT
Start: 2023-09-05 | End: 2023-09-08 | Stop reason: HOSPADM

## 2023-09-05 RX ORDER — FUROSEMIDE 10 MG/ML
40 INJECTION INTRAMUSCULAR; INTRAVENOUS ONCE
Status: COMPLETED | OUTPATIENT
Start: 2023-09-05 | End: 2023-09-05

## 2023-09-05 RX ORDER — INSULIN LISPRO 100 [IU]/ML
3-14 INJECTION, SOLUTION INTRAVENOUS; SUBCUTANEOUS
Status: DISCONTINUED | OUTPATIENT
Start: 2023-09-05 | End: 2023-09-08 | Stop reason: HOSPADM

## 2023-09-05 RX ORDER — DEXTROSE MONOHYDRATE 25 G/50ML
25 INJECTION, SOLUTION INTRAVENOUS
Status: DISCONTINUED | OUTPATIENT
Start: 2023-09-05 | End: 2023-09-08 | Stop reason: HOSPADM

## 2023-09-05 RX ADMIN — ASPIRIN 81 MG: 81 TABLET, COATED ORAL at 09:57

## 2023-09-05 RX ADMIN — FUROSEMIDE 40 MG: 10 INJECTION, SOLUTION INTRAMUSCULAR; INTRAVENOUS at 09:48

## 2023-09-05 RX ADMIN — INSULIN LISPRO 5 UNITS: 100 INJECTION, SOLUTION INTRAVENOUS; SUBCUTANEOUS at 17:39

## 2023-09-05 RX ADMIN — METOPROLOL TARTRATE 25 MG: 25 TABLET, FILM COATED ORAL at 09:42

## 2023-09-05 RX ADMIN — INSULIN LISPRO 3 UNITS: 100 INJECTION, SOLUTION INTRAVENOUS; SUBCUTANEOUS at 21:49

## 2023-09-05 RX ADMIN — Medication 10 ML: at 21:51

## 2023-09-05 RX ADMIN — INSULIN LISPRO 4 UNITS: 100 INJECTION, SOLUTION INTRAVENOUS; SUBCUTANEOUS at 12:45

## 2023-09-05 RX ADMIN — CLOPIDOGREL BISULFATE 75 MG: 75 TABLET, FILM COATED ORAL at 12:45

## 2023-09-05 RX ADMIN — ENOXAPARIN SODIUM 40 MG: 100 INJECTION SUBCUTANEOUS at 17:38

## 2023-09-05 RX ADMIN — INSULIN LISPRO 7 UNITS: 100 INJECTION, SOLUTION INTRAVENOUS; SUBCUTANEOUS at 17:39

## 2023-09-05 RX ADMIN — MUPIROCIN 1 APPLICATION: 20 OINTMENT TOPICAL at 21:49

## 2023-09-05 RX ADMIN — METOPROLOL TARTRATE 25 MG: 25 TABLET, FILM COATED ORAL at 21:49

## 2023-09-05 RX ADMIN — INSULIN LISPRO 4 UNITS: 100 INJECTION, SOLUTION INTRAVENOUS; SUBCUTANEOUS at 06:55

## 2023-09-05 RX ADMIN — GUAIFENESIN 1200 MG: 600 TABLET, EXTENDED RELEASE ORAL at 09:43

## 2023-09-05 RX ADMIN — PANTOPRAZOLE SODIUM 40 MG: 40 TABLET, DELAYED RELEASE ORAL at 06:00

## 2023-09-05 RX ADMIN — POTASSIUM CHLORIDE 40 MEQ: 750 TABLET, EXTENDED RELEASE ORAL at 05:58

## 2023-09-05 RX ADMIN — POLYETHYLENE GLYCOL 3350 17 G: 17 POWDER, FOR SOLUTION ORAL at 17:38

## 2023-09-05 RX ADMIN — Medication 1000 UNITS: at 09:43

## 2023-09-05 RX ADMIN — DOCUSATE SODIUM 100 MG: 100 CAPSULE, LIQUID FILLED ORAL at 09:42

## 2023-09-05 RX ADMIN — INSULIN GLARGINE 20 UNITS: 100 INJECTION, SOLUTION SUBCUTANEOUS at 09:57

## 2023-09-05 RX ADMIN — GUAIFENESIN 1200 MG: 600 TABLET, EXTENDED RELEASE ORAL at 21:49

## 2023-09-05 RX ADMIN — INSULIN LISPRO 5 UNITS: 100 INJECTION, SOLUTION INTRAVENOUS; SUBCUTANEOUS at 09:43

## 2023-09-05 RX ADMIN — MUPIROCIN 1 APPLICATION: 20 OINTMENT TOPICAL at 09:47

## 2023-09-05 RX ADMIN — POTASSIUM CHLORIDE 40 MEQ: 750 TABLET, EXTENDED RELEASE ORAL at 09:43

## 2023-09-05 RX ADMIN — Medication 10 ML: at 09:48

## 2023-09-05 RX ADMIN — AMIODARONE HYDROCHLORIDE 200 MG: 200 TABLET ORAL at 09:42

## 2023-09-05 RX ADMIN — ATORVASTATIN CALCIUM 40 MG: 20 TABLET, FILM COATED ORAL at 21:49

## 2023-09-05 NOTE — PLAN OF CARE
Goal Outcome Evaluation:                      Patient  CABG  P/O  day 4, vital signs stable, oxygen at 4 liter NC, SR on the monitor, pt encourage to get up ,educate to call for assist, call light in reach cont to monitor.

## 2023-09-05 NOTE — PROGRESS NOTES
" LOS: 5 days   Patient Care Team:  Jacqui Patterson APRN as PCP - General (Nurse Practitioner)    Chief Complaint: post op    Subjective      Vital Signs  Temp:  [98.2 °F (36.8 °C)-99.8 °F (37.7 °C)] 98.6 °F (37 °C)  Heart Rate:  [] 84  Resp:  [16-17] 17  BP: (122-136)/(49-67) 129/67  Body mass index is 28.74 kg/m².    Intake/Output Summary (Last 24 hours) at 9/5/2023 0836  Last data filed at 9/5/2023 0743  Gross per 24 hour   Intake 700 ml   Output 1295 ml   Net -595 ml     I/O this shift:  In: -   Out: 400 [Urine:400]    Chest tube drainage last 8 hours         09/03/23  0540 09/04/23  0550 09/05/23  0633   Weight: 70.3 kg (154 lb 15.7 oz) 71.4 kg (157 lb 8 oz) 71.3 kg (157 lb 3.2 oz)         Objective:  Vital signs: (most recent): Blood pressure 129/67, pulse 84, temperature 98.6 °F (37 °C), temperature source Oral, resp. rate 17, height 157.5 cm (62.01\"), weight 71.3 kg (157 lb 3.2 oz), SpO2 (!) 89 %.              Results Review:        WBC WBC   Date Value Ref Range Status   09/05/2023 7.29 3.40 - 10.80 10*3/mm3 Final   09/04/2023 8.66 3.40 - 10.80 10*3/mm3 Final   09/03/2023 8.52 3.40 - 10.80 10*3/mm3 Final      HGB Hemoglobin   Date Value Ref Range Status   09/05/2023 10.2 (L) 12.0 - 15.9 g/dL Final   09/04/2023 10.7 (L) 12.0 - 15.9 g/dL Final   09/03/2023 9.8 (L) 12.0 - 15.9 g/dL Final      HCT Hematocrit   Date Value Ref Range Status   09/05/2023 31.6 (L) 34.0 - 46.6 % Final   09/04/2023 32.6 (L) 34.0 - 46.6 % Final   09/03/2023 29.6 (L) 34.0 - 46.6 % Final      Platelets Platelets   Date Value Ref Range Status   09/05/2023 155 140 - 450 10*3/mm3 Final   09/04/2023 128 (L) 140 - 450 10*3/mm3 Final   09/03/2023 104 (L) 140 - 450 10*3/mm3 Final        PT/INR:  No results found for: PROTIME/No results found for: INR    Sodium Sodium   Date Value Ref Range Status   09/05/2023 134 (L) 136 - 145 mmol/L Final   09/04/2023 138 136 - 145 mmol/L Final   09/03/2023 137 136 - 145 mmol/L Final      Potassium " Potassium   Date Value Ref Range Status   09/05/2023 3.2 (L) 3.5 - 5.2 mmol/L Final   09/04/2023 3.7 3.5 - 5.2 mmol/L Final   09/03/2023 4.0 3.5 - 5.2 mmol/L Final     Comment:     Slight hemolysis detected by analyzer. Results may be affected.      Chloride Chloride   Date Value Ref Range Status   09/05/2023 97 (L) 98 - 107 mmol/L Final   09/04/2023 99 98 - 107 mmol/L Final   09/03/2023 105 98 - 107 mmol/L Final      Bicarbonate CO2   Date Value Ref Range Status   09/05/2023 27.2 22.0 - 29.0 mmol/L Final   09/04/2023 26.0 22.0 - 29.0 mmol/L Final   09/03/2023 22.8 22.0 - 29.0 mmol/L Final      BUN BUN   Date Value Ref Range Status   09/05/2023 10 8 - 23 mg/dL Final   09/04/2023 11 8 - 23 mg/dL Final   09/03/2023 9 8 - 23 mg/dL Final      Creatinine Creatinine   Date Value Ref Range Status   09/05/2023 0.58 0.57 - 1.00 mg/dL Final   09/04/2023 0.62 0.57 - 1.00 mg/dL Final   09/03/2023 0.60 0.57 - 1.00 mg/dL Final      Calcium Calcium   Date Value Ref Range Status   09/05/2023 8.8 8.6 - 10.5 mg/dL Final   09/04/2023 9.3 8.6 - 10.5 mg/dL Final   09/03/2023 8.7 8.6 - 10.5 mg/dL Final      Magnesium No results found for: MG       amiodarone, 200 mg, Oral, Q24H  aspirin, 81 mg, Oral, Daily  atorvastatin, 40 mg, Oral, Nightly  cholecalciferol, 1,000 Units, Oral, Daily  clopidogrel, 75 mg, Oral, Daily  enoxaparin, 40 mg, Subcutaneous, Daily  insulin glargine, 14 Units, Subcutaneous, Daily  insulin lispro, 2-9 Units, Subcutaneous, 4x Daily AC & at Bedtime  insulin lispro, 5 Units, Subcutaneous, TID With Meals  metoprolol tartrate, 25 mg, Oral, Q12H  mupirocin, , Each Nare, BID  pantoprazole, 40 mg, Oral, QAM  potassium chloride ER, 40 mEq, Oral, Q4H  senna-docusate sodium, 2 tablet, Oral, Nightly  sodium chloride, 10 mL, Intravenous, Q12H      clevidipine, 2-32 mg/hr  dexmedetomidine, 0.2-1.5 mcg/kg/hr, Last Rate: Stopped (09/01/23 1330)  DOPamine, 2-20 mcg/kg/min  EPINEPHrine, 0.02-0.1 mcg/kg/min  milrinone, 0.25-0.375  mcg/kg/min  niCARdipine, 5-15 mg/hr, Last Rate: Stopped (09/01/23 1156)  nitroglycerin, 5-200 mcg/min  norepinephrine, 0.02-0.2 mcg/kg/min, Last Rate: Stopped (09/01/23 1640)  phenylephrine, 0.2-2 mcg/kg/min  propofol, 5-50 mcg/kg/min, Last Rate: Stopped (09/01/23 1205)  sodium chloride, 30 mL/hr, Last Rate: 30 mL/hr (09/01/23 1120)              CAD (coronary artery disease)    CAD, multiple vessel    Coronary atherosclerosis due to calcified coronary lesion (CODE)    Type 2 diabetes mellitus with hyperglycemia    Hypertension, essential    Elevated cholesterol    Diabetic polyneuropathy associated with type 2 diabetes mellitus      Assessment & Plan    - severe multi-vessel CAD -s/p CABGx4 LIMA/RSVG- POD#4  - ICM--EF 42%; GDMT as tolerated  - hypertension  - hyperlipidemia--statin therapy  - DM II  - severe bilateral carotid stenosis; LD Plavix  8/22  - current tobacco use    On 4L NC-- wean as able  Weight up 7 lbs from preop -- no diuresis ordered  Will give IV dose today  Aggressive pulmonary toilet-- add mucinex and flutter valve  Increase activity-- continue PT/OT--only walking 15 ft-- will consult inpatient rehab  Discontinue AV wires  Routine care       HERVE Carreon  09/05/23  08:36 EDT

## 2023-09-05 NOTE — PLAN OF CARE
The pt was admitted to [facility] 2/2 to CAD, DM2, HTN and to tobacco use. She is POD 4 from a severe multi-vessel CAD -s/p CABGx4 LIMA/RSVG. She lives with two dependent (12 and 2 yo) grandchildren who is she raising. She completed bed mobility with PT. She stood with CGA/Min A x2 and mobilized into the bathroom with CGA. Low commode and CGA/Min A provided for safety. She was able to mobilize into the hallway with CGA. O2 >93% on 3L. R hand impaired coordination noticed during ADL's at times - she voiced the pulse ox reader causing this deficit. OT switched reader onto her L hand and will continue to evaluate. She is motivated to improve and plans to d/c home with HH. Encouraged her to continue 3-4x mobilizing per day as well as sitting up as much as she can tolerate. Her daughter plans to stay with her when d/c home.

## 2023-09-05 NOTE — THERAPY TREATMENT NOTE
Patient Name: Lennie Gomez  : 1955    MRN: [id number]                              Today's Date: 2023       Admit Date: 2023    Visit Dx:     ICD-10-CM ICD-9-CM   1. S/P CABG (coronary artery bypass graft)  Z95.1 V45.81     Patient Active Problem List   Diagnosis    Preoperative clearance    Abnormal nuclear stress test    CAD, multiple vessel    Coronary atherosclerosis due to calcified coronary lesion (CODE)    CAD (coronary artery disease)    Type 2 diabetes mellitus with hyperglycemia    Hypertension, essential    Elevated cholesterol    Diabetic polyneuropathy associated with type 2 diabetes mellitus     Past Medical History:   Diagnosis Date    Arthritis     Coronary artery disease     Diabetes mellitus     Elevated cholesterol     Elevated cholesterol 2023    Hypertension     Myocardial infarction      Past Surgical History:   Procedure Laterality Date    CARDIAC CATHETERIZATION      CARDIAC CATHETERIZATION N/A 2023    Procedure: Left Heart Cath with possible coronary angioplasty;  Surgeon: Surendra Sprague MD;  Location: [facility] CATH INVASIVE LOCATION;  Service: Cardiology;  Laterality: N/A;     SECTION      CORONARY ARTERY BYPASS GRAFT N/A 2023    Procedure: SCOTT STERNOTOMY CORONARY ARTERY BYPASS GRAFT TIMES 4 USING LEFT INTERNAL MAMMARY ARTERY AND RIGHT GREATER SAPHENOUS VEIN GRAFT PER ENDOSCOPIC VEIN HARVESTING AND PRP;  Surgeon: Damir Aaron MD;  Location: [facility];  Service: Cardiothoracic;  Laterality: N/A;      General Information       Row Name 23 1421          Physical Therapy Time and Intention    Document Type therapy note (daily note)  -EJ     Mode of Treatment physical therapy  -EJ       Row Name 23 142          General Information    Existing Precautions/Restrictions cardiac;fall;sternal  -EJ               User Key  (r) = Recorded By, (t) = Taken By, (c) = Cosigned By      Initials Name Provider Type    EJ  Yuli Goode, PT Physical Therapist                   Mobility       Row Name 09/05/23 1421          Bed Mobility    Supine-Sit Stephenson (Bed Mobility) verbal cues;contact guard;minimum assist (75% patient effort)  -EJ     Sit-Supine Stephenson (Bed Mobility) verbal cues;contact guard;minimum assist (75% patient effort)  -EJ     Assistive Device (Bed Mobility) head of bed elevated  -       Row Name 09/05/23 1421          Sit-Stand Transfer    Sit-Stand Stephenson (Transfers) verbal cues;contact guard;minimum assist (75% patient effort)  -EJ     Assistive Device (Sit-Stand Transfers) walker, front-wheeled  -EJ       Row Name 09/05/23 1421          Gait/Stairs (Locomotion)    Stephenson Level (Gait) verbal cues;contact guard  -EJ     Assistive Device (Gait) --  HHA  -EJ     Distance in Feet (Gait) 40  -EJ     Deviations/Abnormal Patterns (Gait) latoya decreased;stride length decreased  -EJ     Bilateral Gait Deviations forward flexed posture  -EJ     Comment, (Gait/Stairs) slow pace, but steady, no LOB noted; able to ambulate into hallway and then to BR and then back to bed  -EJ               User Key  (r) = Recorded By, (t) = Taken By, (c) = Cosigned By      Initials Name Provider Type    EJ Yuli Goode, PT Physical Therapist                   Obj/Interventions    No documentation.                  Goals/Plan    No documentation.                  Clinical Impression       Row Name 09/05/23 1422          Pain    Pretreatment Pain Rating 0/10 - no pain  -       Row Name 09/05/23 1422          Plan of Care Review    Plan of Care Reviewed With patient  -EJ     Outcome Evaluation Pt agreeable to PT this afternoon. She she is tired, but doing well. Pt showing progress w activity today. She was able to perform bed mobility w CGA/min . She also stood w CGA/min A w slightly more assist required coming to stand from low height commode. She ambulated approx 40 ft w HHA. She does exhibit slow pace,  but is steady. Pt hopeful to return home at [place] and reports her daughter is able to stay with her. Pt back in bed at end of session w all needs in reach. Will continue to progress activity as tolerated.  -EJ       Row Name 09/05/23 1422          Positioning and Restraints    Pre-Treatment Position in bed  -EJ     Post Treatment Position bed  -EJ     In Bed notified nsg;supine;call light within reach;encouraged to call for assist;exit alarm on  -EJ               User Key  (r) = Recorded By, (t) = Taken By, (c) = Cosigned By      Initials Name Provider Type    Yuli Lucas, PT Physical Therapist                   Outcome Measures       Row Name 09/05/23 1427          How much help from another person do you currently need...    Turning from your back to your side while in flat bed without using bedrails? 3  -EJ     Moving from lying on back to sitting on the side of a flat bed without bedrails? 3  -EJ     Moving to and from a bed to a chair (including a wheelchair)? 3  -EJ     Standing up from a chair using your arms (e.g., wheelchair, bedside chair)? 3  -EJ     Climbing 3-5 steps with a railing? 3  -EJ     To walk in hospital room? 3  -EJ     AM-PAC 6 Clicks Score (PT) 18  -EJ     Highest level of mobility 6 --> Walked 10 steps or more  -EJ               User Key  (r) = Recorded By, (t) = Taken By, (c) = Cosigned By      Initials Name Provider Type    Yuli Lucas, PT Physical Therapist                                 Physical Therapy Education       Title: PT OT SLP Therapies (Done)       Topic: Physical Therapy (Done)       Point: Mobility training (Done)       Learning Progress Summary             Patient Acceptance, TB,D,E, VU,NR by  at 9/4/2023 1034    Acceptance, E,TB,D, VU,NR by  at 9/3/2023 1527    Acceptance, E,TB,D, VU,NR by  at 9/2/2023 1344                         Point: Home exercise program (Done)       Learning Progress Summary             Patient Acceptance, TB,D,E, VU,NR by   at 9/4/2023 1034    Acceptance, E,TB,D, VU,NR by  at 9/3/2023 1527    Acceptance, E,TB,D, VU,NR by  at 9/2/2023 1344                         Point: Body mechanics (Done)       Learning Progress Summary             Patient Acceptance, TB,D,E, VU,NR by  at 9/4/2023 1034    Acceptance, E,TB,D, VU,NR by  at 9/3/2023 1527    Acceptance, E,TB,D, VU,NR by  at 9/2/2023 1344                         Point: Precautions (Done)       Learning Progress Summary             Patient Acceptance, TB,D,E, VU,NR by  at 9/4/2023 1034    Acceptance, E,TB,D, VU,NR by  at 9/3/2023 1527    Acceptance, E,TB,D, VU,NR by  at 9/2/2023 1344                                         User Key       Initials Effective Dates Name Provider Type [facility] 04/08/22 -  Shakila Atkinson, PT Physical Therapist PT                  PT Recommendation and Plan     Plan of Care Reviewed With: patient  Outcome Evaluation: Pt agreeable to PT this afternoon. She she is tired, but doing well. Pt showing progress w activity today. She was able to perform bed mobility w CGA/min . She also stood w CGA/min A w slightly more assist required coming to stand from low height commode. She ambulated approx 40 ft w HHA. She does exhibit slow pace, but is steady. Pt hopeful to return home at [place] and reports her daughter is able to stay with her. Pt back in bed at end of session w all needs in reach. Will continue to progress activity as tolerated.     Time Calculation:         PT Charges       Row Name 09/05/23 1427             Time Calculation    Start Time 1333  -EJ      Stop Time 1350  -EJ      Time Calculation (min) 17 min  -EJ      PT Received On 09/05/23  -EJ      PT - Next Appointment 09/06/23  -EJ                User Key  (r) = Recorded By, (t) = Taken By, (c) = Cosigned By      Initials Name Provider Type     Yuli Goode, PT Physical Therapist                  Therapy Charges for Today       Code Description Service Date Service Provider  Modifiers Qty    [id number] HC PT THER PROC EA 15 MIN 9/5/2023 Yuli Goode, PT GP 1            PT G-Codes  Outcome Measure Options: AM-PAC 6 Clicks Basic Mobility (PT)  AM-PAC 6 Clicks Score (PT): 18  PT Discharge Summary  Anticipated Discharge Disposition (PT): home with assist, home with home health    Yuli Goode, PT  9/5/2023

## 2023-09-05 NOTE — PLAN OF CARE
Goal Outcome Evaluation:  Plan of Care Reviewed With: patient        Progress: improving  Outcome Evaluation: AV wires were d/c. No BM since surgery. NIH was a 2. Ambulating better. 2-4 L nasal cannula. Potassium replaced.

## 2023-09-05 NOTE — PROGRESS NOTES
[facility]  PROGRESS NOTE    Patient Name: Lennie Gomez  : 1955  MRN: [id number]    Date of Admission: 2023  Primary Care Physician: Jacqui Patterson APRN    Subjective   Subjective     CC:  Follow-up medical and diabetes management consult post CABG    Subjective:  No new complaints    Review of Systems  No current fevers or chills  No current shortness of breath or cough  No current nausea, vomiting, or diarrhea  No current chest pain or palpitations      Objective   Objective     Vital Signs:   Temp:  [98.2 °F (36.8 °C)-99.8 °F (37.7 °C)] 98.6 °F (37 °C)  Heart Rate:  [] 84  Resp:  [16-17] 17  BP: (122-136)/(49-67) 129/67  Total (NIH Stroke Scale): 1     Physical Exam:  Constitutional:Awake, alert  HENT: NCAT, mucous membranes moist, neck supple  Respiratory: No cough or wheezing, nonlabored respirations currently on supplemental oxygen  Cardiovascular: Normal pulse rate palpable radial pulses bilaterally  Gastrointestinal: soft, nontender, nondistended  Musculoskeletal: Postoperative sternotomy changes, BMI is 27, minimal bilateral ankle edema, no clubbing or cyanosis to extremities  Psychiatric: Calm affect, cooperative  Neurologic: Oriented, strength symmetric in all extremities, Cranial Nerves grossly intact to confrontation, speech clear  Skin: No rashes or jaundice         Results Reviewed:  Results from last 7 days   Lab Units 23  0309 23  0319 23  0356 23  0257 23  1347 23  1026 23  0712 23  1132   WBC 10*3/mm3 7.29 8.66 8.52 7.39   < > 5.98  --  6.91   HEMOGLOBIN g/dL 10.2* 10.7* 9.8* 9.9*   < > 9.9*  --  13.6   HEMOGLOBIN, POC   --   --   --   --   --   --    < >  --    HEMATOCRIT % 31.6* 32.6* 29.6* 30.4*   < > 29.9*  --  41.5   HEMATOCRIT POC   --   --   --   --   --   --    < >  --    PLATELETS 10*3/mm3 155 128* 104* 104*   < > 109*  --  160   INR   --   --   --  1.21*  --  1.32*  --  1.03    < > =  values in this interval not displayed.       Results from last 7 days   Lab Units 09/05/23  0309 09/04/23  0319 09/03/23  0356 09/01/23  0319 08/31/23  1132   SODIUM mmol/L 134* 138 137   < > 138   POTASSIUM mmol/L 3.2* 3.7 4.0   < > 3.8   CHLORIDE mmol/L 97* 99 105   < > 101   CO2 mmol/L 27.2 26.0 22.8   < > 24.0   BUN mg/dL 10 11 9   < > 9   CREATININE mg/dL 0.58 0.62 0.60   < > 0.65   GLUCOSE mg/dL 215* 186* 187*   < > 263*   CALCIUM mg/dL 8.8 9.3 8.7   < > 9.4   ALT (SGPT) U/L  --   --   --   --  12   AST (SGOT) U/L  --   --   --   --  13   PROBNP pg/mL  --   --   --   --  383.0    < > = values in this interval not displayed.       Estimated Creatinine Clearance: 87.1 mL/min (by C-G formula based on SCr of 0.58 mg/dL).    Microbiology Results Abnormal       Procedure Component Value - Date/Time    COVID PRE-OP / PRE-PROCEDURE SCREENING ORDER (NO ISOLATION) - Swab, Nasopharynx [[id number]]  (Normal) Collected: 08/31/23 1337    Lab Status: Final result Specimen: Swab from Nasopharynx Updated: 08/31/23 1523    Narrative:      The following orders were created for panel order COVID PRE-OP / PRE-PROCEDURE SCREENING ORDER (NO ISOLATION) - Swab, Nasopharynx.  Procedure                               Abnormality         Status                     ---------                               -----------         ------                     COVID-19,[facility] IN-HOUSE...[[id number]]  Normal              Final result                 Please view results for these tests on the individual orders.    COVID-19,[facility] IN-HOUSE CEPHEID/MEGAN NP SWAB IN TRANSPORT MEDIA 8-12 HR TAT - Swab, Nasopharynx [[id number]]  (Normal) Collected: 08/31/23 1337    Lab Status: Final result Specimen: Swab from Nasopharynx Updated: 08/31/23 1523     COVID19 Not Detected    Narrative:      Fact sheet for providers: https://www.fda.gov/media/179215/download    Fact sheet for patients: https://www.fda.gov/media/602915/download    Test performed by PCR.             Imaging Results (Last 24 Hours)       ** No results found for the last 24 hours. **            Results for orders placed in visit on 09/01/23    Diagnostic IntraOp Yves    Narrative  Diagnostic IntraOp Yves    Procedure Performed: Diagnostic IntraOp Yves  Start Time:  9/1/2023 9:05 AM  End Time:   9/1/2023 9:05 AM    Preanesthesia Checklist:  Patient identified, IV assessed, risks and benefits discussed, monitors and equipment assessed, procedure being performed at surgeon's request and anesthesia consent obtained.    General Procedure Information  Diagnostic Indications for Echo:  assessment of ascending aorta, assessment of surgical repair, defect repair evaluation and hemodynamic monitoring  Physician Requesting Echo: Damir Aaron MD  Location performed:  OR  Intubated  Bite block placed  Heart visualized  Probe Insertion:  Easy  Probe Type:  Multiplane  Modalities:  2D only, color flow mapping, continuous wave Doppler and pulse wave Doppler    Echocardiographic and Doppler Measurements    Ventricles    Right Ventricle:  Cavity size normal.  Thrombus not present.  Global function normal.  Left Ventricle:  Cavity size normal.  Diastolic dimension 3 cm.  Thrombus not present.  Ejection Fraction 55%.        Valves    Aortic Valve:  Annulus normal.  Stenosis not present.  Regurgitation trace.  Leaflets normal.    Mitral Valve:  Annulus normal.  Stenosis not present.  Regurgitation trace.    Tricuspid Valve:  Annulus normal.  Stenosis not present.  Regurgitation trace.      Aorta    Ascending Aorta:  Size normal.  Dissection not present.  Plaque thickness less than 3 mm.  Mobile plaque not present.  Aortic Arch:  Size normal.  Dissection not present.  Plaque thickness greater than 3 mm.  Mobile plaque not present.  Descending Aorta:  Size normal.  Dissection not present.  Plaque thickness greater than 3 mm.  Mobile plaque not present.      Atria    Right Atrium:  Size normal.  Thrombus not present.  Tumor not  present.  Device present.    Left Atrium:  Size normal.  Spontaneous echo contrast present.  Thrombus not present.  Tumor not present.  Device not present.  Left atrial appendage normal.      Septa    Atrial Septum:  Intra-atrial septal morphology lipomatous hypertrophy.    Ventricular Septum:  Intra-ventricular septum morphology hypertrophy.    Diastolic Function Measurements:  Diastolic Dysfunction Grade= I  E=  ms  A=  ms  E/A Ratio=  1.2  DT=  ms  S/D=  IVRT=    Other Findings  Pericardium:  normal  Pleural Effusion:  none  Pulmonary Arteries:  normal  Pulmonary Venous Flow:  normal    Anesthesia Information  Performed Personally  Anesthesiologist:  Olivia Patel MD      Echocardiogram Comments:  Severe concentric LV hypertrophy, appears low normal function, LVEF 50-55%.  Normal RV function.  Valves normal.    Post CPB:  S/p CABG  Hyperdynamic LV function, LVEF 65%, inferoseptal hypokinesis  Valves unchanged.  No aortic dissection post decannulation.      I have reviewed the medications:  Scheduled Meds:amiodarone, 200 mg, Oral, Q24H  aspirin, 81 mg, Oral, Daily  atorvastatin, 40 mg, Oral, Nightly  cholecalciferol, 1,000 Units, Oral, Daily  clopidogrel, 75 mg, Oral, Daily  docusate sodium, 100 mg, Oral, BID  enoxaparin, 40 mg, Subcutaneous, Daily  furosemide, 40 mg, Intravenous, Once  [START ON 9/6/2023] furosemide, 40 mg, Oral, Daily  guaiFENesin, 1,200 mg, Oral, Q12H  insulin glargine, 20 Units, Subcutaneous, Q12H  insulin lispro, 2-9 Units, Subcutaneous, 4x Daily AC & at Bedtime  insulin lispro, 5 Units, Subcutaneous, TID With Meals  metoprolol tartrate, 25 mg, Oral, Q12H  mupirocin, , Each Nare, BID  pantoprazole, 40 mg, Oral, QAM  polyethylene glycol, 17 g, Oral, Daily  potassium chloride, 20 mEq, Oral, Daily  potassium chloride ER, 40 mEq, Oral, Q4H  senna-docusate sodium, 2 tablet, Oral, Nightly  sodium chloride, 10 mL, Intravenous, Q12H      Continuous Infusions:clevidipine, 2-32  mg/hr  dexmedetomidine, 0.2-1.5 mcg/kg/hr, Last Rate: Stopped (09/01/23 1330)  DOPamine, 2-20 mcg/kg/min  EPINEPHrine, 0.02-0.1 mcg/kg/min  milrinone, 0.25-0.375 mcg/kg/min  niCARdipine, 5-15 mg/hr, Last Rate: Stopped (09/01/23 1156)  nitroglycerin, 5-200 mcg/min  norepinephrine, 0.02-0.2 mcg/kg/min, Last Rate: Stopped (09/01/23 1640)  phenylephrine, 0.2-2 mcg/kg/min  propofol, 5-50 mcg/kg/min, Last Rate: Stopped (09/01/23 1205)  sodium chloride, 30 mL/hr, Last Rate: 30 mL/hr (09/01/23 1120)      PRN Meds:.  acetaminophen **OR** acetaminophen **OR** acetaminophen    ALPRAZolam    aluminum-magnesium hydroxide-simethicone    bisacodyl    bisacodyl    clevidipine    cyclobenzaprine    dextrose    dextrose    DOPamine    EPINEPHrine    glucagon (human recombinant)    HYDROcodone-acetaminophen    midazolam    milrinone    Morphine **AND** naloxone    Morphine    niCARdipine    nitroglycerin    norepinephrine    ondansetron    oxyCODONE    phenylephrine    polyethylene glycol    Potassium Replacement - Follow Nurse / BPA Driven Protocol    propofol    sodium chloride    sodium chloride    Assessment & Plan   Assessment & Plan     Active Hospital Problems    Diagnosis  POA    **CAD (coronary artery disease) [I25.10]  Yes    Type 2 diabetes mellitus with hyperglycemia [E11.65]  Yes    Hypertension, essential [I10]  Yes    Elevated cholesterol [E78.00]  Yes    Diabetic polyneuropathy associated with type 2 diabetes mellitus [E11.42]  Yes    CAD, multiple vessel [I25.10]  Clinically Undetermined    Coronary atherosclerosis due to calcified coronary lesion (CODE) [I25.84]  Yes      Resolved Hospital Problems   No resolved problems to display.        Brief Hospital Course to date:  Lennie Gomez is a 68 y.o. female  presents the hospital with CAD status post CABG Hospital medicine has been consulted for diabetes management.     Discussion/plan:  Replete potassium today.  Platelets now normalized.  Glucose reviewed.  Insulin  further adjusted as appetite is increasing patient's blood sugar is starting to increase as well.  TCAR is anticipated on the left in a few weeks.      Diabetes:   Monitor glucose and adjust insulin as needed.    CAD: Status post CABG.  Postoperative supportive care and symptom treatment and care per primary service.  Aspirin and statin.      Stroke with carotid stenosis: Antiplatelet, statin.  History of carotid disease noted.  Neurology recommends outpatient follow-up and eventual TCAR intervention after she recovers further.     Essential hypertension:  Receiving postoperative pressors.  Beta-blocker.     Hyperlipidemia:  Atorvastatin     vitamin D deficiency: Started D3.  Current level is 28     Treatment plan discussed with the patient who is in agreement.      CODE STATUS:   Code Status and Medical Interventions:   Ordered at: 09/01/23 1012     Code Status (Patient has no pulse and is not breathing):    CPR (Attempt to Resuscitate)     Medical Interventions (Patient has pulse or is breathing):    Full Support       Earnest Perrin MD  09/05/23

## 2023-09-05 NOTE — THERAPY EVALUATION
Patient Name: Lennie Gomez  : 1955    MRN: [id number]                              Today's Date: 2023       Admit Date: 2023    Visit Dx:     ICD-10-CM ICD-9-CM   1. S/P CABG (coronary artery bypass graft)  Z95.1 V45.81     Patient Active Problem List   Diagnosis    Preoperative clearance    Abnormal nuclear stress test    CAD, multiple vessel    Coronary atherosclerosis due to calcified coronary lesion (CODE)    CAD (coronary artery disease)    Type 2 diabetes mellitus with hyperglycemia    Hypertension, essential    Elevated cholesterol    Diabetic polyneuropathy associated with type 2 diabetes mellitus     Past Medical History:   Diagnosis Date    Arthritis     Coronary artery disease     Diabetes mellitus     Elevated cholesterol     Elevated cholesterol 2023    Hypertension     Myocardial infarction      Past Surgical History:   Procedure Laterality Date    CARDIAC CATHETERIZATION      CARDIAC CATHETERIZATION N/A 2023    Procedure: Left Heart Cath with possible coronary angioplasty;  Surgeon: Surendra Sprague MD;  Location: [facility] CATH INVASIVE LOCATION;  Service: Cardiology;  Laterality: N/A;     SECTION      CORONARY ARTERY BYPASS GRAFT N/A 2023    Procedure: SCOTT STERNOTOMY CORONARY ARTERY BYPASS GRAFT TIMES 4 USING LEFT INTERNAL MAMMARY ARTERY AND RIGHT GREATER SAPHENOUS VEIN GRAFT PER ENDOSCOPIC VEIN HARVESTING AND PRP;  Surgeon: Damir Aaron MD;  Location: [facility];  Service: Cardiothoracic;  Laterality: N/A;      General Information       Row Name 23 1435          OT Time and Intention    Document Type evaluation  -RB     Mode of Treatment occupational therapy  -RB       Row Name 23 1435          General Information    Patient Profile Reviewed yes  -RB     Prior Level of Function independent:;ADL's;transfer  -RB     Existing Precautions/Restrictions fall;sternal;cardiac  -RB     Barriers to Rehab medically complex  -RB        Row Name 09/05/23 1435          Living Environment    People in Home grandchild(sury)  -RB       Row Name 09/05/23 1435          Home Main Entrance    Number of Stairs, Main Entrance five  -RB       Row Name 09/05/23 1435          Cognition    Orientation Status (Cognition) oriented x 3  -RB       Row Name 09/05/23 1435          Safety Issues, Functional Mobility    Safety Issues Affecting Function (Mobility) safety precautions follow-through/compliance;safety precaution awareness;awareness of need for assistance;insight into deficits/self-awareness  -RB     Impairments Affecting Function (Mobility) balance;coordination;endurance/activity tolerance;strength;shortness of breath  -RB               User Key  (r) = Recorded By, (t) = Taken By, (c) = Cosigned By      Initials Name Provider Type    RB Jojo Thurman OT Occupational Therapist                     Mobility/ADL's       Row Name 09/05/23 1436          Bed Mobility    Bed Mobility supine-sit;sit-supine  -RB     Supine-Sit [place] (Bed Mobility) minimum assist (75% patient effort);verbal cues  -RB     Sit-Supine [place] (Bed Mobility) minimum assist (75% patient effort);1 person assist;verbal cues  -RB     Assistive Device (Bed Mobility) bed rails  -RB     Comment, (Bed Mobility) requested to return to supine post session  -RB       Row Name 09/05/23 1436          Transfers    Transfers sit-stand transfer;stand-sit transfer;toilet transfer  -RB       Row Name 09/05/23 1436          Sit-Stand Transfer    Sit-Stand [place] (Transfers) minimum assist (75% patient effort);2 person assist;verbal cues  -RB     Comment, (Sit-Stand Transfer) hha  -RB       Row Name 09/05/23 1436          Stand-Sit Transfer    Stand-Sit [place] (Transfers) minimum assist (75% patient effort);2 person assist;verbal cues  -RB     Comment, (Stand-Sit Transfer) hha  -RB       Row Name 09/05/23 1436          Toilet Transfer    Type (Toilet Transfer)  sit-stand;stand-sit  -RB     [place] Level (Toilet Transfer) minimum assist (75% patient effort);verbal cues  -RB     Comment, (Toilet Transfer) low commode limiting independence  -RB       Row Name 09/05/23 1436          Functional Mobility    Functional Mobility- Ind. Level contact guard assist  -RB     Functional Mobility- Comment HHA, no walker, assist with O2 line management  -RB       Row Name 09/05/23 1436          Activities of Daily Living    BADL Assessment/Intervention toileting;grooming  -RB       [facility] Name 09/05/23 [facility]6          Toileting Assessment/Training    [place] Level (Toileting) toileting skills;set up  -RB     Position (Toileting) supported sitting  -RB       Row Name 09/05/23 1436          Grooming Assessment/Training    [place] Level (Grooming) grooming skills;contact guard assist  -RB     Position (Grooming) sink side;supported standing  -RB               User Key  (r) = Recorded By, (t) = Taken By, (c) = Cosigned By      Initials Name Provider Type    Jojo Ayoub OT Occupational Therapist                   Obj/Interventions       [facility] Name 09/05/23 1438          Sensory Assessment (Somatosensory)    Sensory Assessment (Somatosensory) sensation intact  -[facility] Name 09/05/23 1438          Vision Assessment/Intervention    Visual Impairment/Limitations WFL  -RB       [facility] Name 09/05/23 1438          Range of Motion Comprehensive    Comment, General Range of Motion BUE WFL while maintaining sternal precautions  -[facility] Name 09/05/23 1438          Strength Comprehensive (MMT)    Comment, General Manual Muscle Testing (MMT) Assessment Generalized weakness post op - no focal deficits  -       Row Name 09/05/23 1438          Balance    Comment, Balance CGA/Min A for standing balance  -RB               User Key  (r) = Recorded By, (t) = Taken By, (c) = Cosigned By      Initials Name Provider Type    Jojo Ayoub OT Occupational Therapist                    Goals/Plan       Row Name 09/05/23 1433          Bed Mobility Goal 1 (OT)    Activity/Assistive Device (Bed Mobility Goal 1, OT) bed mobility activities, all  -RB     [place] Level/Cues Needed (Bed Mobility Goal 1, OT) supervision required  -RB     Time Frame (Bed Mobility Goal 1, OT) short term goal (STG);2 weeks  -RB     Progress/Outcomes (Bed Mobility Goal 1, OT) continuing progress toward goal  -RB       Row Name 09/05/23 1433          Transfer Goal 1 (OT)    Activity/Assistive Device (Transfer Goal 1, OT) transfers, all  -RB     [place] Level/Cues Needed (Transfer Goal 1, OT) supervision required  -RB     Time Frame (Transfer Goal 1, OT) short term goal (STG);2 weeks  -RB     Progress/Outcome (Transfer Goal 1, OT) continuing progress toward goal  -RB       Row Name 09/05/23 [facility]3          Bathing Goal 1 (OT)    Activity/Device (Bathing Goal 1, OT) bathing skills, all  -RB     [place] Level/Cues Needed (Bathing Goal 1, OT) supervision required  -RB     Time Frame (Bathing Goal 1, OT) short term goal (STG);2 weeks  -RB     Progress/Outcomes (Bathing Goal 1, OT) continuing progress toward goal  -RB       Row Name 09/05/23 1433          Dressing Goal 1 (OT)    Activity/Device (Dressing Goal 1, OT) dressing skills, all  -RB     [place]/Cues Needed (Dressing Goal 1, OT) supervision required  -RB     Time Frame (Dressing Goal 1, OT) short term goal (STG);2 weeks  -RB     Progress/Outcome (Dressing Goal 1, OT) continuing progress toward goal  -RB       Row Name 09/05/23 1433          Toileting Goal 1 (OT)    Activity/Device (Toileting Goal 1, OT) toileting skills, all  -RB     [place] Level/Cues Needed (Toileting Goal 1, OT) supervision required  -RB     Time Frame (Toileting Goal 1, OT) short term goal (STG);2 weeks  -RB     Progress/Outcome (Toileting Goal 1, OT) continuing progress toward goal  -RB       Row Name 09/05/23 1433          Grooming Goal 1 (OT)    Activity/Device (Grooming Goal 1,  OT) grooming skills, all  -RB     [place] (Grooming Goal 1, OT) supervision required  -RB     Time Frame (Grooming Goal 1, OT) short term goal (STG);2 weeks  -RB     Progress/Outcome (Grooming Goal 1, OT) continuing progress toward goal  -RB       Row Name 09/05/23 1436          Therapy Assessment/Plan (OT)    Planned Therapy Interventions (OT) transfer/mobility retraining;strengthening exercise;ROM/therapeutic exercise;activity tolerance training;BADL retraining;functional balance retraining;occupation/activity based interventions;patient/caregiver education/training;neuromuscular control/coordination retraining  -RB               User Key  (r) = Recorded By, (t) = Taken By, (c) = Cosigned By      Initials Name Provider Type    RB Jojo Thurman OT Occupational Therapist                   Clinical Impression       Row Name 09/05/23 1436          Pain Assessment    Pretreatment Pain Rating 0/10 - no pain  -RB     Posttreatment Pain Rating 0/10 - no pain  -RB       Row Name 09/05/23 1438          Plan of Care Review    Plan of Care Reviewed With patient  -RB     Progress no change  -RB     Outcome Evaluation The pt was admitted to [facility] 2/2 to CAD, DM2, HTN and to tobacco use. She is POD 4 from a severe multi-vessel CAD -s/p CABGx4 LIMA/RSVG. She lives with two dependent (12 and 2 yo) grandchildren who is she raising. She completed bed mobility with PT. She stood with CGA/Min A x2 and mobilized into the bathroom with CGA. Low commode and CGA/Min A provided for safety. She was able to mobilize into the hallway with CGA. O2 >93% on 3L. She is motivated to improve and plans to d/c home with HH. Encouraged her to continue x3-4 times mobilizing per day as well as sitting up as much as she can tolerate.  -RB       Row Name 09/05/23 1437          Therapy Assessment/Plan (OT)    Rehab Potential (OT) good, to achieve stated therapy goals  -RB     Criteria for Skilled Therapeutic Interventions Met (OT) skilled treatment  is necessary;yes  -RB     Therapy Frequency (OT) 5 times/wk  -RB       Row Name 09/05/23 1439          Therapy Plan Review/Discharge Plan (OT)    Anticipated Discharge Disposition (OT) home with /7 care;home with home health  -RB       Row Name 09/05/23 1439          Vital Signs    Pre SpO2 (%) 96  -RB     O2 Delivery Pre Treatment supplemental O2  -RB     O2 Delivery Intra Treatment supplemental O2  -RB     Post SpO2 (%) 97  -RB     O2 Delivery Post Treatment supplemental O2  -RB     Pre Patient Position Supine  -RB     Intra Patient Position Standing  -RB     Post Patient Position Supine  -RB       Row Name 09/05/23 1439          Positioning and Restraints    Pre-Treatment Position in bed  -RB     Post Treatment Position bed  -RB     In Bed notified nsg;supine;call light within reach;encouraged to call for assist;exit alarm on;fowlers;legs elevated  -RB               User Key  (r) = Recorded By, (t) = Taken By, (c) = Cosigned By      Initials Name Provider Type    RB Jojo Thurman, PARVEEN Occupational Therapist                   Outcome Measures       Row Name 09/05/23 1434 09/05/23 1433       How much help from another is currently needed...    Putting on and taking off regular lower body clothing? 2  -RB 2  -RB    Bathing (including washing, rinsing, and drying) 2  -RB 2  -RB    Toileting (which includes using toilet bed pan or urinal) 3  -RB 3  -RB    Putting on and taking off regular upper body clothing 3  -RB 3  -RB    Taking care of personal grooming (such as brushing teeth) 3  -RB 3  -RB    Eating meals 3  -RB 3  -RB    AM-PAC 6 Clicks Score (OT) 16  -RB 16  -RB      Row Name 09/05/23 1427          How much help from another person do you currently need...    Turning from your back to your side while in flat bed without using bedrails? 3  -EJ     Moving from lying on back to sitting on the side of a flat bed without bedrails? 3  -EJ     Moving to and from a bed to a chair (including a wheelchair)? 3  -EJ      Standing up from a chair using your arms (e.g., wheelchair, bedside chair)? 3  -EJ     Climbing 3-5 steps with a railing? 3  -EJ     To walk in hospital room? 3  -EJ     AM-PAC 6 Clicks Score (PT) 18  -EJ     Highest level of mobility 6 --> Walked 10 steps or more  -EJ       Row Name 09/05/23 1434 09/05/23 1433       Modified [place] Scale    Modified [place] Scale 4 - Moderately severe disability.  Unable to walk without assistance, and unable to attend to own bodily needs without assistance.  -RB 4 - Moderately severe disability.  Unable to walk without assistance, and unable to attend to own bodily needs without assistance.  -RB      Row Name 09/05/23 1434 09/05/23 1433       Functional Assessment    Outcome Measure Options AM-PAC 6 Clicks Daily Activity (OT);Modified Terri  -RB AM-PAC 6 Clicks Daily Activity (OT);Modified [place]  -RB              User Key  (r) = Recorded By, (t) = Taken By, (c) = Cosigned By      Initials Name Provider Type    EJ Yuli Goode, PT Physical Therapist    RB Jojo Thurman, OT Occupational Therapist                    Occupational Therapy Education       Title: PT OT SLP Therapies (In Progress)       Topic: Occupational Therapy (Not Started)       Point: ADL training (Not Started)       Description:   Instruct learner(s) on proper safety adaptation and remediation techniques during self care or transfers.   Instruct in proper use of assistive devices.                  Learner Progress:  Not documented in this visit.              Point: Home exercise program (Not Started)       Description:   Instruct learner(s) on appropriate technique for monitoring, assisting and/or progressing therapeutic exercises/activities.                  Learner Progress:  Not documented in this visit.              Point: Precautions (Not Started)       Description:   Instruct learner(s) on prescribed precautions during self-care and functional transfers.                  Learner Progress:  Not  documented in this visit.              Point: Body mechanics (Not Started)       Description:   Instruct learner(s) on proper positioning and spine alignment during self-care, functional mobility activities and/or exercises.                  Learner Progress:  Not documented in this visit.                                  OT Recommendation and Plan  Planned Therapy Interventions (OT): transfer/mobility retraining, strengthening exercise, ROM/therapeutic exercise, activity tolerance training, BADL retraining, functional balance retraining, occupation/activity based interventions, patient/caregiver education/training, neuromuscular control/coordination retraining  Therapy Frequency (OT): 5 times/wk  Plan of Care Review  Plan of Care Reviewed With: patient  Progress: no change  Outcome Evaluation: The pt was admitted to [facility] 2/2 to CAD, DM2, HTN and to tobacco use. She is POD 4 from a severe multi-vessel CAD -s/p CABGx4 LIMA/RSVG. She lives with two dependent (12 and 2 yo) grandchildren who is she raising. She completed bed mobility with PT. She stood with CGA/Min A x2 and mobilized into the bathroom with CGA. Low commode and CGA/Min A provided for safety. She was able to mobilize into the hallway with CGA. O2 >93% on 3L. She is motivated to improve and plans to d/c home with HH. Encouraged her to continue x3-4 times mobilizing per day as well as sitting up as much as she can tolerate.     Time Calculation:   Evaluation Complexity (OT)  Review Occupational Profile/Medical/Therapy History Complexity: expanded/moderate complexity  Assessment, Occupational Performance/Identification of Deficit Complexity: 3-5 performance deficits  Clinical Decision Making Complexity (OT): detailed assessment/moderate complexity  Overall Complexity of Evaluation (OT): moderate complexity     Time Calculation- OT       Row Name 09/05/23 1432             Time Calculation- OT    OT Start Time 1330  -RB      OT Stop Time 1356  -RB      OT Time  Calculation (min) 26 min  -RB      Total Timed Code Minutes- OT 10 minute(s)  -RB      OT Received On 09/05/23  -RB      OT - Next Appointment 09/06/23  -RB      OT Goal Re-Cert Due Date 09/19/23  -RB         Timed Charges    62631 - OT Self Care/Mgmt Minutes 10  -RB         Untimed Charges    OT Eval/Re-eval Minutes 16  -RB         Total Minutes    Timed Charges Total Minutes 10  -RB      Untimed Charges Total Minutes 16  -RB       Total Minutes 26  -RB                User Key  (r) = Recorded By, (t) = Taken By, (c) = Cosigned By      Initials Name Provider Type    RB Jojo Thurman OT Occupational Therapist                  Therapy Charges for Today       Code Description Service Date Service Provider Modifiers Qty    [id number] HC OT SELF CARE/MGMT/TRAIN EA 15 MIN 9/5/2023 Jojo Thurman OT GO 1    [id number] HC OT EVAL MOD COMPLEXITY 2 9/5/2023 Jojo Thurman OT GO 1                 Jojo Thurman OT  9/5/2023

## 2023-09-06 ENCOUNTER — APPOINTMENT (OUTPATIENT)
Dept: GENERAL RADIOLOGY | Facility: HOSPITAL | Age: 68
DRG: 236 | End: 2023-09-06
Payer: MEDICARE

## 2023-09-06 LAB
ANION GAP SERPL CALCULATED.3IONS-SCNC: 10 MMOL/L (ref 5–15)
BUN SERPL-MCNC: 9 MG/DL (ref 8–23)
BUN/CREAT SERPL: 16.4 (ref 7–25)
CALCIUM SPEC-SCNC: 9.3 MG/DL (ref 8.6–10.5)
CHLORIDE SERPL-SCNC: 99 MMOL/L (ref 98–107)
CO2 SERPL-SCNC: 30 MMOL/L (ref 22–29)
CREAT SERPL-MCNC: 0.55 MG/DL (ref 0.57–1)
EGFRCR SERPLBLD CKD-EPI 2021: 100.6 ML/MIN/1.73
GLUCOSE BLDC GLUCOMTR-MCNC: 130 MG/DL (ref 70–130)
GLUCOSE BLDC GLUCOMTR-MCNC: 137 MG/DL (ref 70–130)
GLUCOSE BLDC GLUCOMTR-MCNC: 175 MG/DL (ref 70–130)
GLUCOSE BLDC GLUCOMTR-MCNC: 175 MG/DL (ref 70–130)
GLUCOSE SERPL-MCNC: 118 MG/DL (ref 65–99)
POTASSIUM SERPL-SCNC: 3.4 MMOL/L (ref 3.5–5.2)
POTASSIUM SERPL-SCNC: 4.2 MMOL/L (ref 3.5–5.2)
SODIUM SERPL-SCNC: 139 MMOL/L (ref 136–145)

## 2023-09-06 PROCEDURE — 71045 X-RAY EXAM CHEST 1 VIEW: CPT

## 2023-09-06 PROCEDURE — 97535 SELF CARE MNGMENT TRAINING: CPT

## 2023-09-06 PROCEDURE — 97110 THERAPEUTIC EXERCISES: CPT

## 2023-09-06 PROCEDURE — 94799 UNLISTED PULMONARY SVC/PX: CPT

## 2023-09-06 PROCEDURE — 84132 ASSAY OF SERUM POTASSIUM: CPT | Performed by: THORACIC SURGERY (CARDIOTHORACIC VASCULAR SURGERY)

## 2023-09-06 PROCEDURE — 99024 POSTOP FOLLOW-UP VISIT: CPT | Performed by: THORACIC SURGERY (CARDIOTHORACIC VASCULAR SURGERY)

## 2023-09-06 PROCEDURE — 25010000002 ENOXAPARIN PER 10 MG: Performed by: NURSE PRACTITIONER

## 2023-09-06 PROCEDURE — 63710000001 INSULIN LISPRO (HUMAN) PER 5 UNITS: Performed by: INTERNAL MEDICINE

## 2023-09-06 PROCEDURE — 94762 N-INVAS EAR/PLS OXIMTRY CONT: CPT

## 2023-09-06 PROCEDURE — 92523 SPEECH SOUND LANG COMPREHEN: CPT

## 2023-09-06 PROCEDURE — 82948 REAGENT STRIP/BLOOD GLUCOSE: CPT

## 2023-09-06 PROCEDURE — 25010000002 FUROSEMIDE PER 20 MG: Performed by: NURSE PRACTITIONER

## 2023-09-06 PROCEDURE — 63710000001 INSULIN GLARGINE PER 5 UNITS: Performed by: INTERNAL MEDICINE

## 2023-09-06 PROCEDURE — 80048 BASIC METABOLIC PNL TOTAL CA: CPT | Performed by: NURSE PRACTITIONER

## 2023-09-06 PROCEDURE — 97530 THERAPEUTIC ACTIVITIES: CPT

## 2023-09-06 RX ORDER — BISACODYL 10 MG
10 SUPPOSITORY, RECTAL RECTAL ONCE
Status: COMPLETED | OUTPATIENT
Start: 2023-09-06 | End: 2023-09-06

## 2023-09-06 RX ORDER — POTASSIUM CHLORIDE 750 MG/1
20 TABLET, FILM COATED, EXTENDED RELEASE ORAL ONCE
Status: COMPLETED | OUTPATIENT
Start: 2023-09-06 | End: 2023-09-06

## 2023-09-06 RX ORDER — FUROSEMIDE 10 MG/ML
40 INJECTION INTRAMUSCULAR; INTRAVENOUS ONCE
Status: COMPLETED | OUTPATIENT
Start: 2023-09-06 | End: 2023-09-06

## 2023-09-06 RX ORDER — DOXYCYCLINE 100 MG/1
100 CAPSULE ORAL EVERY 12 HOURS SCHEDULED
Status: DISCONTINUED | OUTPATIENT
Start: 2023-09-06 | End: 2023-09-08 | Stop reason: HOSPADM

## 2023-09-06 RX ORDER — POTASSIUM CHLORIDE 750 MG/1
40 TABLET, FILM COATED, EXTENDED RELEASE ORAL EVERY 4 HOURS
Status: COMPLETED | OUTPATIENT
Start: 2023-09-06 | End: 2023-09-06

## 2023-09-06 RX ADMIN — INSULIN GLARGINE 25 UNITS: 100 INJECTION, SOLUTION SUBCUTANEOUS at 08:53

## 2023-09-06 RX ADMIN — POTASSIUM CHLORIDE 20 MEQ: 750 TABLET, EXTENDED RELEASE ORAL at 16:08

## 2023-09-06 RX ADMIN — POTASSIUM CHLORIDE 40 MEQ: 750 TABLET, EXTENDED RELEASE ORAL at 10:52

## 2023-09-06 RX ADMIN — MUPIROCIN 1 APPLICATION: 20 OINTMENT TOPICAL at 08:52

## 2023-09-06 RX ADMIN — BISACODYL 10 MG: 10 SUPPOSITORY RECTAL at 16:08

## 2023-09-06 RX ADMIN — FUROSEMIDE 40 MG: 40 TABLET ORAL at 08:52

## 2023-09-06 RX ADMIN — INSULIN LISPRO 3 UNITS: 100 INJECTION, SOLUTION INTRAVENOUS; SUBCUTANEOUS at 21:15

## 2023-09-06 RX ADMIN — PANTOPRAZOLE SODIUM 40 MG: 40 TABLET, DELAYED RELEASE ORAL at 06:31

## 2023-09-06 RX ADMIN — METOPROLOL TARTRATE 37.5 MG: 25 TABLET, FILM COATED ORAL at 08:53

## 2023-09-06 RX ADMIN — Medication 1000 UNITS: at 08:52

## 2023-09-06 RX ADMIN — ENOXAPARIN SODIUM 40 MG: 100 INJECTION SUBCUTANEOUS at 17:54

## 2023-09-06 RX ADMIN — ASPIRIN 81 MG: 81 TABLET, COATED ORAL at 08:52

## 2023-09-06 RX ADMIN — DOXYCYCLINE 100 MG: 100 CAPSULE ORAL at 12:23

## 2023-09-06 RX ADMIN — GUAIFENESIN 1200 MG: 600 TABLET, EXTENDED RELEASE ORAL at 20:41

## 2023-09-06 RX ADMIN — POLYETHYLENE GLYCOL 3350 17 G: 17 POWDER, FOR SOLUTION ORAL at 08:52

## 2023-09-06 RX ADMIN — ATORVASTATIN CALCIUM 40 MG: 20 TABLET, FILM COATED ORAL at 20:40

## 2023-09-06 RX ADMIN — AMIODARONE HYDROCHLORIDE 200 MG: 200 TABLET ORAL at 08:52

## 2023-09-06 RX ADMIN — INSULIN LISPRO 7 UNITS: 100 INJECTION, SOLUTION INTRAVENOUS; SUBCUTANEOUS at 12:23

## 2023-09-06 RX ADMIN — DOXYCYCLINE 100 MG: 100 CAPSULE ORAL at 20:41

## 2023-09-06 RX ADMIN — POTASSIUM CHLORIDE 40 MEQ: 750 TABLET, EXTENDED RELEASE ORAL at 06:31

## 2023-09-06 RX ADMIN — MUPIROCIN 1 APPLICATION: 20 OINTMENT TOPICAL at 20:41

## 2023-09-06 RX ADMIN — Medication 10 ML: at 08:54

## 2023-09-06 RX ADMIN — CLOPIDOGREL BISULFATE 75 MG: 75 TABLET, FILM COATED ORAL at 08:52

## 2023-09-06 RX ADMIN — INSULIN LISPRO 3 UNITS: 100 INJECTION, SOLUTION INTRAVENOUS; SUBCUTANEOUS at 12:23

## 2023-09-06 RX ADMIN — METOPROLOL TARTRATE 25 MG: 25 TABLET, FILM COATED ORAL at 20:41

## 2023-09-06 RX ADMIN — GUAIFENESIN 1200 MG: 600 TABLET, EXTENDED RELEASE ORAL at 08:52

## 2023-09-06 RX ADMIN — Medication 10 ML: at 20:41

## 2023-09-06 RX ADMIN — FUROSEMIDE 40 MG: 10 INJECTION, SOLUTION INTRAMUSCULAR; INTRAVENOUS at 16:08

## 2023-09-06 RX ADMIN — INSULIN LISPRO 7 UNITS: 100 INJECTION, SOLUTION INTRAVENOUS; SUBCUTANEOUS at 07:50

## 2023-09-06 NOTE — THERAPY TREATMENT NOTE
Patient Name: Lennie Gomez  : 1955    MRN: [id number]                              Today's Date: 2023       Admit Date: 2023    Visit Dx:     ICD-10-CM ICD-9-CM   1. S/P CABG (coronary artery bypass graft)  Z95.1 V45.81     Patient Active Problem List   Diagnosis    Preoperative clearance    Abnormal nuclear stress test    CAD, multiple vessel    Coronary atherosclerosis due to calcified coronary lesion (CODE)    CAD (coronary artery disease)    Type 2 diabetes mellitus with hyperglycemia    Hypertension, essential    Elevated cholesterol    Diabetic polyneuropathy associated with type 2 diabetes mellitus     Past Medical History:   Diagnosis Date    Arthritis     Coronary artery disease     Diabetes mellitus     Elevated cholesterol     Elevated cholesterol 2023    Hypertension     Myocardial infarction      Past Surgical History:   Procedure Laterality Date    CARDIAC CATHETERIZATION      CARDIAC CATHETERIZATION N/A 2023    Procedure: Left Heart Cath with possible coronary angioplasty;  Surgeon: Surendra Sprague MD;  Location: [facility] CATH INVASIVE LOCATION;  Service: Cardiology;  Laterality: N/A;     SECTION      CORONARY ARTERY BYPASS GRAFT N/A 2023    Procedure: SCOTT STERNOTOMY CORONARY ARTERY BYPASS GRAFT TIMES 4 USING LEFT INTERNAL MAMMARY ARTERY AND RIGHT GREATER SAPHENOUS VEIN GRAFT PER ENDOSCOPIC VEIN HARVESTING AND PRP;  Surgeon: Damir Aaron MD;  Location: [facility];  Service: Cardiothoracic;  Laterality: N/A;      General Information       Row Name 23 1556          OT Time and Intention    Document Type therapy note (daily note)  -RB     Mode of Treatment occupational therapy  -RB       Row Name 23 1556          General Information    Patient Profile Reviewed yes  -RB       Row Name 23 1556          Cognition    Orientation Status (Cognition) oriented x 3  -RB               User Key  (r) = Recorded By, (t) = Taken By,  (c) = Cosigned By      Initials Name Provider Type    Jojo Ayoub OT Occupational Therapist                     Mobility/ADL's       Row Name 09/06/23 1554          Bed Mobility    Bed Mobility sit-supine  -RB     Sit-Supine Gillespie (Bed Mobility) verbal cues;contact guard  -RB       Row Name 09/06/23 1554          Transfers    Transfers sit-stand transfer;stand-sit transfer  -RB       Row Name 09/06/23 1554          Sit-Stand Transfer    Sit-Stand Gillespie (Transfers) standby assist;contact guard  -RB     Comment, (Sit-Stand Transfer) hha  -RB       Row Name 09/06/23 1554          Stand-Sit Transfer    Stand-Sit Gillespie (Transfers) standby assist;contact guard  -RB     Comment, (Stand-Sit Transfer) hha  -RB       Row Name 09/06/23 1554          Toilet Transfer    Type (Toilet Transfer) sit-stand;stand-sit  -RB     Comment, (Toilet Transfer) a  -RB       Row Name 09/06/23 1554          Activities of Daily Living    BADL Assessment/Intervention lower body dressing  -RB       Row Name 09/06/23 1554          Lower Body Dressing Assessment/Training    Gillespie Level (Lower Body Dressing) socks;set up  -RB     Position (Lower Body Dressing) edge of bed sitting  -RB     Comment, (Lower Body Dressing) R hand impaired pinch and grasp , increased time  -RB               User Key  (r) = Recorded By, (t) = Taken By, (c) = Cosigned By      Initials Name Provider Type    Jojo Ayoub OT Occupational Therapist                   Obj/Interventions       Row Name 09/06/23 1552          Motor Skills    Therapeutic Exercise --  Theraputty, weighted foam provided- pt able to demo exercises after mobilizing on her unit. OT provided demo of exercises to complete outside of therapy hours  -RB       Row Name 09/06/23 1552          Balance    Comment, Balance SBA/CGA for standing balance - slow and steady pacing, no LOB seen  -RB               User Key  (r) = Recorded By, (t) = Taken By, (c) = Cosigned  By      Initials Name Provider Type    Jojo Ayoub OT Occupational Therapist                   Goals/Plan    No documentation.                  Clinical Impression       Row Name 09/06/23 1552          Pain Assessment    Pretreatment Pain Rating 0/10 - no pain  -RB     Posttreatment Pain Rating 0/10 - no pain  -RB       Row Name 09/06/23 1552          Plan of Care Review    Progress improving  -RB       Row Name 09/06/23 1552          Therapy Assessment/Plan (OT)    Rehab Potential (OT) good, to achieve stated therapy goals  -RB     Criteria for Skilled Therapeutic Interventions Met (OT) yes;skilled treatment is necessary  -RB     Therapy Frequency (OT) 5 times/wk  -RB       Row Name 09/06/23 1552          Therapy Plan Review/Discharge Plan (OT)    Anticipated Discharge Disposition (OT) home with home health;home with 24/7 care;home with outpatient therapy services  -RB               User Key  (r) = Recorded By, (t) = Taken By, (c) = Cosigned By      Initials Name Provider Type    Jojo Ayoub OT Occupational Therapist                   Outcome Measures       Row Name 09/06/23 1043          How much help from another person do you currently need...    Turning from your back to your side while in flat bed without using bedrails? 4  -EJ     Moving from lying on back to sitting on the side of a flat bed without bedrails? 3  -EJ     Moving to and from a bed to a chair (including a wheelchair)? 3  -EJ     Standing up from a chair using your arms (e.g., wheelchair, bedside chair)? 3  -EJ     Climbing 3-5 steps with a railing? 3  -EJ     To walk in hospital room? 3  -EJ     AM-PAC 6 Clicks Score (PT) 19  -EJ     Highest level of mobility 6 --> Walked 10 steps or more  -EJ               User Key  (r) = Recorded By, (t) = Taken By, (c) = Cosigned By      Initials Name Provider Type    Yuli Lucas, PT Physical Therapist                    Occupational Therapy Education       Title: PT OT SLP  Therapies (In Progress)       Topic: Occupational Therapy (Not Started)       Point: ADL training (Not Started)       Description:   Instruct learner(s) on proper safety adaptation and remediation techniques during self care or transfers.   Instruct in proper use of assistive devices.                  Learner Progress:  Not documented in this visit.              Point: Home exercise program (Not Started)       Description:   Instruct learner(s) on appropriate technique for monitoring, assisting and/or progressing therapeutic exercises/activities.                  Learner Progress:  Not documented in this visit.              Point: Precautions (Not Started)       Description:   Instruct learner(s) on prescribed precautions during self-care and functional transfers.                  Learner Progress:  Not documented in this visit.              Point: Body mechanics (Not Started)       Description:   Instruct learner(s) on proper positioning and spine alignment during self-care, functional mobility activities and/or exercises.                  Learner Progress:  Not documented in this visit.                                  OT Recommendation and Plan  Planned Therapy Interventions (OT): transfer/mobility retraining, strengthening exercise, ROM/therapeutic exercise, activity tolerance training, BADL retraining, functional balance retraining, occupation/activity based interventions, patient/caregiver education/training, neuromuscular control/coordination retraining  Therapy Frequency (OT): 5 times/wk  Plan of Care Review  Plan of Care Reviewed With: patient  Progress: improving  Outcome Evaluation: The pt was admitted to [facility] 2/2 to CAD, DM2, HTN and to tobacco use. She is POD 4 from a severe multi-vessel CAD -s/p CABGx4 LIMA/RSVG. She lives with two dependent (12 and 2 yo) grandchildren who is she raising. She completed bed mobility with PT. She stood with CGA/Min A x2 and mobilized into the bathroom with CGA. Low  commode and CGA/Min A provided for safety. She was able to mobilize into the hallway with CGA. O2 >93% on 3L. She is motivated to improve and plans to d/c home with HH. Encouraged her to continue x3-4 times mobilizing per day as well as sitting up as much as she can tolerate.     Time Calculation:   Evaluation Complexity (OT)  Review Occupational Profile/Medical/Therapy History Complexity: expanded/moderate complexity  Assessment, Occupational Performance/Identification of Deficit Complexity: 3-5 performance deficits  Clinical Decision Making Complexity (OT): detailed assessment/moderate complexity  Overall Complexity of Evaluation (OT): moderate complexity     Time Calculation- OT       Row Name 09/06/23 1552             Time Calculation- OT    OT Start Time 0923  -RB      OT Stop Time 0948  -RB      OT Time Calculation (min) 25 min  -RB      Total Timed Code Minutes- OT 25 minute(s)  -RB      OT Received On 09/06/23  -RB      OT - Next Appointment 09/07/23  -RB         Timed Charges    47156 - OT Therapeutic Activity Minutes 15  -RB      65868 - OT Self Care/Mgmt Minutes 10  -RB         Total Minutes    Timed Charges Total Minutes 25  -RB       Total Minutes 25  -RB                User Key  (r) = Recorded By, (t) = Taken By, (c) = Cosigned By      Initials Name Provider Type    RB Jojo Thurman OT Occupational Therapist                  Therapy Charges for Today       Code Description Service Date Service Provider Modifiers Qty    81866918029 HC OT SELF CARE/MGMT/TRAIN EA 15 MIN 9/5/2023 Jojo Thurman OT GO 1    83615862171 HC OT EVAL MOD COMPLEXITY 2 9/5/2023 Jojo Thurman OT GO 1    47560166085 HC OT THERAPEUTIC ACT EA 15 MIN 9/6/2023 Jojo Thurman OT GO 1    03695943187 HC OT SELF CARE/MGMT/TRAIN EA 15 MIN 9/6/2023 Jojo Thurman OT GO 1                 Jojo Thurman OT  9/6/2023

## 2023-09-06 NOTE — CASE MANAGEMENT/SOCIAL WORK
Continued Stay Note  [facility]     Patient Name: Lennie Gomez  MRN: [id number]  Today's Date: 9/6/2023    Admit Date: 8/31/2023    Plan: Home w/ [facility]   Discharge Plan       Row Name 09/06/23 1230       Plan    Plan Home w/ [facility]    Plan Comments S/W Pt at bedside to advise that [facility] accepted.  Pt plans to d/c home with assistance of several family members, including dgtr/Blue & granddgtr/Kelly.  [facility] accepted and they will follow Pt when she discharges home........Hailey GERARD/BIRD CM                   Discharge Codes    No documentation.                 Expected Discharge Date and Time       Expected Discharge Date Expected Discharge Time    Sep 7, 2023               Hailey Ladd RN

## 2023-09-06 NOTE — PROGRESS NOTES
" LOS: 6 days   Patient Care Team:  Jacqui Patterson APRN as PCP - General (Nurse Practitioner)    Chief Complaint: post op    Subjective:  Symptoms:  She reports shortness of breath and cough.  No chest pain.    Diet:  Adequate intake.  No nausea or vomiting.    Activity level: Impaired due to weakness.    Pain:  She complains of pain that is mild.  Pain is well controlled.        Vital Signs  Temp:  [97.3 °F (36.3 °C)-99.6 °F (37.6 °C)] 99.6 °F (37.6 °C)  Heart Rate:  [] 108  Resp:  [17-20] 20  BP: (105-135)/(48-72) 125/53  Body mass index is 28.84 kg/m².    Intake/Output Summary (Last 24 hours) at 9/6/2023 0806  Last data filed at 9/6/2023 0644  Gross per 24 hour   Intake 480 ml   Output 950 ml   Net -470 ml     No intake/output data recorded.          09/04/23  0550 09/05/23  0633 09/06/23  0624   Weight: 71.4 kg (157 lb 8 oz) 71.3 kg (157 lb 3.2 oz) 71.5 kg (157 lb 11.2 oz)       Objective:  General Appearance:  Comfortable and in no acute distress.    Vital signs: (most recent): Blood pressure 125/53, pulse 108, temperature 99.6 °F (37.6 °C), temperature source Oral, resp. rate 20, height 157.5 cm (62.01\"), weight 71.5 kg (157 lb 11.2 oz), SpO2 94 %.  Vital signs are normal.  No fever.    Output: Producing urine and no stool output.    Lungs:  Normal effort and normal respiratory rate.  There are decreased breath sounds.    Heart: Tachycardia.  Regular rhythm.  (ST on tele monitor)  Abdomen: Abdomen is soft.  Bowel sounds are normal.     Extremities: There is dependent edema.    Pulses: Distal pulses are intact.    Neurological: Patient is alert and oriented to person, place and time.    Skin:  Warm and dry.  (Sternal incision clean and dry)        Results Review:        WBC WBC   Date Value Ref Range Status   09/05/2023 7.29 3.40 - 10.80 10*3/mm3 Final   09/04/2023 8.66 3.40 - 10.80 10*3/mm3 Final      HGB Hemoglobin   Date Value Ref Range Status   09/05/2023 10.2 (L) 12.0 - 15.9 g/dL Final "   09/04/2023 10.7 (L) 12.0 - 15.9 g/dL Final      HCT Hematocrit   Date Value Ref Range Status   09/05/2023 31.6 (L) 34.0 - 46.6 % Final   09/04/2023 32.6 (L) 34.0 - 46.6 % Final      Platelets Platelets   Date Value Ref Range Status   09/05/2023 155 140 - 450 10*3/mm3 Final   09/04/2023 128 (L) 140 - 450 10*3/mm3 Final        PT/INR:  No results found for: PROTIME/No results found for: INR    Sodium Sodium   Date Value Ref Range Status   09/06/2023 139 136 - 145 mmol/L Final   09/05/2023 134 (L) 136 - 145 mmol/L Final   09/04/2023 138 136 - 145 mmol/L Final      Potassium Potassium   Date Value Ref Range Status   09/06/2023 3.4 (L) 3.5 - 5.2 mmol/L Final   09/05/2023 3.7 3.5 - 5.2 mmol/L Final   09/05/2023 3.2 (L) 3.5 - 5.2 mmol/L Final   09/04/2023 3.7 3.5 - 5.2 mmol/L Final      Chloride Chloride   Date Value Ref Range Status   09/06/2023 99 98 - 107 mmol/L Final   09/05/2023 97 (L) 98 - 107 mmol/L Final   09/04/2023 99 98 - 107 mmol/L Final      Bicarbonate CO2   Date Value Ref Range Status   09/06/2023 30.0 (H) 22.0 - 29.0 mmol/L Final   09/05/2023 27.2 22.0 - 29.0 mmol/L Final   09/04/2023 26.0 22.0 - 29.0 mmol/L Final      BUN BUN   Date Value Ref Range Status   09/06/2023 9 8 - 23 mg/dL Final   09/05/2023 10 8 - 23 mg/dL Final   09/04/2023 11 8 - 23 mg/dL Final      Creatinine Creatinine   Date Value Ref Range Status   09/06/2023 0.55 (L) 0.57 - 1.00 mg/dL Final   09/05/2023 0.58 0.57 - 1.00 mg/dL Final   09/04/2023 0.62 0.57 - 1.00 mg/dL Final      Calcium Calcium   Date Value Ref Range Status   09/06/2023 9.3 8.6 - 10.5 mg/dL Final   09/05/2023 8.8 8.6 - 10.5 mg/dL Final   09/04/2023 9.3 8.6 - 10.5 mg/dL Final      Magnesium No results found for: MG       amiodarone, 200 mg, Oral, Q24H  aspirin, 81 mg, Oral, Daily  atorvastatin, 40 mg, Oral, Nightly  cholecalciferol, 1,000 Units, Oral, Daily  clopidogrel, 75 mg, Oral, Daily  docusate sodium, 100 mg, Oral, BID  enoxaparin, 40 mg, Subcutaneous,  Daily  furosemide, 40 mg, Oral, Daily  guaiFENesin, 1,200 mg, Oral, Q12H  insulin glargine, 25 Units, Subcutaneous, Daily  insulin lispro, 3-14 Units, Subcutaneous, 4x Daily AC & at Bedtime  insulin lispro, 7 Units, Subcutaneous, TID With Meals  metoprolol tartrate, 25 mg, Oral, Q12H  mupirocin, , Each Nare, BID  pantoprazole, 40 mg, Oral, QAM  polyethylene glycol, 17 g, Oral, Daily  potassium chloride, 20 mEq, Oral, Daily  potassium chloride ER, 40 mEq, Oral, Q4H  senna-docusate sodium, 2 tablet, Oral, Nightly  sodium chloride, 10 mL, Intravenous, Q12H      clevidipine, 2-32 mg/hr  dexmedetomidine, 0.2-1.5 mcg/kg/hr, Last Rate: Stopped (09/01/23 1330)  DOPamine, 2-20 mcg/kg/min  EPINEPHrine, 0.02-0.1 mcg/kg/min  milrinone, 0.25-0.375 mcg/kg/min  niCARdipine, 5-15 mg/hr, Last Rate: Stopped (09/01/23 1156)  nitroglycerin, 5-200 mcg/min  norepinephrine, 0.02-0.2 mcg/kg/min, Last Rate: Stopped (09/01/23 1640)  phenylephrine, 0.2-2 mcg/kg/min  propofol, 5-50 mcg/kg/min, Last Rate: Stopped (09/01/23 1205)  sodium chloride, 30 mL/hr, Last Rate: 30 mL/hr (09/01/23 1120)        Assessment & Plan  - severe multi-vessel CAD -s/p CABGx4 LIMA/RSVG- POD#5  - ICM--EF 42%; GDMT as tolerated  - hypertension  - hyperlipidemia--statin therapy  - DM II  - severe bilateral carotid stenosis; On plavix  - current tobacco use    Looks good this morning sitting up in the chair  On 3L NC--wean as able  Transitioned to oral diuretics. Will repeat CXR today  Mobilize/Aggressive pulmonary toilet-- continue mucinex and flutter valve. With thick brown secretions--will place on a course of doxycycline  Tachycardic and BP stable. Will increase beta blocker slightly  No BM since surgery--suppository ordered for this afternoon if unable to go  With impaired mobility--continue PT/OT. BAR to evaluate for possible placement  Continue routine care    HERVE Garza  09/06/23  08:06 EDT

## 2023-09-06 NOTE — CONSULTS
Diabetes Education    Patient Name:  Lennie Gomez  YOB: 1955  MRN: [id number]  Admit Date:  8/31/2023        Spoke to pt on phone to discuss her diabetes management. She states she has  meter at home and we discussed her testing 3-4 times day and recording these numbers for her health care providers. Pt is on Lantus at home but states she is not a fan of needles. We discussed that she will potentially be going home on mealtime insulin also and would she be able to do that at home?She states she would be willing to take the mealtime insulin.. She has appointment to F/U with her PCP in October and the plan is to have HH visit her at home.      Electronically signed by:  Whit Rodriguez RN  09/06/23 13:29 EDT

## 2023-09-06 NOTE — THERAPY TREATMENT NOTE
Patient Name: Lennie Gomez  : 1955    MRN: [id number]                              Today's Date: 2023       Admit Date: 2023    Visit Dx:     ICD-10-CM ICD-9-CM   1. S/P CABG (coronary artery bypass graft)  Z95.1 V45.81     Patient Active Problem List   Diagnosis    Preoperative clearance    Abnormal nuclear stress test    CAD, multiple vessel    Coronary atherosclerosis due to calcified coronary lesion (CODE)    CAD (coronary artery disease)    Type 2 diabetes mellitus with hyperglycemia    Hypertension, essential    Elevated cholesterol    Diabetic polyneuropathy associated with type 2 diabetes mellitus     Past Medical History:   Diagnosis Date    Arthritis     Coronary artery disease     Diabetes mellitus     Elevated cholesterol     Elevated cholesterol 2023    Hypertension     Myocardial infarction      Past Surgical History:   Procedure Laterality Date    CARDIAC CATHETERIZATION      CARDIAC CATHETERIZATION N/A 2023    Procedure: Left Heart Cath with possible coronary angioplasty;  Surgeon: Surendra Sprague MD;  Location: [facility] CATH INVASIVE LOCATION;  Service: Cardiology;  Laterality: N/A;     SECTION      CORONARY ARTERY BYPASS GRAFT N/A 2023    Procedure: SCOTT STERNOTOMY CORONARY ARTERY BYPASS GRAFT TIMES 4 USING LEFT INTERNAL MAMMARY ARTERY AND RIGHT GREATER SAPHENOUS VEIN GRAFT PER ENDOSCOPIC VEIN HARVESTING AND PRP;  Surgeon: Damir Aaron MD;  Location: [facility];  Service: Cardiothoracic;  Laterality: N/A;      General Information       Row Name 23 1039          Physical Therapy Time and Intention    Document Type therapy note (daily note)  -EJ     Mode of Treatment physical therapy  -EJ       Row Name 23 1039          General Information    Existing Precautions/Restrictions fall;sternal;cardiac  -EJ               User Key  (r) = Recorded By, (t) = Taken By, (c) = Cosigned By      Initials Name Provider Type    EJ  Yuli Goode, PT Physical Therapist                   Mobility       Row Name 09/06/23 1040          Bed Mobility    Supine-Sit [place] (Bed Mobility) not tested  -EJ     Sit-Supine [place] (Bed Mobility) verbal cues;standby assist;contact guard  -EJ     Assistive Device (Bed Mobility) bed rails  -EJ       Row Name 09/06/23 1040          Sit-Stand Transfer    Sit-Stand [place] (Transfers) verbal cues;standby assist;contact guard  -EJ     Assistive Device (Sit-Stand Transfers) --  HHA  -EJ       Row Name 09/06/23 1040          Gait/Stairs (Locomotion)    [place] Level (Gait) verbal cues;nonverbal cues (demo/gesture);contact guard  -EJ     Assistive Device (Gait) --  HHA  -EJ     Distance in Feet (Gait) 120  -EJ     Deviations/Abnormal Patterns (Gait) latoya decreased;stride length decreased  -EJ     Bilateral Gait Deviations forward flexed posture  -EJ     Comment, (Gait/Stairs) slow pace, but steady, no c/o SOA w activity  -EJ               User Key  (r) = Recorded By, (t) = Taken By, (c) = Cosigned By      Initials Name Provider Type    EJ Yuli Goode, PT Physical Therapist                   Obj/Interventions    No documentation.                  Goals/Plan    No documentation.                  Clinical Impression       Row Name 09/06/23 1041          Pain    Pretreatment Pain Rating 0/10 - no pain  -EJ     Posttreatment Pain Rating 0/10 - no pain  -EJ       Row Name 09/06/23 1041          Plan of Care Review    Plan of Care Reviewed With patient  -EJ     Progress improving  -EJ     Outcome Evaluation Pt agreeable to PT this am and showing progress w activity today. SHe is up in chair upon entry to room. Pt able to stand w SBA/CGA. She increased ambulation distance, ambulating approx 120 ft w CGA. No unsteadiness noted. Pt returned to bed at end of session w SBA. She is hopeful to return home at [place] w assist of family. Good progress today. Encouraged ambulation w [facility] staff as well.  Will continue to progress activity as tolerated.  -EJ       Row Name 09/06/23 1041          Positioning and Restraints    Pre-Treatment Position sitting in chair/recliner  -EJ     Post Treatment Position bed  -EJ     In Bed notified nsg;supine;call light within reach;encouraged to call for assist;exit alarm on  -EJ               User Key  (r) = Recorded By, (t) = Taken By, (c) = Cosigned By      Initials Name Provider Type    Yuli Lucas, PT Physical Therapist                   Outcome Measures       Row Name 09/06/23 1043          How much help from another person do you currently need...    Turning from your back to your side while in flat bed without using bedrails? 4  -EJ     Moving from lying on back to sitting on the side of a flat bed without bedrails? 3  -EJ     Moving to and from a bed to a chair (including a wheelchair)? 3  -EJ     Standing up from a chair using your arms (e.g., wheelchair, bedside chair)? 3  -EJ     Climbing 3-5 steps with a railing? 3  -EJ     To walk in hospital room? 3  -EJ     AM-PAC 6 Clicks Score (PT) 19  -EJ     Highest level of mobility 6 --> Walked 10 steps or more  -EJ               User Key  (r) = Recorded By, (t) = Taken By, (c) = Cosigned By      Initials Name Provider Type    Yuli Lucas, PT Physical Therapist                                 Physical Therapy Education       Title: PT OT SLP Therapies (In Progress)       Topic: Physical Therapy (Done)       Point: Mobility training (Done)       Learning Progress Summary             Patient Acceptance, TB,D,E, VU,NR by  at 9/4/2023 1034    Acceptance, E,TB,D, VU,NR by  at 9/3/2023 1527    Acceptance, E,TB,D, VU,NR by  at 9/2/2023 1344                         Point: Home exercise program (Done)       Learning Progress Summary             Patient Acceptance, TB,D,E, VU,NR by  at 9/4/2023 1034    Acceptance, E,TB,D, VU,NR by  at 9/3/2023 1527    Acceptance, E,TB,D, VU,NR by  at 9/2/2023 1344                          Point: Body mechanics (Done)       Learning Progress Summary             Patient Acceptance, TB,D,E, VU,NR by  at 9/4/2023 1034    Acceptance, E,TB,D, VU,NR by  at 9/3/2023 1527    Acceptance, E,TB,D, VU,NR by  at 9/2/2023 1344                         Point: Precautions (Done)       Learning Progress Summary             Patient Acceptance, TB,D,E, VU,NR by  at 9/4/2023 1034    Acceptance, E,TB,D, VU,NR by  at 9/3/2023 1527    Acceptance, E,TB,D, VU,NR by  at 9/2/2023 1344                                         User Key       Initials Effective Dates Name Provider Type [facility] 04/08/22 -  Shakila Atkinson, PT Physical Therapist PT                  PT Recommendation and Plan     Plan of Care Reviewed With: patient  Progress: improving  Outcome Evaluation: Pt agreeable to PT this am and showing progress w activity today. SHe is up in chair upon entry to room. Pt able to stand w SBA/CGA. She increased ambulation distance, ambulating approx 120 ft w CGA. No unsteadiness noted. Pt returned to bed at end of session w SBA. She is hopeful to return home at [place] w assist of family. Good progress today. Encouraged ambulation w [facility] staff as well. Will continue to progress activity as tolerated.     Time Calculation:         PT Charges       Row Name 09/06/23 1043             Time Calculation    Start Time 0923  -EJ      Stop Time 0935  -EJ      Time Calculation (min) 12 min  -EJ      PT Received On 09/06/23  -EJ      PT - Next Appointment 09/07/23  -EJ                User Key  (r) = Recorded By, (t) = Taken By, (c) = Cosigned By      Initials Name Provider Type    EJ Yuli Goode, PT Physical Therapist                  Therapy Charges for Today       Code Description Service Date Service Provider Modifiers Qty    14666712307 HC PT THER PROC EA 15 MIN 9/5/2023 Yuli Goode, PT GP 1    48132259166 HC PT THER PROC EA 15 MIN 9/6/2023 Yuli Goode, PT GP 1            PT  G-Codes  Outcome Measure Options: AM-PAC 6 Clicks Daily Activity (OT), Modified Early  AM-PAC 6 Clicks Score (PT): 19  AM-PAC 6 Clicks Score (OT): 16  Modified Early Scale: 4 - Moderately severe disability.  Unable to walk without assistance, and unable to attend to own bodily needs without assistance.  PT Discharge Summary  Anticipated Discharge Disposition (PT): home with assist, home with home health    Yuli Goode, PT  9/6/2023

## 2023-09-06 NOTE — PROGRESS NOTES
"DAILY PROGRESS NOTE  [facility]    Patient Identification:  Name: Lennie Gomez  Age: 68 y.o.  Sex: female  :  1955  MRN: [id number]         Primary Care Physician: Jacqui Patterson APRN    Subjective:  Interval History: She has no new complaints.    Objective:    Scheduled Meds:amiodarone, 200 mg, Oral, Q24H  aspirin, 81 mg, Oral, Daily  atorvastatin, 40 mg, Oral, Nightly  bisacodyl, 10 mg, Rectal, Once  cholecalciferol, 1,000 Units, Oral, Daily  clopidogrel, 75 mg, Oral, Daily  docusate sodium, 100 mg, Oral, BID  doxycycline, 100 mg, Oral, Q12H  enoxaparin, 40 mg, Subcutaneous, Daily  furosemide, 40 mg, Intravenous, Once  furosemide, 40 mg, Oral, Daily  guaiFENesin, 1,200 mg, Oral, Q12H  insulin glargine, 25 Units, Subcutaneous, Daily  insulin lispro, 3-14 Units, Subcutaneous, 4x Daily AC & at Bedtime  insulin lispro, 7 Units, Subcutaneous, TID With Meals  metoprolol tartrate, 25 mg, Oral, Q12H  mupirocin, , Each Nare, BID  pantoprazole, 40 mg, Oral, QAM  polyethylene glycol, 17 g, Oral, Daily  potassium chloride, 20 mEq, Oral, Daily  potassium chloride, 20 mEq, Oral, Once  senna-docusate sodium, 2 tablet, Oral, Nightly  sodium chloride, 10 mL, Intravenous, Q12H      Continuous Infusions:sodium chloride, 30 mL/hr, Last Rate: 30 mL/hr (23 1120)        Vital signs in last 24 hours:  Temp:  [98.6 °F (37 °C)-99.6 °F (37.6 °C)] 99 °F (37.2 °C)  Heart Rate:  [] 102  Resp:  [18-20] 18  BP: (110-135)/(48-72) 118/55    Intake/Output:    Intake/Output Summary (Last 24 hours) at 2023 1336  Last data filed at 2023 1200  Gross per 24 hour   Intake 1200 ml   Output 1350 ml   Net -150 ml       Exam:  /55 (BP Location: Right arm, Patient Position: Sitting)   Pulse 102   Temp 99 °F (37.2 °C) (Oral)   Resp 18   Ht 157.5 cm (62.01\")   Wt 71.5 kg (157 lb 11.2 oz)   SpO2 93%   BMI 28.84 kg/m²     General Appearance:    Alert, cooperative, no distress   Head:    " Normocephalic, without obvious abnormality, atraumatic   Eyes:       Throat:   Lips, tongue, gums normal   Neck:   Supple, symmetrical, trachea midline, no JVD   Lungs:     Clear to auscultation bilaterally, respirations unlabored   Chest Wall:    No tenderness or deformity    Heart:    Regular rate and rhythm, S1 and S2 normal, no murmur,no  Rub or gallop   Abdomen:     Soft, nontender, bowel sounds active, no masses, no organomegaly    Extremities:   Extremities normal, atraumatic, no cyanosis or edema   Pulses:      Skin:   Skin is warm and dry,  no rashes or palpable lesions   Neurologic:   no focal deficits noted      Lab Results (last 72 hours)       Procedure Component Value Units Date/Time    POC Glucose Once [[id number]]  (Abnormal) Collected: 09/06/23 1054    Specimen: Blood Updated: 09/06/23 1056     Glucose 175 mg/dL     Basic Metabolic Panel [[id number]]  (Abnormal) Collected: 09/06/23 0423    Specimen: Blood Updated: 09/06/23 0601     Glucose 118 mg/dL      BUN 9 mg/dL      Creatinine 0.55 mg/dL      Sodium 139 mmol/L      Potassium 3.4 mmol/L      Chloride 99 mmol/L      CO2 30.0 mmol/L      Calcium 9.3 mg/dL      BUN/Creatinine Ratio 16.4     Anion Gap 10.0 mmol/L      eGFR 100.6 mL/min/1.73     Narrative:      GFR Normal >60  Chronic Kidney Disease <60  Kidney Failure <15      POC Glucose Once [[id number]]  (Normal) Collected: 09/06/23 0548    Specimen: Blood Updated: 09/06/23 0549     Glucose 130 mg/dL     POC Glucose Once [[id number]]  (Abnormal) Collected: 09/05/23 2004    Specimen: Blood Updated: 09/05/23 2010     Glucose 183 mg/dL     POC Glucose Once [[id number]]  (Abnormal) Collected: 09/05/23 1606    Specimen: Blood Updated: 09/05/23 1609     Glucose 202 mg/dL     Potassium [[id number]]  (Normal) Collected: 09/05/23 1510    Specimen: Blood Updated: 09/05/23 1547     Potassium 3.7 mmol/L     POC Glucose Once [[id number]]  (Abnormal) Collected: 09/05/23 1109    Specimen: Blood Updated: 09/05/23 1111      Glucose 217 mg/dL     POC Glucose Once [[id number]]  (Abnormal) Collected: 09/05/23 0630    Specimen: Blood Updated: 09/05/23 0632     Glucose 215 mg/dL     Basic Metabolic Panel [[id number]]  (Abnormal) Collected: 09/05/23 0309    Specimen: Blood Updated: 09/05/23 0349     Glucose 215 mg/dL      BUN 10 mg/dL      Creatinine 0.58 mg/dL      Sodium 134 mmol/L      Potassium 3.2 mmol/L      Chloride 97 mmol/L      CO2 27.2 mmol/L      Calcium 8.8 mg/dL      BUN/Creatinine Ratio 17.2     Anion Gap 9.8 mmol/L      eGFR 99.3 mL/min/1.73     Narrative:      GFR Normal >60  Chronic Kidney Disease <60  Kidney Failure <15      CBC (No Diff) [[id number]]  (Abnormal) Collected: 09/05/23 0309    Specimen: Blood Updated: 09/05/23 0332     WBC 7.29 10*3/mm3      RBC 3.32 10*6/mm3      Hemoglobin 10.2 g/dL      Hematocrit 31.6 %      MCV 95.2 fL      MCH 30.7 pg      MCHC 32.3 g/dL      RDW 12.5 %      RDW-SD 43.7 fl      MPV 11.5 fL      Platelets 155 10*3/mm3     POC Glucose Once [[id number]]  (Abnormal) Collected: 09/04/23 2030    Specimen: Blood Updated: 09/04/23 2032     Glucose 246 mg/dL     POC Glucose Once [[id number]]  (Abnormal) Collected: 09/04/23 1545    Specimen: Blood Updated: 09/04/23 1550     Glucose 204 mg/dL     POC Glucose Once [[id number]]  (Abnormal) Collected: 09/04/23 1048    Specimen: Blood Updated: 09/04/23 1054     Glucose 191 mg/dL     POC Glucose Once [[id number]]  (Abnormal) Collected: 09/04/23 0613    Specimen: Blood Updated: 09/04/23 0614     Glucose 171 mg/dL     POC Glucose Once [[id number]]  (Abnormal) Collected: 09/04/23 0550    Specimen: Blood Updated: 09/04/23 0551     Glucose 178 mg/dL     Basic Metabolic Panel [[id number]]  (Abnormal) Collected: 09/04/23 0319    Specimen: Blood Updated: 09/04/23 0417     Glucose 186 mg/dL      BUN 11 mg/dL      Creatinine 0.62 mg/dL      Sodium 138 mmol/L      Potassium 3.7 mmol/L      Chloride 99 mmol/L      CO2 26.0 mmol/L      Calcium 9.3 mg/dL       BUN/Creatinine Ratio 17.7     Anion Gap 13.0 mmol/L      eGFR 97.7 mL/min/1.73     Narrative:      GFR Normal >60  Chronic Kidney Disease <60  Kidney Failure <15      Lipid Panel [[id number]]  (Abnormal) Collected: 09/04/23 0319    Specimen: Blood Updated: 09/04/23 0404     Total Cholesterol 69 mg/dL      Triglycerides 108 mg/dL      HDL Cholesterol 24 mg/dL      LDL Cholesterol  24 mg/dL      VLDL Cholesterol 21 mg/dL      LDL/HDL Ratio 0.98    Narrative:      Cholesterol Reference Ranges  (U.S. Department of Health and Human Services ATP III Classifications)    Desirable          <200 mg/dL  Borderline High    200-239 mg/dL  High Risk          >240 mg/dL      Triglyceride Reference Ranges  (U.S. Department of Health and Human Services ATP III Classifications)    Normal           <150 mg/dL  Borderline High  150-199 mg/dL  High             200-499 mg/dL  Very High        >500 mg/dL    HDL Reference Ranges  (U.S. Department of Health and Human Services ATP III Classifications)    Low     <40 mg/dl (major risk factor for CHD)  High    >60 mg/dl ('negative' risk factor for CHD)        LDL Reference Ranges  (U.S. Department of Health and Human Services ATP III Classifications)    Optimal          <100 mg/dL  Near Optimal     100-129 mg/dL  Borderline High  130-159 mg/dL  High             160-189 mg/dL  Very High        >189 mg/dL    CBC (No Diff) [[id number]]  (Abnormal) Collected: 09/04/23 0319    Specimen: Blood Updated: 09/04/23 0353     WBC 8.66 10*3/mm3      RBC 3.50 10*6/mm3      Hemoglobin 10.7 g/dL      Hematocrit 32.6 %      MCV 93.1 fL      MCH 30.6 pg      MCHC 32.8 g/dL      RDW 12.2 %      RDW-SD 41.5 fl      MPV 11.1 fL      Platelets 128 10*3/mm3     POC Glucose Once [[id number]]  (Abnormal) Collected: 09/04/23 0040    Specimen: Blood Updated: 09/04/23 0041     Glucose 194 mg/dL     POC Glucose Once [[id number]]  (Abnormal) Collected: 09/03/23 2035    Specimen: Blood Updated: 09/03/23 2037     Glucose 209  mg/dL     POC Glucose Once [[id number]]  (Abnormal) Collected: 09/03/23 1610    Specimen: Blood Updated: 09/03/23 1612     Glucose 218 mg/dL           Data Review:  Results from last 7 days   Lab Units 09/06/23  0423 09/05/23  1510 09/05/23  0309 09/04/23  0319   SODIUM mmol/L 139  --  134* 138   POTASSIUM mmol/L 3.4* 3.7 3.2* 3.7   CHLORIDE mmol/L 99  --  97* 99   CO2 mmol/L 30.0*  --  27.2 26.0   BUN mg/dL 9  --  10 11   CREATININE mg/dL 0.55*  --  0.58 0.62   GLUCOSE mg/dL 118*  --  215* 186*   CALCIUM mg/dL 9.3  --  8.8 9.3     Results from last 7 days   Lab Units 09/05/23  0309 09/04/23  0319 09/03/23  0356   WBC 10*3/mm3 7.29 8.66 8.52   HEMOGLOBIN g/dL 10.2* 10.7* 9.8*   HEMATOCRIT % 31.6* 32.6* 29.6*   PLATELETS 10*3/mm3 155 128* 104*         Results from last 7 days   Lab Units 08/31/23  1132   HEMOGLOBIN A1C % 11.40*     No results found for: TROPONINT  Results from last 7 days   Lab Units 09/04/23  0319   CHOLESTEROL mg/dL 69   TRIGLYCERIDES mg/dL 108   HDL CHOL mg/dL 24*   LDL CHOL mg/dL 24     Results from last 7 days   Lab Units 08/31/23  1132   ALK PHOS U/L 111   BILIRUBIN mg/dL 0.4   ALT (SGPT) U/L 12   AST (SGOT) U/L 13         Results from last 7 days   Lab Units 08/31/23  1132   HEMOGLOBIN A1C % 11.40*     Glucose   Date/Time Value Ref Range Status   09/06/2023 1054 175 (H) 70 - 130 mg/dL Final   09/06/2023 0548 130 70 - 130 mg/dL Final   09/05/2023 2004 183 (H) 70 - 130 mg/dL Final   09/05/2023 1606 202 (H) 70 - 130 mg/dL Final   09/05/2023 1109 217 (H) 70 - 130 mg/dL Final   09/05/2023 0630 215 (H) 70 - 130 mg/dL Final   09/04/2023 2030 246 (H) 70 - 130 mg/dL Final   09/04/2023 1545 204 (H) 70 - 130 mg/dL Final     Results from last 7 days   Lab Units 09/02/23  0257 09/01/23  1026 08/31/23  1132   INR  1.21* 1.32* 1.03       Past Medical History:   Diagnosis Date    Arthritis     Coronary artery disease 1996    Diabetes mellitus     Elevated cholesterol     Elevated cholesterol 9/2/2023     Hypertension 1996    Myocardial infarction 1996       Assessment:  Active Hospital Problems    Diagnosis  POA    **CAD (coronary artery disease) [I25.10]  Yes    Type 2 diabetes mellitus with hyperglycemia [E11.65]  Yes    Hypertension, essential [I10]  Yes    Elevated cholesterol [E78.00]  Yes    Diabetic polyneuropathy associated with type 2 diabetes mellitus [E11.42]  Yes    CAD, multiple vessel [I25.10]  Clinically Undetermined    Coronary atherosclerosis due to calcified coronary lesion (CODE) [I25.84]  Yes      Resolved Hospital Problems   No resolved problems to display.       Plan:  Postop care.  Glycemic control.  Continue with current Rx.  Follow-up lab.  DC planning.  Probably home with home health.  Still requiring oxygen.  Follow-up chest x-ray report noted.    Lazaro Diallo MD  9/6/2023  13:36 EDT

## 2023-09-06 NOTE — PLAN OF CARE
Goal Outcome Evaluation:              Outcome Evaluation: Speech-language assessment was completed d/t suspicion of a small left motor strip infarct. Functional expressive/receptive language and reading skills demonstrated. SLP to sign off.

## 2023-09-06 NOTE — CONSULTS
Met with patient, discussed benefits of cardiac rehab. Provided phase II information along with the contact information for cardiac rehab  at [facility]. Requested for referral to be sent. Explained if receiving home health would not be able to attend cardiac rehab until finished with home health.

## 2023-09-06 NOTE — PLAN OF CARE
The pt participated in OT/PT this morning. She completed bed mobility with SBA. Min A for LBD due to R hand coordination deficits. She stood and mobilized with SBA/CGA in the hallway around the unit. No LOB, slow and steady pacing. She returned to her room where fine motor coordination exercises were completed - theraputty, elevated  and weighted foam pieces were provided. She reports feeling as her R side weakness continues to improve. She declines rehab and plans on d/c home with her familys assist. HH vs. OP therapy recommended-  discussed a CHT focus therapist to address her RUE deficits.     Anticipated Discharge Disposition (OT): home with 24/7 care, home with home health

## 2023-09-06 NOTE — PLAN OF CARE
Goal Outcome Evaluation:  Plan of Care Reviewed With: patient           Outcome Evaluation: Patient s/p CABG x4 POD 5 with no c/o this pm. Patient b/p 110-130's/40-70's HR 's remains in SR. Patient ambulated to bathroom with x1 assist.  and 130. Will continue to monitor and await discharge plans.

## 2023-09-06 NOTE — PLAN OF CARE
Goal Outcome Evaluation:  Plan of Care Reviewed With: patient        Progress: improving  Outcome Evaluation: Pt agreeable to PT this am and showing progress w activity today. SHe is up in chair upon entry to room. Pt able to stand w SBA/CGA. She increased ambulation distance, ambulating approx 120 ft w CGA. No unsteadiness noted. Pt returned to bed at end of session w SBA. She is hopeful to return home at [place] w assist of family. Good progress today. Encouraged ambulation w [facility] staff as well. Will continue to progress activity as tolerated.      Anticipated Discharge Disposition (PT): home with assist, home with home health

## 2023-09-06 NOTE — PLAN OF CARE
Goal Outcome Evaluation:  Plan of Care Reviewed With: patient        Progress: improving  Outcome Evaluation: Unable to wean off of 2L today. Overnight ordered for tonight. Possible d/c tomorrow. No BM yet. Miralax, suppository, and prune juice given. Passing gas. No wires or tubes.

## 2023-09-06 NOTE — THERAPY EVALUATION
Acute Care - Speech Language Pathology Initial Evaluation  [facility]     Patient Name: Lennie Gomez  : 1955  MRN: [id number]  Today's Date: 2023               Admit Date: 2023     Visit Dx:    ICD-10-CM ICD-9-CM   1. S/P CABG (coronary artery bypass graft)  Z95.1 V45.81     Patient Active Problem List   Diagnosis    Preoperative clearance    Abnormal nuclear stress test    CAD, multiple vessel    Coronary atherosclerosis due to calcified coronary lesion (CODE)    CAD (coronary artery disease)    Type 2 diabetes mellitus with hyperglycemia    Hypertension, essential    Elevated cholesterol    Diabetic polyneuropathy associated with type 2 diabetes mellitus     Past Medical History:   Diagnosis Date    Arthritis     Coronary artery disease     Diabetes mellitus     Elevated cholesterol     Elevated cholesterol 2023    Hypertension     Myocardial infarction      Past Surgical History:   Procedure Laterality Date    CARDIAC CATHETERIZATION      CARDIAC CATHETERIZATION N/A 2023    Procedure: Left Heart Cath with possible coronary angioplasty;  Surgeon: Surendra Sprague MD;  Location: [facility] CATH INVASIVE LOCATION;  Service: Cardiology;  Laterality: N/A;     SECTION      CORONARY ARTERY BYPASS GRAFT N/A 2023    Procedure: SCOTT STERNOTOMY CORONARY ARTERY BYPASS GRAFT TIMES 4 USING LEFT INTERNAL MAMMARY ARTERY AND RIGHT GREATER SAPHENOUS VEIN GRAFT PER ENDOSCOPIC VEIN HARVESTING AND PRP;  Surgeon: Damir Aaron MD;  Location: [facility];  Service: Cardiothoracic;  Laterality: N/A;       SLP Recommendation and Plan  SLP Diagnosis: functional speech/language skills (23 1400)                                                     Outcome Evaluation: Speech-language assessment was completed d/t suspicion of a small left motor strip infarct. Functional expressive/receptive (23 1359)      SLP EVALUATION (last 72 hours)       SLP SLC Evaluation        Row Name 09/06/23 1400       Communication Assessment/Intervention    Document Type evaluation  -    Patient Effort excellent  -       General Information    Patient Profile Reviewed yes  -    Pertinent History Of Current Problem Suspected L motor strip stroke  -    Precautions/Limitations, Vision WFL;for purposes of eval  -    Precautions/Limitations, Hearing WFL;for purposes of eval  -    Patient Level of Education Former   -    Prior Level of Function-Communication WFL  -    Plans/Goals Discussed with patient  -    Barriers to Rehab none identified  -    Patient's Goals for Discharge patient did not state  -       Pain    Additional Documentation Pain Scale: FACES Pre/Post-Treatment (Group)  -       Pain Scale: FACES Pre/Post-Treatment    Pain: FACES Scale, Pretreatment 0-->no hurt  -       Comprehension Assessment/Intervention    Comprehension Assessment/Intervention Auditory Comprehension  -       Auditory Comprehension Assessment/Intervention    Auditory Comprehension (Communication) [facility]  -    Able to Identify Objects/Pictures (Communication) body part;familiar objects;pictures of common objects;L  -    Answers Questions (Communication) yes/no; questions;personal;simple;complex;abstract;[facility]  -    Able to Follow Commands (Communication) 1-step;2-step;3-step;[facility]  -    Narrative Discourse simple paragraph level;L  -       Expression Assessment/Intervention    Expression Assessment/Intervention verbal expression  -       Verbal Expression Assessment/Intervention    Verbal Expression [facility]  -    Automatic Speech (Communication) counting 1-20  -    Repetition sounds;words;phrases;sentences;[facility]  -    Phrase Completion automatic/predictable;[facility]  -    Responsive Naming simple;[facility]  -    Confrontational Naming high frequency;[facility]  -    Spontaneous/Functional Words simple;[facility]  -    Sentence Formulation simple;[facility]  -    Conversational Discourse/Fluency [facility]  -        SLP Evaluation Clinical Impressions    SLP Diagnosis functional speech/language skills  -              User Key  (r) = Recorded By, (t) = Taken By, (c) = Cosigned By      Initials Name Effective Dates    Malka Tucker MS CCC-SLP 06/16/21 -                        EDUCATION  The patient has been educated in the following areas:     Communication Impairment.                      Time Calculation:      Time Calculation- SLP       Row Name 09/06/23 1438             Time Calculation- SLP    SLP Start Time 1330  -      SLP Received On 09/06/23  -         Untimed Charges    43787-QW Eval Speech and Production w/ Language Minutes 60  -         Total Minutes    Untimed Charges Total Minutes 60  -       Total Minutes 60  -SH                User Key  (r) = Recorded By, (t) = Taken By, (c) = Cosigned By      Initials Name Provider Type    Malka Tucker MS CCC-SLP Speech and Language Pathologist                    Therapy Charges for Today       Code Description Service Date Service Provider Modifiers Qty    94766159524 HC ST EVAL SPEECH AND PROD W LANG  4 9/6/2023 Malka Rodriguez MS CCC-SLP GN 1                       MS ADELA Cabral  9/6/2023

## 2023-09-07 ENCOUNTER — APPOINTMENT (OUTPATIENT)
Dept: GENERAL RADIOLOGY | Facility: HOSPITAL | Age: 68
DRG: 236 | End: 2023-09-07
Payer: MEDICARE

## 2023-09-07 LAB
ANION GAP SERPL CALCULATED.3IONS-SCNC: 14 MMOL/L (ref 5–15)
BUN SERPL-MCNC: 11 MG/DL (ref 8–23)
BUN/CREAT SERPL: 15.7 (ref 7–25)
CALCIUM SPEC-SCNC: 9.5 MG/DL (ref 8.6–10.5)
CHLORIDE SERPL-SCNC: 98 MMOL/L (ref 98–107)
CO2 SERPL-SCNC: 27 MMOL/L (ref 22–29)
CREAT SERPL-MCNC: 0.7 MG/DL (ref 0.57–1)
EGFRCR SERPLBLD CKD-EPI 2021: 94.9 ML/MIN/1.73
GLUCOSE BLDC GLUCOMTR-MCNC: 152 MG/DL (ref 70–130)
GLUCOSE BLDC GLUCOMTR-MCNC: 214 MG/DL (ref 70–130)
GLUCOSE BLDC GLUCOMTR-MCNC: 222 MG/DL (ref 70–130)
GLUCOSE BLDC GLUCOMTR-MCNC: 279 MG/DL (ref 70–130)
GLUCOSE SERPL-MCNC: 130 MG/DL (ref 65–99)
MAGNESIUM SERPL-MCNC: 3.5 MG/DL (ref 1.6–2.4)
POTASSIUM SERPL-SCNC: 3.8 MMOL/L (ref 3.5–5.2)
SODIUM SERPL-SCNC: 139 MMOL/L (ref 136–145)

## 2023-09-07 PROCEDURE — 93005 ELECTROCARDIOGRAM TRACING: CPT | Performed by: THORACIC SURGERY (CARDIOTHORACIC VASCULAR SURGERY)

## 2023-09-07 PROCEDURE — 25010000002 AMIODARONE IN DEXTROSE 5% 150-4.21 MG/100ML-% SOLUTION: Performed by: NURSE PRACTITIONER

## 2023-09-07 PROCEDURE — 25010000002 ENOXAPARIN PER 10 MG: Performed by: NURSE PRACTITIONER

## 2023-09-07 PROCEDURE — 93010 ELECTROCARDIOGRAM REPORT: CPT | Performed by: STUDENT IN AN ORGANIZED HEALTH CARE EDUCATION/TRAINING PROGRAM

## 2023-09-07 PROCEDURE — 97535 SELF CARE MNGMENT TRAINING: CPT

## 2023-09-07 PROCEDURE — 97530 THERAPEUTIC ACTIVITIES: CPT

## 2023-09-07 PROCEDURE — 94799 UNLISTED PULMONARY SVC/PX: CPT

## 2023-09-07 PROCEDURE — 83735 ASSAY OF MAGNESIUM: CPT | Performed by: NURSE PRACTITIONER

## 2023-09-07 PROCEDURE — 82948 REAGENT STRIP/BLOOD GLUCOSE: CPT

## 2023-09-07 PROCEDURE — 25010000002 FUROSEMIDE PER 20 MG: Performed by: NURSE PRACTITIONER

## 2023-09-07 PROCEDURE — 25010000002 AMIODARONE IN DEXTROSE 5% 360-4.14 MG/200ML-% SOLUTION: Performed by: NURSE PRACTITIONER

## 2023-09-07 PROCEDURE — 80048 BASIC METABOLIC PNL TOTAL CA: CPT | Performed by: NURSE PRACTITIONER

## 2023-09-07 PROCEDURE — 25010000002 MAGNESIUM SULFATE IN D5W 1G/100ML (PREMIX) 1-5 GM/100ML-% SOLUTION: Performed by: NURSE PRACTITIONER

## 2023-09-07 PROCEDURE — 97110 THERAPEUTIC EXERCISES: CPT

## 2023-09-07 PROCEDURE — 99024 POSTOP FOLLOW-UP VISIT: CPT | Performed by: THORACIC SURGERY (CARDIOTHORACIC VASCULAR SURGERY)

## 2023-09-07 PROCEDURE — 63710000001 INSULIN LISPRO (HUMAN) PER 5 UNITS: Performed by: INTERNAL MEDICINE

## 2023-09-07 PROCEDURE — 63710000001 INSULIN GLARGINE PER 5 UNITS: Performed by: INTERNAL MEDICINE

## 2023-09-07 PROCEDURE — 71045 X-RAY EXAM CHEST 1 VIEW: CPT

## 2023-09-07 RX ORDER — MAGNESIUM SULFATE 1 G/100ML
1 INJECTION INTRAVENOUS ONCE
Status: COMPLETED | OUTPATIENT
Start: 2023-09-07 | End: 2023-09-07

## 2023-09-07 RX ORDER — POTASSIUM CHLORIDE 750 MG/1
20 TABLET, FILM COATED, EXTENDED RELEASE ORAL 2 TIMES DAILY WITH MEALS
Status: DISCONTINUED | OUTPATIENT
Start: 2023-09-07 | End: 2023-09-08 | Stop reason: HOSPADM

## 2023-09-07 RX ORDER — METOPROLOL SUCCINATE 25 MG/1
12.5 TABLET, EXTENDED RELEASE ORAL EVERY 12 HOURS SCHEDULED
Status: DISCONTINUED | OUTPATIENT
Start: 2023-09-07 | End: 2023-09-08 | Stop reason: HOSPADM

## 2023-09-07 RX ORDER — POTASSIUM CHLORIDE 29.8 MG/ML
20 INJECTION INTRAVENOUS ONCE
Status: CANCELLED | OUTPATIENT
Start: 2023-09-07 | End: 2023-09-07

## 2023-09-07 RX ORDER — FUROSEMIDE 10 MG/ML
40 INJECTION INTRAMUSCULAR; INTRAVENOUS
Status: COMPLETED | OUTPATIENT
Start: 2023-09-07 | End: 2023-09-07

## 2023-09-07 RX ADMIN — ENOXAPARIN SODIUM 40 MG: 100 INJECTION SUBCUTANEOUS at 17:50

## 2023-09-07 RX ADMIN — GUAIFENESIN 1200 MG: 600 TABLET, EXTENDED RELEASE ORAL at 08:53

## 2023-09-07 RX ADMIN — INSULIN GLARGINE 25 UNITS: 100 INJECTION, SOLUTION SUBCUTANEOUS at 09:07

## 2023-09-07 RX ADMIN — Medication 10 ML: at 20:17

## 2023-09-07 RX ADMIN — INSULIN LISPRO 5 UNITS: 100 INJECTION, SOLUTION INTRAVENOUS; SUBCUTANEOUS at 21:03

## 2023-09-07 RX ADMIN — DOCUSATE SODIUM 100 MG: 100 CAPSULE, LIQUID FILLED ORAL at 20:16

## 2023-09-07 RX ADMIN — POTASSIUM CHLORIDE 20 MEQ: 750 TABLET, EXTENDED RELEASE ORAL at 17:49

## 2023-09-07 RX ADMIN — METOPROLOL TARTRATE 25 MG: 25 TABLET, FILM COATED ORAL at 08:54

## 2023-09-07 RX ADMIN — PANTOPRAZOLE SODIUM 40 MG: 40 TABLET, DELAYED RELEASE ORAL at 05:58

## 2023-09-07 RX ADMIN — METOPROLOL SUCCINATE 12.5 MG: 25 TABLET, EXTENDED RELEASE ORAL at 20:16

## 2023-09-07 RX ADMIN — AMIODARONE HYDROCHLORIDE 0.5 MG/MIN: 1.8 INJECTION, SOLUTION INTRAVENOUS at 18:47

## 2023-09-07 RX ADMIN — Medication 10 ML: at 08:56

## 2023-09-07 RX ADMIN — DOXYCYCLINE 100 MG: 100 CAPSULE ORAL at 20:16

## 2023-09-07 RX ADMIN — SENNOSIDES AND DOCUSATE SODIUM 2 TABLET: 50; 8.6 TABLET ORAL at 20:16

## 2023-09-07 RX ADMIN — ATORVASTATIN CALCIUM 40 MG: 20 TABLET, FILM COATED ORAL at 20:16

## 2023-09-07 RX ADMIN — INSULIN LISPRO 8 UNITS: 100 INJECTION, SOLUTION INTRAVENOUS; SUBCUTANEOUS at 11:36

## 2023-09-07 RX ADMIN — INSULIN LISPRO 7 UNITS: 100 INJECTION, SOLUTION INTRAVENOUS; SUBCUTANEOUS at 08:55

## 2023-09-07 RX ADMIN — Medication 1000 UNITS: at 08:54

## 2023-09-07 RX ADMIN — INSULIN LISPRO 5 UNITS: 100 INJECTION, SOLUTION INTRAVENOUS; SUBCUTANEOUS at 17:50

## 2023-09-07 RX ADMIN — PANTOPRAZOLE SODIUM 40 MG: 40 TABLET, DELAYED RELEASE ORAL at 06:00

## 2023-09-07 RX ADMIN — FUROSEMIDE 40 MG: 10 INJECTION, SOLUTION INTRAMUSCULAR; INTRAVENOUS at 08:54

## 2023-09-07 RX ADMIN — INSULIN LISPRO 7 UNITS: 100 INJECTION, SOLUTION INTRAVENOUS; SUBCUTANEOUS at 17:50

## 2023-09-07 RX ADMIN — AMIODARONE HYDROCHLORIDE 200 MG: 200 TABLET ORAL at 08:53

## 2023-09-07 RX ADMIN — ASPIRIN 81 MG: 81 TABLET, COATED ORAL at 08:53

## 2023-09-07 RX ADMIN — MAGNESIUM SULFATE HEPTAHYDRATE 1 G: 1 INJECTION, SOLUTION INTRAVENOUS at 15:29

## 2023-09-07 RX ADMIN — CLOPIDOGREL BISULFATE 75 MG: 75 TABLET, FILM COATED ORAL at 08:54

## 2023-09-07 RX ADMIN — AMIODARONE HYDROCHLORIDE 150 MG: 1.5 INJECTION, SOLUTION INTRAVENOUS at 10:27

## 2023-09-07 RX ADMIN — INSULIN LISPRO 7 UNITS: 100 INJECTION, SOLUTION INTRAVENOUS; SUBCUTANEOUS at 11:35

## 2023-09-07 RX ADMIN — INSULIN LISPRO 3 UNITS: 100 INJECTION, SOLUTION INTRAVENOUS; SUBCUTANEOUS at 08:55

## 2023-09-07 RX ADMIN — DOXYCYCLINE 100 MG: 100 CAPSULE ORAL at 08:54

## 2023-09-07 RX ADMIN — GUAIFENESIN 1200 MG: 600 TABLET, EXTENDED RELEASE ORAL at 20:16

## 2023-09-07 RX ADMIN — AMIODARONE HYDROCHLORIDE 1 MG/MIN: 1.8 INJECTION, SOLUTION INTRAVENOUS at 11:35

## 2023-09-07 RX ADMIN — FUROSEMIDE 40 MG: 10 INJECTION, SOLUTION INTRAMUSCULAR; INTRAVENOUS at 17:50

## 2023-09-07 NOTE — PROGRESS NOTES
" LOS: 7 days   Patient Care Team:  Jacqui Patterson APRN as PCP - General (Nurse Practitioner)    Chief Complaint: post op    Subjective:  Symptoms:  She reports shortness of breath and cough.  No chest pain.    Diet:  Adequate intake.  No nausea or vomiting.    Activity level: Impaired due to weakness.    Pain:  She complains of pain that is mild.  Pain is well controlled.        Vital Signs  Temp:  [97.6 °F (36.4 °C)-99.2 °F (37.3 °C)] 98.5 °F (36.9 °C)  Heart Rate:  [] 78  Resp:  [18] 18  BP: (118-145)/(55-66) 123/62  Body mass index is 28.18 kg/m².    Intake/Output Summary (Last 24 hours) at 9/7/2023 0808  Last data filed at 9/6/2023 1643  Gross per 24 hour   Intake 960 ml   Output 2300 ml   Net -1340 ml       No intake/output data recorded.          09/05/23  0633 09/06/23  0624 09/07/23  0712   Weight: 71.3 kg (157 lb 3.2 oz) 71.5 kg (157 lb 11.2 oz) 69.9 kg (154 lb 1.6 oz)       Objective:  General Appearance:  Comfortable and in no acute distress.    Vital signs: (most recent): Blood pressure 123/62, pulse 78, temperature 98.5 °F (36.9 °C), temperature source Oral, resp. rate 18, height 157.5 cm (62.01\"), weight 69.9 kg (154 lb 1.6 oz), SpO2 92 %.  Vital signs are normal.  No fever.    Output: Producing urine and producing stool.    Lungs:  Normal effort and normal respiratory rate.  There are rales and decreased breath sounds.    Heart: Normal rate.  Regular rhythm.  (SR on tele monitor)  Abdomen: Abdomen is soft.  Bowel sounds are normal.     Extremities: There is no dependent edema.    Pulses: Distal pulses are intact.    Neurological: Patient is alert and oriented to person, place and time.    Skin:  Warm and dry.  (Sternal incision clean and dry)        Results Review:        WBC WBC   Date Value Ref Range Status   09/05/2023 7.29 3.40 - 10.80 10*3/mm3 Final      HGB Hemoglobin   Date Value Ref Range Status   09/05/2023 10.2 (L) 12.0 - 15.9 g/dL Final      HCT Hematocrit   Date Value Ref " Range Status   09/05/2023 31.6 (L) 34.0 - 46.6 % Final      Platelets Platelets   Date Value Ref Range Status   09/05/2023 155 140 - 450 10*3/mm3 Final        PT/INR:  No results found for: PROTIME/No results found for: INR    Sodium Sodium   Date Value Ref Range Status   09/07/2023 139 136 - 145 mmol/L Final   09/06/2023 139 136 - 145 mmol/L Final   09/05/2023 134 (L) 136 - 145 mmol/L Final      Potassium Potassium   Date Value Ref Range Status   09/07/2023 3.8 3.5 - 5.2 mmol/L Final   09/06/2023 4.2 3.5 - 5.2 mmol/L Final   09/06/2023 3.4 (L) 3.5 - 5.2 mmol/L Final   09/05/2023 3.7 3.5 - 5.2 mmol/L Final   09/05/2023 3.2 (L) 3.5 - 5.2 mmol/L Final      Chloride Chloride   Date Value Ref Range Status   09/07/2023 98 98 - 107 mmol/L Final   09/06/2023 99 98 - 107 mmol/L Final   09/05/2023 97 (L) 98 - 107 mmol/L Final      Bicarbonate CO2   Date Value Ref Range Status   09/07/2023 27.0 22.0 - 29.0 mmol/L Final   09/06/2023 30.0 (H) 22.0 - 29.0 mmol/L Final   09/05/2023 27.2 22.0 - 29.0 mmol/L Final      BUN BUN   Date Value Ref Range Status   09/07/2023 11 8 - 23 mg/dL Final   09/06/2023 9 8 - 23 mg/dL Final   09/05/2023 10 8 - 23 mg/dL Final      Creatinine Creatinine   Date Value Ref Range Status   09/07/2023 0.70 0.57 - 1.00 mg/dL Final   09/06/2023 0.55 (L) 0.57 - 1.00 mg/dL Final   09/05/2023 0.58 0.57 - 1.00 mg/dL Final      Calcium Calcium   Date Value Ref Range Status   09/07/2023 9.5 8.6 - 10.5 mg/dL Final   09/06/2023 9.3 8.6 - 10.5 mg/dL Final   09/05/2023 8.8 8.6 - 10.5 mg/dL Final      Magnesium No results found for: MG       amiodarone, 200 mg, Oral, Q24H  aspirin, 81 mg, Oral, Daily  atorvastatin, 40 mg, Oral, Nightly  cholecalciferol, 1,000 Units, Oral, Daily  clopidogrel, 75 mg, Oral, Daily  docusate sodium, 100 mg, Oral, BID  doxycycline, 100 mg, Oral, Q12H  enoxaparin, 40 mg, Subcutaneous, Daily  furosemide, 40 mg, Oral, Daily  guaiFENesin, 1,200 mg, Oral, Q12H  insulin glargine, 25 Units,  Subcutaneous, Daily  insulin lispro, 3-14 Units, Subcutaneous, 4x Daily AC & at Bedtime  insulin lispro, 7 Units, Subcutaneous, TID With Meals  metoprolol tartrate, 25 mg, Oral, Q12H  mupirocin, , Each Nare, BID  pantoprazole, 40 mg, Oral, QAM  polyethylene glycol, 17 g, Oral, Daily  potassium chloride, 20 mEq, Oral, Daily  senna-docusate sodium, 2 tablet, Oral, Nightly  sodium chloride, 10 mL, Intravenous, Q12H      sodium chloride, 30 mL/hr, Last Rate: 30 mL/hr (09/01/23 1120)        Assessment & Plan  - severe multi-vessel CAD -s/p CABGx4 LIMA/RSVG- POD#6  - ICM--EF 42%; GDMT as tolerated  - hypertension  - hyperlipidemia--statin therapy  - DM II  - severe bilateral carotid stenosis; On plavix  - current tobacco use  - post op anemia--expected acute blood loss    Looks good this morning sitting up in the chair  On 2L NC--wean as able. Overnight oximetry with over 4 hours of desaturation below 89%. Will need walking oximetry prior to discharge  Morning CXR with worsening congestion/effusions.IV diurese today, and repeat PA and lateral in the morning. Replete electrolytes  Mobilize/Aggressive pulmonary toilet-- continue mucinex and flutter valve. With thick brown secretions--continue doxycycline  HR/BP stable. Transition to Toprol given cardiomyopathy  Mobility is greatly improved. Plan for home with home health, hopefully tomorrow pending progress  Continue routine care    HERVE Garza  09/07/23  08:08 EDT

## 2023-09-07 NOTE — THERAPY TREATMENT NOTE
Patient Name: Lennie Gomez  : 1955    MRN: [id number]                              Today's Date: 2023       Admit Date: 2023    Visit Dx:     ICD-10-CM ICD-9-CM   1. S/P CABG (coronary artery bypass graft)  Z95.1 V45.81     Patient Active Problem List   Diagnosis    Preoperative clearance    Abnormal nuclear stress test    CAD, multiple vessel    Coronary atherosclerosis due to calcified coronary lesion (CODE)    CAD (coronary artery disease)    Type 2 diabetes mellitus with hyperglycemia    Hypertension, essential    Elevated cholesterol    Diabetic polyneuropathy associated with type 2 diabetes mellitus     Past Medical History:   Diagnosis Date    Arthritis     Coronary artery disease     Diabetes mellitus     Elevated cholesterol     Elevated cholesterol 2023    Hypertension     Myocardial infarction      Past Surgical History:   Procedure Laterality Date    CARDIAC CATHETERIZATION      CARDIAC CATHETERIZATION N/A 2023    Procedure: Left Heart Cath with possible coronary angioplasty;  Surgeon: Surendra Sprague MD;  Location: [facility] CATH INVASIVE LOCATION;  Service: Cardiology;  Laterality: N/A;     SECTION      CORONARY ARTERY BYPASS GRAFT N/A 2023    Procedure: SCOTT STERNOTOMY CORONARY ARTERY BYPASS GRAFT TIMES 4 USING LEFT INTERNAL MAMMARY ARTERY AND RIGHT GREATER SAPHENOUS VEIN GRAFT PER ENDOSCOPIC VEIN HARVESTING AND PRP;  Surgeon: Damir Aaron MD;  Location: [facility];  Service: Cardiothoracic;  Laterality: N/A;      General Information       Row Name 23 1041          OT Time and Intention    Document Type therapy note (daily note)  -RB     Mode of Treatment individual therapy;occupational therapy  -RB       Row Name 23 104          General Information    Patient Profile Reviewed yes  -RB     Existing Precautions/Restrictions fall;sternal;cardiac  -RB     Barriers to Rehab medically complex  -RB       Row Name 23 1041           Cognition    Orientation Status (Cognition) oriented x 3  -       Row Name 09/07/23 1041          Safety Issues, Functional Mobility    Safety Issues Affecting Function (Mobility) safety precautions follow-through/compliance;safety precaution awareness  -               User Key  (r) = Recorded By, (t) = Taken By, (c) = Cosigned By      Initials Name Provider Type    RB Jojo Thurman OT Occupational Therapist                     Mobility/ADL's       Row Name 09/07/23 1042          Bed Mobility    Comment, (Bed Mobility) UIC  -       Row Name 09/07/23 1042          Transfers    Transfers sit-stand transfer;stand-sit transfer;bed-chair transfer  -       Row Name 09/07/23 1042          Bed-Chair Transfer    Bed-Chair [place] (Transfers) contact guard  -     Assistive Device (Bed-Chair Transfers) walker, front-wheeled  -       Row Name 09/07/23 1042          Sit-Stand Transfer    Sit-Stand [place] (Transfers) contact guard;standby assist  -     Assistive Device (Sit-Stand Transfers) walker, front-wheeled  -       Row Name 09/07/23 1042          Stand-Sit Transfer    Stand-Sit [place] (Transfers) standby assist;contact guard  -     Assistive Device (Stand-Sit Transfers) walker, front-wheeled  -       Row Name 09/07/23 1042          Toilet Transfer    [place] Level (Toilet Transfer) unable to assess  -       Row Name 09/07/23 1042          Functional Mobility    Functional Mobility- Ind. Level contact guard assist;verbal cues required;standby assist  -     Functional Mobility- Device walker, front-wheeled  -     Functional Mobility- Safety Issues balance decreased during turns  -     Functional Mobility- Comment Improved balance with a walker  -       Row Name 09/07/23 1042          Activities of Daily Living    BADL Assessment/Intervention grooming;lower body dressing  -       Row Name 09/07/23 1042          Grooming Assessment/Training    [place]  Level (Grooming) grooming skills;set up  elevated   -RB     Position (Grooming) sink side  -RB       Row Name 09/07/23 1042          Lower Body Dressing Assessment/Training    [place] Level (Lower Body Dressing) lower body dressing skills;pants/bottoms;don;set up  -RB     Position (Lower Body Dressing) supported sitting;supported standing  -RB               User Key  (r) = Recorded By, (t) = Taken By, (c) = Cosigned By      Initials Name Provider Type    Jojo Ayuob OT Occupational Therapist                   Obj/Interventions       Row Name 09/07/23 1040          Sensory Assessment (Somatosensory)    Sensory Assessment (Somatosensory) sensation intact  -RB       Row Name 09/07/23 1040          Vision Assessment/Intervention    Visual Impairment/Limitations WFL  -RB       Row Name 09/07/23 1040          Motor Skills    Therapeutic Exercise --  The pt reports completing her fine motor coordination exercises, use of theraputty and elevated  overnight and this morning.  -RB       Row Name 09/07/23 1040          Balance    Comment, Balance CGA for OOB mobility using a walker. One small LOB in her room while not using her walker - pt able to recover to midline  -RB               User Key  (r) = Recorded By, (t) = Taken By, (c) = Cosigned By      Initials Name Provider Type    Jojo Ayoub OT Occupational Therapist                   Goals/Plan    No documentation.                  Clinical Impression       Row Name 09/07/23 1041 09/07/23 1040       Pain Assessment    Pretreatment Pain Rating 0/10 - no pain  -RB 0/10 - no pain  -RB    Posttreatment Pain Rating 0/10 - no pain  -RB 0/10 - no pain  -RB    Pain Intervention(s) -- Repositioned;Ambulation/increased activity  -RB      Row Name 09/07/23 1040          Plan of Care Review    Plan of Care Reviewed With patient  -RB     Progress improving  -RB     Outcome Evaluation The pt participated in OT this AM. She continues to demo progress  each day. She completed OOB functional mobility in her room and hallway (rwx) with CGA. One small LOB occurred when not using her walker, able to self correct to midline. She completed seated sinkside grooming ADL's with elevated  applied to her toothbrush. She donned LB underwear/shorts with S/S. She reports success with self feeding utilizing the elevated  while eating her dinner and breakfast trays. The pt declines the need for IRF services and plans to d/c home with HH/OP OT/PT and 24/7 assistance from her family.     Anticipated Discharge Disposition (OT): home with home health, home with 24/7 care, home with outpatient therapy services  -RB       Row Name 09/07/23 1040          Therapy Assessment/Plan (OT)    Rehab Potential (OT) good, to achieve stated therapy goals  -RB     Criteria for Skilled Therapeutic Interventions Met (OT) yes;skilled treatment is necessary  -RB     Therapy Frequency (OT) 5 times/wk  -RB       Row Name 09/07/23 1040          Therapy Plan Review/Discharge Plan (OT)    Anticipated Discharge Disposition (OT) home with assist;home with 24/7 care;home with outpatient therapy services  -RB       Row Name 09/07/23 1040          Vital Signs    Pre SpO2 (%) 98  -RB     O2 Delivery Pre Treatment supplemental O2  -RB     Post SpO2 (%) 94  -RB     O2 Delivery Post Treatment room air  -RB     Pre Patient Position Sitting  -RB     Intra Patient Position Standing  -RB     Post Patient Position Sitting  -RB       Row Name 09/07/23 1040          Positioning and Restraints    Pre-Treatment Position sitting in chair/recliner  -RB     Post Treatment Position chair  -RB     In Chair notified nsg;reclined;sitting;call light within reach;encouraged to call for assist;exit alarm on;legs elevated  -RB               User Key  (r) = Recorded By, (t) = Taken By, (c) = Cosigned By      Initials Name Provider Type    Jojo Ayoub, OT Occupational Therapist                   Outcome Measures        Row Name 09/07/23 0850          How much help from another person do you currently need...    Turning from your back to your side while in flat bed without using bedrails? 4  -CB     Moving from lying on back to sitting on the side of a flat bed without bedrails? 3  -CB     Moving to and from a bed to a chair (including a wheelchair)? 3  -CB     Standing up from a chair using your arms (e.g., wheelchair, bedside chair)? 3  -CB     Climbing 3-5 steps with a railing? 3  -CB     To walk in hospital room? 3  -CB     AM-PAC 6 Clicks Score (PT) 19  -CB     Highest level of mobility 6 --> Walked 10 steps or more  -CB               User Key  (r) = Recorded By, (t) = Taken By, (c) = Cosigned By      Initials Name Provider Type    Citlali Mao, RN Registered Nurse                    Occupational Therapy Education       Title: PT OT SLP Therapies (In Progress)       Topic: Occupational Therapy (Not Started)       Point: ADL training (Not Started)       Description:   Instruct learner(s) on proper safety adaptation and remediation techniques during self care or transfers.   Instruct in proper use of assistive devices.                  Learner Progress:  Not documented in this visit.              Point: Home exercise program (Not Started)       Description:   Instruct learner(s) on appropriate technique for monitoring, assisting and/or progressing therapeutic exercises/activities.                  Learner Progress:  Not documented in this visit.              Point: Precautions (Not Started)       Description:   Instruct learner(s) on prescribed precautions during self-care and functional transfers.                  Learner Progress:  Not documented in this visit.              Point: Body mechanics (Not Started)       Description:   Instruct learner(s) on proper positioning and spine alignment during self-care, functional mobility activities and/or exercises.                  Learner Progress:  Not documented in this visit.                                   OT Recommendation and Plan  Planned Therapy Interventions (OT): transfer/mobility retraining, strengthening exercise, ROM/therapeutic exercise, activity tolerance training, BADL retraining, functional balance retraining, occupation/activity based interventions, patient/caregiver education/training, neuromuscular control/coordination retraining  Therapy Frequency (OT): 5 times/wk  Plan of Care Review  Plan of Care Reviewed With: patient  Progress: improving  Outcome Evaluation: The pt participated in OT this AM. She continues to demo progress each day. She completed OOB functional mobility in her room and hallway (rwx) with CGA. One small LOB occurred when not using her walker, able to self correct to midline. She completed seated sinkside grooming ADL's with elevated  applied to her toothbrush. She donned LB underwear/shorts with S/S. She reports success with self feeding utilizing the elevated  while eating her dinner and breakfast trays. The pt declines the need for IRF services and plans to d/c home with HH/OP OT/PT and 24/7 assistance from her family.     Anticipated Discharge Disposition (OT): home with home health, home with 24/7 care, home with outpatient therapy services     Time Calculation:   Evaluation Complexity (OT)  Review Occupational Profile/Medical/Therapy History Complexity: expanded/moderate complexity  Assessment, Occupational Performance/Identification of Deficit Complexity: 3-5 performance deficits  Clinical Decision Making Complexity (OT): detailed assessment/moderate complexity  Overall Complexity of Evaluation (OT): moderate complexity     Time Calculation- OT       Row Name 09/07/23 1039             Time Calculation- OT    OT Start Time 0835  -RB      OT Stop Time 0904  -RB      OT Time Calculation (min) 29 min  -RB      Total Timed Code Minutes- OT 29 minute(s)  -RB      OT Received On 09/07/23  -RB      OT - Next Appointment 09/08/23  -RB          Timed Charges    71114 - OT Therapeutic Activity Minutes 10  -RB      54396 - OT Self Care/Mgmt Minutes 19  -RB         Total Minutes    Timed Charges Total Minutes 29  -RB       Total Minutes 29  -RB                User Key  (r) = Recorded By, (t) = Taken By, (c) = Cosigned By      Initials Name Provider Type    Jojo Ayoub OT Occupational Therapist                  Therapy Charges for Today       Code Description Service Date Service Provider Modifiers Qty    79296442075 HC OT THERAPEUTIC ACT EA 15 MIN 9/6/2023 Jojo Thurman OT GO 1    36031971051 HC OT SELF CARE/MGMT/TRAIN EA 15 MIN 9/6/2023 Jojo Thurman OT GO 1    17144628350 HC OT THERAPEUTIC ACT EA 15 MIN 9/7/2023 Jojo Thurman OT GO 1    87982759338 HC OT SELF CARE/MGMT/TRAIN EA 15 MIN 9/7/2023 Jojo Thurman OT GO 1                 Jojo Thurman OT  9/7/2023

## 2023-09-07 NOTE — THERAPY TREATMENT NOTE
Patient Name: Lennie Gomez  : 1955    MRN: [id number]                              Today's Date: 2023       Admit Date: 2023    Visit Dx:     ICD-10-CM ICD-9-CM   1. S/P CABG (coronary artery bypass graft)  Z95.1 V45.81     Patient Active Problem List   Diagnosis    Preoperative clearance    Abnormal nuclear stress test    CAD, multiple vessel    Coronary atherosclerosis due to calcified coronary lesion (CODE)    CAD (coronary artery disease)    Type 2 diabetes mellitus with hyperglycemia    Hypertension, essential    Elevated cholesterol    Diabetic polyneuropathy associated with type 2 diabetes mellitus     Past Medical History:   Diagnosis Date    Arthritis     Coronary artery disease     Diabetes mellitus     Elevated cholesterol     Elevated cholesterol 2023    Hypertension     Myocardial infarction      Past Surgical History:   Procedure Laterality Date    CARDIAC CATHETERIZATION      CARDIAC CATHETERIZATION N/A 2023    Procedure: Left Heart Cath with possible coronary angioplasty;  Surgeon: Surendra Sprague MD;  Location: [facility] CATH INVASIVE LOC[facility];  Service: Cardiology;  Laterality: N/A;     SECTION      CORONARY ARTERY BYPASS GRAFT N/A 2023    Procedure: SCOTT STERNOTOMY CORONARY ARTERY BYPASS GRAFT TIMES 4 USING LEFT INTERNAL MAMMARY ARTERY AND RIGHT GREATER SAPHENOUS VEIN GRAFT PER ENDOSCOPIC VEIN HARVESTING AND PRP;  Surgeon: Damir Aaron MD;  Location: [facility];  Service: Cardiothoracic;  Laterality: N/A;      General Information       Row Name 23 1043          Physical Therapy Time and Intention    Document Type therapy note (daily note)  -EJ     Mode of Treatment physical therapy  -EJ       Row Name 23 1043          General Information    Existing Precautions/Restrictions fall;sternal;cardiac  -EJ               User Key  (r) = Recorded By, (t) = Taken By, (c) = Cosigned By      Initials Name Provider Type    EJ  Yuli Goode, PT Physical Therapist                   Mobility       Row Name 09/07/23 1043          Bed Mobility    Supine-Sit [place] (Bed Mobility) standby assist  -EJ     Sit-Supine [place] (Bed Mobility) standby assist  -EJ     Assistive Device (Bed Mobility) bed rails  -EJ       Row Name 09/07/23 1043          Sit-Stand Transfer    Sit-Stand [place] (Transfers) standby assist  -EJ     Assistive Device (Sit-Stand Transfers) walker, front-wheeled  -EJ       Row Name 09/07/23 1043          Gait/Stairs (Locomotion)    [place] Level (Gait) verbal cues;nonverbal cues (demo/gesture);contact guard  -EJ     Assistive Device (Gait) walker, front-wheeled  -EJ     Distance in Feet (Gait) 120  -EJ     Deviations/Abnormal Patterns (Gait) latoya decreased;stride length decreased  -EJ     Bilateral Gait Deviations forward flexed posture  -EJ     Comment, (Gait/Stairs) no unsteadiness, ambulated on room air, no SOA w activity  -EJ               User Key  (r) = Recorded By, (t) = Taken By, (c) = Cosigned By      Initials Name Provider Type    EJ Yuli Goode, PT Physical Therapist                   Obj/Interventions    No documentation.                  Goals/Plan    No documentation.                  Clinical Impression       Row Name 09/07/23 1045          Pain    Pretreatment Pain Rating 0/10 - no pain  -EJ       Row Name 09/07/23 1045          Plan of Care Review    Plan of Care Reviewed With patient  -EJ     Outcome Evaluation Pt agreeable to PT this am. She is doing well, just complains of being hot. Pt progressing w activity. She was able to perform all bed mobility w SBA/supervision. Pt stood w SBA/CGA using Rwx and was able to ambulate approx 120 ft w CGA. No unsteadiness noted. Pt did ambulate on room air with no SOA noted and O2 sats at 92% after activity. Pt back in bed at end of session. Anticipate pt to DC home w HHPT and assistance from family. Will continue to progress activity  as tolerated.  -EJ       Row Name 09/07/23 1045          Therapy Assessment/Plan (PT)    Therapy Frequency (PT) 6 times/wk  -EJ       Row Name 09/07/23 1045          Vital Signs    Pre SpO2 (%) 92  -EJ     O2 Delivery Pre Treatment supplemental O2  -EJ     O2 Delivery Intra Treatment room air  -EJ     Post SpO2 (%) 92  -EJ     O2 Delivery Post Treatment supplemental O2  -EJ     Pre Patient Position Supine  -EJ     Intra Patient Position Standing  -EJ     Post Patient Position Supine  -EJ       Row Name 09/07/23 1045          Positioning and Restraints    Pre-Treatment Position in bed  -EJ     Post Treatment Position bed  -EJ     In Bed notified nsg;supine;call light within reach;encouraged to call for assist;exit alarm on;with other staff  -EJ               User Key  (r) = Recorded By, (t) = Taken By, (c) = Cosigned By      Initials Name Provider Type    Yuli Lucas, PT Physical Therapist                   Outcome Measures       Row Name 09/07/23 1048 09/07/23 0850       How much help from another person do you currently need...    Turning from your back to your side while in flat bed without using bedrails? 4  -EJ 4  -CB    Moving from lying on back to sitting on the side of a flat bed without bedrails? 4  -EJ 3  -CB    Moving to and from a bed to a chair (including a wheelchair)? 3  -EJ 3  -CB    Standing up from a chair using your arms (e.g., wheelchair, bedside chair)? 3  -EJ 3  -CB    Climbing 3-5 steps with a railing? 3  -EJ 3  -CB    To walk in hospital room? 3  -EJ 3  -CB    AM-PAC 6 Clicks Score (PT) 20  -EJ 19  -CB    Highest level of mobility 6 --> Walked 10 steps or more  -EJ 6 --> Walked 10 steps or more  -CB              User Key  (r) = Recorded By, (t) = Taken By, (c) = Cosigned By      Initials Name Provider Type    Yuli Lucas, PT Physical Therapist    Citlali Mao, RN Registered Nurse                                 Physical Therapy Education       Title: PT OT SLP Therapies  (In Progress)       Topic: Physical Therapy (Done)       Point: Mobility training (Done)       Learning Progress Summary             Patient Acceptance, TB,D,E, VU,NR by  at 9/4/2023 1034    Acceptance, E,TB,D, VU,NR by  at 9/3/2023 1527    Acceptance, E,TB,D, VU,NR by  at 9/2/2023 1344                         Point: Home exercise program (Done)       Learning Progress Summary             Patient Acceptance, TB,D,E, VU,NR by  at 9/4/2023 1034    Acceptance, E,TB,D, VU,NR by  at 9/3/2023 1527    Acceptance, E,TB,D, VU,NR by  at 9/2/2023 1344                         Point: Body mechanics (Done)       Learning Progress Summary             Patient Acceptance, TB,D,E, VU,NR by  at 9/4/2023 1034    Acceptance, E,TB,D, VU,NR by  at 9/3/2023 1527    Acceptance, E,TB,D, VU,NR by  at 9/2/2023 1344                         Point: Precautions (Done)       Learning Progress Summary             Patient Acceptance, TB,D,E, VU,NR by  at 9/4/2023 1034    Acceptance, E,TB,D, VU,NR by  at 9/3/2023 1527    Acceptance, E,TB,D, VU,NR by  at 9/2/2023 1344                                         User Key       Initials Effective Dates Name Provider Type Discipline     04/08/22 -  Shakila Atkinson, PT Physical Therapist PT                  PT Recommendation and Plan     Plan of Care Reviewed With: patient  Progress: improving  Outcome Evaluation: Pt agreeable to PT this am. She is doing well, just complains of being hot. Pt progressing w activity. She was able to perform all bed mobility w SBA/supervision. Pt stood w SBA/CGA using Rwx and was able to ambulate approx 120 ft w CGA. No unsteadiness noted. Pt did ambulate on room air with no SOA noted and O2 sats at 92% after activity. Pt back in bed at end of session. Anticipate pt to DC home w HHPT and assistance from family. Will continue to progress activity as tolerated.     Time Calculation:         PT Charges       Row Name 09/07/23 1048             Time  Calculation    Start Time 1013  -EJ      Stop Time 1023  -EJ      Time Calculation (min) 10 min  -EJ      PT Received On 09/07/23  -EJ      PT - Next Appointment 09/08/23  -EJ                User Key  (r) = Recorded By, (t) = Taken By, (c) = Cosigned By      Initials Name Provider Type    Yuli Lucas, PT Physical Therapist                  Therapy Charges for Today       Code Description Service Date Service Provider Modifiers Qty    14412396416  PT THER PROC EA 15 MIN 9/6/2023 Yuli Goode, PT GP 1    38003071984 HC PT THER PROC EA 15 MIN 9/7/2023 Yuli Goode, PT GP 1            PT G-Codes  Outcome Measure Options: AM-PAC 6 Clicks Daily Activity (OT), Modified [place]  AM-PAC 6 Clicks Score (PT): 20  AM-PAC 6 Clicks Score (OT): 16  Modified [place] Scale: 4 - Moderately severe disability.  Unable to walk without assistance, and unable to attend to own bodily needs without assistance.  PT Discharge Summary  Anticipated Discharge Disposition (PT): home with assist, home with home health    Yuli Goode, PT  9/7/2023

## 2023-09-07 NOTE — PLAN OF CARE
Goal Outcome Evaluation:  Plan of Care Reviewed With: patient        Progress: improving  Outcome Evaluation: Pt agreeable to PT this am. She is doing well, just complains of being hot. Pt progressing w activity. She was able to perform all bed mobility w SBA/supervision. Pt stood w SBA/CGA using Rwx and was able to ambulate approx 120 ft w CGA. No unsteadiness noted. Pt did ambulate on room air with no SOA noted and O2 sats at 92% after activity. Pt back in bed at end of session. Anticipate pt to DC home w HHPT and assistance from family. Will continue to progress activity as tolerated.      Anticipated Discharge Disposition (PT): home with assist, home with home health

## 2023-09-08 ENCOUNTER — READMISSION MANAGEMENT (OUTPATIENT)
Dept: CALL CENTER | Facility: HOSPITAL | Age: 68
End: 2023-09-08
Payer: MEDICARE

## 2023-09-08 ENCOUNTER — TELEPHONE (OUTPATIENT)
Dept: NEUROLOGY | Facility: CLINIC | Age: 68
End: 2023-09-08

## 2023-09-08 ENCOUNTER — APPOINTMENT (OUTPATIENT)
Dept: GENERAL RADIOLOGY | Facility: HOSPITAL | Age: 68
DRG: 236 | End: 2023-09-08
Payer: MEDICARE

## 2023-09-08 VITALS
BODY MASS INDEX: 28.4 KG/M2 | SYSTOLIC BLOOD PRESSURE: 143 MMHG | DIASTOLIC BLOOD PRESSURE: 55 MMHG | HEIGHT: 62 IN | RESPIRATION RATE: 18 BRPM | TEMPERATURE: 97.7 F | WEIGHT: 154.32 LBS | HEART RATE: 73 BPM | OXYGEN SATURATION: 95 %

## 2023-09-08 LAB
ANION GAP SERPL CALCULATED.3IONS-SCNC: 11 MMOL/L (ref 5–15)
BUN SERPL-MCNC: 14 MG/DL (ref 8–23)
BUN/CREAT SERPL: 18.2 (ref 7–25)
CALCIUM SPEC-SCNC: 9.4 MG/DL (ref 8.6–10.5)
CHLORIDE SERPL-SCNC: 98 MMOL/L (ref 98–107)
CO2 SERPL-SCNC: 30 MMOL/L (ref 22–29)
CREAT SERPL-MCNC: 0.77 MG/DL (ref 0.57–1)
DEPRECATED RDW RBC AUTO: 44.6 FL (ref 37–54)
EGFRCR SERPLBLD CKD-EPI 2021: 84.7 ML/MIN/1.73
ERYTHROCYTE [DISTWIDTH] IN BLOOD BY AUTOMATED COUNT: 12.9 % (ref 12.3–15.4)
GLUCOSE BLDC GLUCOMTR-MCNC: 175 MG/DL (ref 70–130)
GLUCOSE BLDC GLUCOMTR-MCNC: 177 MG/DL (ref 70–130)
GLUCOSE SERPL-MCNC: 117 MG/DL (ref 65–99)
HCT VFR BLD AUTO: 32.1 % (ref 34–46.6)
HGB BLD-MCNC: 10.4 G/DL (ref 12–15.9)
MCH RBC QN AUTO: 30.9 PG (ref 26.6–33)
MCHC RBC AUTO-ENTMCNC: 32.4 G/DL (ref 31.5–35.7)
MCV RBC AUTO: 95.3 FL (ref 79–97)
PLATELET # BLD AUTO: 252 10*3/MM3 (ref 140–450)
PMV BLD AUTO: 10.7 FL (ref 6–12)
POTASSIUM SERPL-SCNC: 3.6 MMOL/L (ref 3.5–5.2)
POTASSIUM SERPL-SCNC: 4.2 MMOL/L (ref 3.5–5.2)
QT INTERVAL: 348 MS
QT INTERVAL: 503 MS
QTC INTERVAL: 454 MS
QTC INTERVAL: 536 MS
RBC # BLD AUTO: 3.37 10*6/MM3 (ref 3.77–5.28)
SODIUM SERPL-SCNC: 139 MMOL/L (ref 136–145)
WBC NRBC COR # BLD: 8.39 10*3/MM3 (ref 3.4–10.8)

## 2023-09-08 PROCEDURE — 94799 UNLISTED PULMONARY SVC/PX: CPT

## 2023-09-08 PROCEDURE — 25010000002 AMIODARONE IN DEXTROSE 5% 360-4.14 MG/200ML-% SOLUTION: Performed by: NURSE PRACTITIONER

## 2023-09-08 PROCEDURE — 94618 PULMONARY STRESS TESTING: CPT

## 2023-09-08 PROCEDURE — 63710000001 INSULIN GLARGINE PER 5 UNITS: Performed by: INTERNAL MEDICINE

## 2023-09-08 PROCEDURE — 85027 COMPLETE CBC AUTOMATED: CPT | Performed by: NURSE PRACTITIONER

## 2023-09-08 PROCEDURE — 80048 BASIC METABOLIC PNL TOTAL CA: CPT | Performed by: NURSE PRACTITIONER

## 2023-09-08 PROCEDURE — 84132 ASSAY OF SERUM POTASSIUM: CPT | Performed by: THORACIC SURGERY (CARDIOTHORACIC VASCULAR SURGERY)

## 2023-09-08 PROCEDURE — 93010 ELECTROCARDIOGRAM REPORT: CPT | Performed by: STUDENT IN AN ORGANIZED HEALTH CARE EDUCATION/TRAINING PROGRAM

## 2023-09-08 PROCEDURE — 97110 THERAPEUTIC EXERCISES: CPT

## 2023-09-08 PROCEDURE — 63710000001 INSULIN LISPRO (HUMAN) PER 5 UNITS: Performed by: INTERNAL MEDICINE

## 2023-09-08 PROCEDURE — 99024 POSTOP FOLLOW-UP VISIT: CPT | Performed by: THORACIC SURGERY (CARDIOTHORACIC VASCULAR SURGERY)

## 2023-09-08 PROCEDURE — 82948 REAGENT STRIP/BLOOD GLUCOSE: CPT

## 2023-09-08 PROCEDURE — 93005 ELECTROCARDIOGRAM TRACING: CPT | Performed by: NURSE PRACTITIONER

## 2023-09-08 PROCEDURE — 94761 N-INVAS EAR/PLS OXIMETRY MLT: CPT

## 2023-09-08 PROCEDURE — 71046 X-RAY EXAM CHEST 2 VIEWS: CPT

## 2023-09-08 RX ORDER — DOXYCYCLINE 100 MG/1
100 CAPSULE ORAL EVERY 12 HOURS SCHEDULED
Qty: 9 CAPSULE | Refills: 0 | Status: SHIPPED | OUTPATIENT
Start: 2023-09-08 | End: 2023-09-13

## 2023-09-08 RX ORDER — POTASSIUM CHLORIDE 750 MG/1
20 TABLET, FILM COATED, EXTENDED RELEASE ORAL DAILY
Status: DISCONTINUED | OUTPATIENT
Start: 2023-09-08 | End: 2023-09-08 | Stop reason: HOSPADM

## 2023-09-08 RX ORDER — POTASSIUM CHLORIDE 750 MG/1
40 TABLET, FILM COATED, EXTENDED RELEASE ORAL EVERY 4 HOURS
Status: COMPLETED | OUTPATIENT
Start: 2023-09-08 | End: 2023-09-08

## 2023-09-08 RX ORDER — BUMETANIDE 2 MG/1
2 TABLET ORAL DAILY
Status: DISCONTINUED | OUTPATIENT
Start: 2023-09-08 | End: 2023-09-08 | Stop reason: HOSPADM

## 2023-09-08 RX ORDER — POLYETHYLENE GLYCOL 3350 17 G/17G
17 POWDER, FOR SOLUTION ORAL DAILY PRN
Start: 2023-09-08

## 2023-09-08 RX ORDER — POTASSIUM CHLORIDE 20 MEQ/1
20 TABLET, EXTENDED RELEASE ORAL DAILY
Qty: 30 TABLET | Refills: 0 | Status: SHIPPED | OUTPATIENT
Start: 2023-09-09 | End: 2023-10-09

## 2023-09-08 RX ORDER — ASPIRIN 81 MG/1
81 TABLET ORAL DAILY
Qty: 30 TABLET | Refills: 2 | Status: SHIPPED | OUTPATIENT
Start: 2023-09-09 | End: 2023-12-08

## 2023-09-08 RX ORDER — METOPROLOL SUCCINATE 25 MG/1
12.5 TABLET, EXTENDED RELEASE ORAL EVERY 12 HOURS SCHEDULED
Qty: 30 TABLET | Refills: 2 | Status: SHIPPED | OUTPATIENT
Start: 2023-09-08 | End: 2023-12-07

## 2023-09-08 RX ORDER — AMIODARONE HYDROCHLORIDE 200 MG/1
200 TABLET ORAL
Qty: 30 TABLET | Refills: 0 | Status: SHIPPED | OUTPATIENT
Start: 2023-09-09 | End: 2023-10-09

## 2023-09-08 RX ORDER — AMIODARONE HYDROCHLORIDE 200 MG/1
200 TABLET ORAL
Status: DISCONTINUED | OUTPATIENT
Start: 2023-09-08 | End: 2023-09-08 | Stop reason: HOSPADM

## 2023-09-08 RX ORDER — HYDROCODONE BITARTRATE AND ACETAMINOPHEN 5; 325 MG/1; MG/1
1 TABLET ORAL EVERY 4 HOURS PRN
Qty: 42 TABLET | Refills: 0 | Status: SHIPPED | OUTPATIENT
Start: 2023-09-08 | End: 2023-09-15

## 2023-09-08 RX ORDER — ATORVASTATIN CALCIUM 40 MG/1
40 TABLET, FILM COATED ORAL NIGHTLY
Qty: 90 TABLET | Refills: 0 | Status: SHIPPED | OUTPATIENT
Start: 2023-09-08 | End: 2023-12-07

## 2023-09-08 RX ORDER — AMIODARONE HYDROCHLORIDE 200 MG/1
200 TABLET ORAL EVERY 12 HOURS SCHEDULED
Status: DISCONTINUED | OUTPATIENT
Start: 2023-09-08 | End: 2023-09-08

## 2023-09-08 RX ORDER — BUMETANIDE 2 MG/1
2 TABLET ORAL DAILY
Qty: 30 TABLET | Refills: 0 | Status: SHIPPED | OUTPATIENT
Start: 2023-09-09 | End: 2023-10-09

## 2023-09-08 RX ADMIN — GUAIFENESIN 1200 MG: 600 TABLET, EXTENDED RELEASE ORAL at 08:14

## 2023-09-08 RX ADMIN — AMIODARONE HYDROCHLORIDE 0.5 MG/MIN: 1.8 INJECTION, SOLUTION INTRAVENOUS at 06:12

## 2023-09-08 RX ADMIN — Medication 1000 UNITS: at 08:15

## 2023-09-08 RX ADMIN — INSULIN LISPRO 7 UNITS: 100 INJECTION, SOLUTION INTRAVENOUS; SUBCUTANEOUS at 12:46

## 2023-09-08 RX ADMIN — AMIODARONE HYDROCHLORIDE 200 MG: 200 TABLET ORAL at 08:14

## 2023-09-08 RX ADMIN — Medication 10 ML: at 08:20

## 2023-09-08 RX ADMIN — POLYETHYLENE GLYCOL 3350 17 G: 17 POWDER, FOR SOLUTION ORAL at 08:16

## 2023-09-08 RX ADMIN — POTASSIUM CHLORIDE 40 MEQ: 750 TABLET, EXTENDED RELEASE ORAL at 05:16

## 2023-09-08 RX ADMIN — METOPROLOL SUCCINATE 12.5 MG: 25 TABLET, EXTENDED RELEASE ORAL at 08:13

## 2023-09-08 RX ADMIN — INSULIN LISPRO 3 UNITS: 100 INJECTION, SOLUTION INTRAVENOUS; SUBCUTANEOUS at 07:30

## 2023-09-08 RX ADMIN — CLOPIDOGREL BISULFATE 75 MG: 75 TABLET, FILM COATED ORAL at 08:14

## 2023-09-08 RX ADMIN — ASPIRIN 81 MG: 81 TABLET, COATED ORAL at 08:15

## 2023-09-08 RX ADMIN — PANTOPRAZOLE SODIUM 40 MG: 40 TABLET, DELAYED RELEASE ORAL at 05:16

## 2023-09-08 RX ADMIN — INSULIN LISPRO 7 UNITS: 100 INJECTION, SOLUTION INTRAVENOUS; SUBCUTANEOUS at 08:46

## 2023-09-08 RX ADMIN — DOXYCYCLINE 100 MG: 100 CAPSULE ORAL at 08:14

## 2023-09-08 RX ADMIN — BUMETANIDE 2 MG: 2 TABLET ORAL at 08:46

## 2023-09-08 RX ADMIN — POTASSIUM CHLORIDE 40 MEQ: 750 TABLET, EXTENDED RELEASE ORAL at 08:14

## 2023-09-08 RX ADMIN — MUPIROCIN 1 APPLICATION: 20 OINTMENT TOPICAL at 08:18

## 2023-09-08 RX ADMIN — POTASSIUM CHLORIDE 20 MEQ: 750 TABLET, EXTENDED RELEASE ORAL at 08:20

## 2023-09-08 RX ADMIN — INSULIN LISPRO 3 UNITS: 100 INJECTION, SOLUTION INTRAVENOUS; SUBCUTANEOUS at 12:45

## 2023-09-08 RX ADMIN — INSULIN GLARGINE 25 UNITS: 100 INJECTION, SOLUTION SUBCUTANEOUS at 08:17

## 2023-09-08 NOTE — PROGRESS NOTES
" LOS: 8 days   Patient Care Team:  Jacqui Patterson APRN as PCP - General (Nurse Practitioner)    Chief Complaint: post op    Subjective:  Symptoms:  She reports cough.  No shortness of breath or chest pain.    Diet:  Adequate intake.  No nausea or vomiting.    Activity level: Impaired due to weakness.    Pain:  She complains of pain that is mild.  Pain is well controlled.        Vital Signs  Temp:  [97.9 °F (36.6 °C)-98.8 °F (37.1 °C)] 97.9 °F (36.6 °C)  Heart Rate:  [] 70  Resp:  [18] 18  BP: (116-137)/(46-77) 137/62  Body mass index is 28.22 kg/m².    Intake/Output Summary (Last 24 hours) at 9/8/2023 0914  Last data filed at 9/8/2023 0813  Gross per 24 hour   Intake 320 ml   Output 2200 ml   Net -1880 ml       I/O this shift:  In: 320 [P.O.:320]  Out: 400 [Urine:400]          09/06/23  0624 09/07/23  0712 09/08/23  0720   Weight: 71.5 kg (157 lb 11.2 oz) 69.9 kg (154 lb 1.6 oz) 70 kg (154 lb 5.2 oz)       Objective:  General Appearance:  Comfortable and in no acute distress.    Vital signs: (most recent): Blood pressure 137/62, pulse 70, temperature 97.9 °F (36.6 °C), temperature source Oral, resp. rate 18, height 157.5 cm (62.01\"), weight 70 kg (154 lb 5.2 oz), SpO2 96 %.  Vital signs are normal.  No fever.    Output: Producing urine and producing stool.    Lungs:  Normal effort and normal respiratory rate.  There are decreased breath sounds.    Heart: Normal rate.  Regular rhythm.  (SR on tele monitor)  Abdomen: Abdomen is soft.  Bowel sounds are normal.     Extremities: There is no dependent edema.    Pulses: Distal pulses are intact.    Neurological: Patient is alert and oriented to person, place and time.    Skin:  Warm and dry.  (Sternal incision clean and dry)        Results Review:        WBC WBC   Date Value Ref Range Status   09/08/2023 8.39 3.40 - 10.80 10*3/mm3 Final      HGB Hemoglobin   Date Value Ref Range Status   09/08/2023 10.4 (L) 12.0 - 15.9 g/dL Final      HCT Hematocrit   Date " Value Ref Range Status   09/08/2023 32.1 (L) 34.0 - 46.6 % Final      Platelets Platelets   Date Value Ref Range Status   09/08/2023 252 140 - 450 10*3/mm3 Final        PT/INR:  No results found for: PROTIME/No results found for: INR    Sodium Sodium   Date Value Ref Range Status   09/08/2023 139 136 - 145 mmol/L Final   09/07/2023 139 136 - 145 mmol/L Final   09/06/2023 139 136 - 145 mmol/L Final      Potassium Potassium   Date Value Ref Range Status   09/08/2023 3.6 3.5 - 5.2 mmol/L Final   09/07/2023 3.8 3.5 - 5.2 mmol/L Final   09/06/2023 4.2 3.5 - 5.2 mmol/L Final   09/06/2023 3.4 (L) 3.5 - 5.2 mmol/L Final   09/05/2023 3.7 3.5 - 5.2 mmol/L Final      Chloride Chloride   Date Value Ref Range Status   09/08/2023 98 98 - 107 mmol/L Final   09/07/2023 98 98 - 107 mmol/L Final   09/06/2023 99 98 - 107 mmol/L Final      Bicarbonate CO2   Date Value Ref Range Status   09/08/2023 30.0 (H) 22.0 - 29.0 mmol/L Final   09/07/2023 27.0 22.0 - 29.0 mmol/L Final   09/06/2023 30.0 (H) 22.0 - 29.0 mmol/L Final      BUN BUN   Date Value Ref Range Status   09/08/2023 14 8 - 23 mg/dL Final   09/07/2023 11 8 - 23 mg/dL Final   09/06/2023 9 8 - 23 mg/dL Final      Creatinine Creatinine   Date Value Ref Range Status   09/08/2023 0.77 0.57 - 1.00 mg/dL Final   09/07/2023 0.70 0.57 - 1.00 mg/dL Final   09/06/2023 0.55 (L) 0.57 - 1.00 mg/dL Final      Calcium Calcium   Date Value Ref Range Status   09/08/2023 9.4 8.6 - 10.5 mg/dL Final   09/07/2023 9.5 8.6 - 10.5 mg/dL Final   09/06/2023 9.3 8.6 - 10.5 mg/dL Final      Magnesium Magnesium   Date Value Ref Range Status   09/07/2023 3.5 (H) 1.6 - 2.4 mg/dL Final          amiodarone, 200 mg, Oral, Q24H  aspirin, 81 mg, Oral, Daily  atorvastatin, 40 mg, Oral, Nightly  bumetanide, 2 mg, Oral, Daily  cholecalciferol, 1,000 Units, Oral, Daily  clopidogrel, 75 mg, Oral, Daily  docusate sodium, 100 mg, Oral, BID  doxycycline, 100 mg, Oral, Q12H  enoxaparin, 40 mg, Subcutaneous,  Daily  guaiFENesin, 1,200 mg, Oral, Q12H  insulin glargine, 25 Units, Subcutaneous, Daily  insulin lispro, 3-14 Units, Subcutaneous, 4x Daily AC & at Bedtime  insulin lispro, 7 Units, Subcutaneous, TID With Meals  metoprolol succinate XL, 12.5 mg, Oral, Q12H  mupirocin, , Each Nare, BID  pantoprazole, 40 mg, Oral, QAM  polyethylene glycol, 17 g, Oral, Daily  potassium chloride, 20 mEq, Oral, BID With Meals  potassium chloride, 20 mEq, Oral, Daily  senna-docusate sodium, 2 tablet, Oral, Nightly  sodium chloride, 10 mL, Intravenous, Q12H      sodium chloride, 30 mL/hr, Last Rate: 30 mL/hr (09/01/23 1120)        Assessment & Plan  - severe multi-vessel CAD -s/p CABGx4 LIMA/RSVG- POD#7  - ICM--EF 42%; GDMT as tolerated  - hypertension  - hyperlipidemia--statin therapy  - DM II  - severe bilateral carotid stenosis; On plavix  - current tobacco use  - post op anemia--expected acute blood loss    Looks good this morning sitting up in the chair  Weaned to RA. Will need nocturnal oxygen at discharge. Plan for walking oximetry today  Excellent response to IV diuretics yesterday. Transition to oral dosing today. Replete potassium  Converted to SR with IV amiodarone. Transition to oral dosing. QTC is a little prolonged, but patient appears to have a BBB. Will place on daily dosing and monitor  Mobilize/Aggressive pulmonary toilet-- continue Mucinex/nebs/doxycycline  Possibly home with home health later today   Continue routine care    HERVE Garza  09/08/23  09:14 EDT

## 2023-09-08 NOTE — PROGRESS NOTES
Exercise Oximetry    Patient Name:Lennie Gomez   MRN: [id number]   Date: 09/08/23             ROOM AIR BASELINE   SpO2% 93   Heart Rate 78   Blood Pressure      EXERCISE ON ROOM AIR SpO2% EXERCISE ON O2 @  LPM SpO2%   1 MINUTE 93 1 MINUTE    2 MINUTES 92 2 MINUTES    3 MINUTES 91 3 MINUTES    4 MINUTES 89 4 MINUTES    5 MINUTES 90 5 MINUTES    6 MINUTES 90 6 MINUTES               Distance Walked   Distance Walked   Dyspnea (Chris Scale)   Dyspnea (Chris Scale)   Fatigue (Chris Scale)   Fatigue (Chris Scale)   SpO2% Post Exercise  90 SpO2% Post Exercise   HR Post Exercise  88 HR Post Exercise   Time to Recovery   Time to Recovery     Comments: Pt tolerated walk well and required no O2 during the walk. Pt is resting in her room on RA.

## 2023-09-08 NOTE — PLAN OF CARE
Goal Outcome Evaluation:  Plan of Care Reviewed With: patient        Progress: improving  Outcome Evaluation: Pt doing well and agreeable to PT this am. She is progressing w mobility. Pt able to perform bed mobility w supervision/independence. She stood w SBA and then ambulated over 120 ft without difficulty w SBA. Pt w no SOA noted w activity. She did not use any AD. No unsteadiness noted. Pt plans home likely today.      Anticipated Discharge Disposition (PT): home with assist, home with home health

## 2023-09-08 NOTE — THERAPY TREATMENT NOTE
Patient Name: Lennie Gomez  : 1955    MRN: [id number]                              Today's Date: 2023       Admit Date: 2023    Visit Dx:     ICD-10-CM ICD-9-CM   1. S/P CABG (coronary artery bypass graft)  Z95.1 V45.81   2. Acute postoperative respiratory insufficiency  J95.89 518.52     Patient Active Problem List   Diagnosis    Preoperative clearance    Abnormal nuclear stress test    CAD, multiple vessel    Coronary atherosclerosis due to calcified coronary lesion (CODE)    CAD (coronary artery disease)    Type 2 diabetes mellitus with hyperglycemia    Hypertension, essential    Elevated cholesterol    Diabetic polyneuropathy associated with type 2 diabetes mellitus     Past Medical History:   Diagnosis Date    Arthritis     Coronary artery disease     Diabetes mellitus     Elevated cholesterol     Elevated cholesterol 2023    Hypertension     Myocardial infarction      Past Surgical History:   Procedure Laterality Date    CARDIAC CATHETERIZATION      CARDIAC CATHETERIZATION N/A 2023    Procedure: Left Heart Cath with possible coronary angioplasty;  Surgeon: Surendra Sprague MD;  Location: [facility] CATH INVASIVE LOCATION;  Service: Cardiology;  Laterality: N/A;     SECTION      CORONARY ARTERY BYPASS GRAFT N/A 2023    Procedure: SCOTT STERNOTOMY CORONARY ARTERY BYPASS GRAFT TIMES 4 USING LEFT INTERNAL MAMMARY ARTERY AND RIGHT GREATER SAPHENOUS VEIN GRAFT PER ENDOSCOPIC VEIN HARVESTING AND PRP;  Surgeon: Damir Aaron MD;  Location: [facility];  Service: Cardiothoracic;  Laterality: N/A;      General Information       Row Name 23 1307          Physical Therapy Time and Intention    Document Type therapy note (daily note)  -EJ     Mode of Treatment physical therapy  -EJ       Row Name 23 1307          General Information    Existing Precautions/Restrictions fall;sternal;cardiac  -EJ               User Key  (r) = Recorded By, (t) = Taken  By, (c) = Cosigned By      Initials Name Provider Type    Yuli Lucas, PT Physical Therapist                   Mobility       Row Name 09/08/23 1308          Bed Mobility    Supine-Sit [place] (Bed Mobility) standby assist  -EJ     Sit-Supine [place] (Bed Mobility) independent  -EJ     Assistive Device (Bed Mobility) bed rails  -EJ       Row Name 09/08/23 1308          Sit-Stand Transfer    Sit-Stand [place] (Transfers) standby assist  -EJ       Row Name 09/08/23 1308          Gait/Stairs (Locomotion)    [place] Level (Gait) standby assist  -EJ     Distance in Feet (Gait) 120  -EJ     Deviations/Abnormal Patterns (Gait) latoya decreased;stride length decreased  -EJ     Comment, (Gait/Stairs) no unsteadiness noted, ambulated without AD today  -EJ               User Key  (r) = Recorded By, (t) = Taken By, (c) = Cosigned By      Initials Name Provider Type    Yuli Lucas, PT Physical Therapist                   Obj/Interventions    No documentation.                  Goals/Plan    No documentation.                  Clinical Impression       Row Name 09/08/23 1308          Pain    Pretreatment Pain Rating 0/10 - no pain  -EJ       [facility] Name 09/08/23 1308          Plan of Care Review    Plan of Care Reviewed With patient  -EJ     Outcome Evaluation Pt doing well and agreeable to PT this am. She is progressing w mobility. Pt able to perform bed mobility w supervision/independence. She stood w SBA and then ambulated over 120 ft without difficulty w SBA. Pt w no SOA noted w activity. She did not use any AD. No unsteadiness noted. Pt plans home likely today.  -       Row Name 09/08/23 1308          Positioning and Restraints    Pre-Treatment Position in bed  -EJ     Post Treatment Position bed  -EJ     In Bed notified nsg;supine;call light within reach;encouraged to call for assist  -EJ               User Key  (r) = Recorded By, (t) = Taken By, (c) = Cosigned By      Initials Name  Provider Type    Yuli Lucas, PT Physical Therapist                   Outcome Measures       Row Name 09/08/23 1310          How much help from another person do you currently need...    Turning from your back to your side while in flat bed without using bedrails? 4  -EJ     Moving from lying on back to sitting on the side of a flat bed without bedrails? 4  -EJ     Moving to and from a bed to a chair (including a wheelchair)? 4  -EJ     Standing up from a chair using your arms (e.g., wheelchair, bedside chair)? 4  -EJ     Climbing 3-5 steps with a railing? 3  -EJ     To walk in hospital room? 3  -EJ     AM-PAC 6 Clicks Score (PT) 22  -EJ     Highest level of mobility 7 --> Walked 25 feet or more  -EJ               User Key  (r) = Recorded By, (t) = Taken By, (c) = Cosigned By      Initials Name Provider Type    Yuli Lucas, PT Physical Therapist                                 Physical Therapy Education       Title: PT OT SLP Therapies (Resolved)       Topic: Physical Therapy (Resolved)       Point: Mobility training (Resolved)       Learning Progress Summary             Patient Acceptance, TB,D,E, VU,NR by  at 9/4/2023 1034    Acceptance, E,TB,D, VU,NR by  at 9/3/2023 1527    Acceptance, E,TB,D, VU,NR by  at 9/2/2023 1344                         Point: Home exercise program (Resolved)       Learning Progress Summary             Patient Acceptance, TB,D,E, VU,NR by  at 9/4/2023 1034    Acceptance, E,TB,D, VU,NR by  at 9/3/2023 1527    Acceptance, E,TB,D, VU,NR by  at 9/2/2023 1344                         Point: Body mechanics (Resolved)       Learning Progress Summary             Patient Acceptance, TB,D,E, VU,NR by  at 9/4/2023 1034    Acceptance, E,TB,D, VU,NR by  at 9/3/2023 1527    Acceptance, E,TB,D, VU,NR by  at 9/2/2023 1344                         Point: Precautions (Resolved)       Learning Progress Summary             Patient Acceptance, TB,D,E, VU,NR by  at 9/4/2023  1034    Acceptance, E,TB,D, VU,NR by  at 9/3/2023 1527    Acceptance, E,TB,D, VU,NR by  at 9/2/2023 1344                                         User Key       Initials Effective Dates Name Provider Type [facility] 04/08/22 -  Shakila Atkinson, PT Physical Therapist PT                  PT Recommendation and Plan     Plan of Care Reviewed With: patient  Progress: improving  Outcome Evaluation: Pt doing well and agreeable to PT this am. She is progressing w mobility. Pt able to perform bed mobility w supervision/independence. She stood w SBA and then ambulated over 120 ft without difficulty w SBA. Pt w no SOA noted w activity. She did not use any AD. No unsteadiness noted. Pt plans home likely today.     Time Calculation:         PT Charges       Row Name 09/08/23 1311             Time Calculation    Start Time 1135  -EJ      Stop Time 1146  -EJ      Time Calculation (min) 11 min  -EJ      PT Received On 09/08/23  -EJ      PT - Next Appointment 09/09/23  -EJ                User Key  (r) = Recorded By, (t) = Taken By, (c) = Cosigned By      Initials Name Provider Type    Yuli Lucas, PT Physical Therapist                  Therapy Charges for Today       Code Description Service Date Service Provider Modifiers Qty    52089312087 HC PT THER PROC EA 15 MIN 9/7/2023 Yuli Goode, PT GP 1    87036336019 HC PT THER PROC EA 15 MIN 9/8/2023 Yuli Goode, PT GP 1            PT G-Codes  Outcome Measure Options: AM-PAC 6 Clicks Daily Activity (OT), Modified [place]  AM-PAC 6 Clicks Score (PT): 22  AM-PAC 6 Clicks Score (OT): 16  Modified [place] Scale: 4 - Moderately severe disability.  Unable to walk without assistance, and unable to attend to own bodily needs without assistance.  PT Discharge Summary  Anticipated Discharge Disposition (PT): home with assist, home with home health    Yuli Goode PT  9/8/2023

## 2023-09-08 NOTE — DISCHARGE SUMMARY
Date of Admission: 8/31/2023  Date of Discharge:  9/8/2023    Discharge Diagnosis:   - Severe multi-vessel CAD -s/p CABGx4 LIMA/RSVG - [place]   - ICM--EF 42%; GDMT as tolerated  - Hypertension  - Hyperlipidemia--statin therapy  - DM II  - Severe bilateral carotid stenosis  - Current tobacco use  - Post op anemia--expected acute blood loss    Presenting Problem/History of Present Illness  CAD (coronary artery disease) [I25.10]     Patient is a 68 y.o. female with PMH of hypertension, CAD (previous MI in 1996 requiring balloon angioplasty), hypertension, hyperlipidemia, current tobacco use, and DM II. Patient has left carotid stenosis and was undergoing work up for possible intervention. As part of pre-op clearance, patient underwent stress test on 8/8 which revealed mildly reduced EF of 42% and a lateral reversible perfusion defect consistent with ischemia. Patient underwent left heart catheterization which revealed multi-vessel CAD and was referred to Dr. Aaron for surgical evaluation. Dr. Aaron recommended CABG and patient was agreeable and worked up for surgery.     Hospital Course    Patient was admitted on 8/31 and on 9/1, patient underwent Elective CABG x 4 with a LIMA to the mid LAD and reverse Indivina saphenous vein graft to the PDA, low marginal and diagonal, EVH of the right leg, and Comprehensive neuro monitoring with Dr. Aaron (see op-note for more detail). Post-operatively she did well. She was extubated on day of surgery, weaned from pressors, and transferred to stepdown on POD#1. On POD #2 neurology was consulted due to impaired coordination/weakness on the right upper extremity. They recommended DAPT and outpatient follow up with vascular surgery. These symptoms later resolved. She was noted to have vascular congestion/pleural effusions and was aggressively diuresed with improvement. Plan to discharge home on oral diuretics. On POD#6 she had a brief episode of atrial fibrillation, but was converted  with IV amiodarone. This was transitioned to oral dosing and is to be continued for 1 month post-discharge. Anticoagulation was not started due to brief nature of arrhythmia per Dr. Aaron. Chest tubes, bob, central line, and epicardial wires were removed without issue. Patient was on Plavix prior to admission secondary to carotid stenosis. This was resumed after surgery. Plan for patient to follow up with her vascular surgery in 2 weeks for further evaluation. She was weaned from oxygen during the day, but overnight oximetry revealed over 4 hours of desaturation below 89%. She will need nocturnal oxygen at discharge. She is tolerating the current medication regimen and has met PT goals. She is urinating and defecating without issue, and is eating and drinking sufficiently. On POD#7 she was deemed ready for discharge home with home health. Sternal precautions reviewed. Follow ups as below.     Procedures Performed  Procedure(s):  SCOTT STERNOTOMY CORONARY ARTERY BYPASS GRAFT TIMES 4 USING LEFT INTERNAL MAMMARY ARTERY AND RIGHT GREATER SAPHENOUS VEIN GRAFT PER ENDOSCOPIC VEIN HARVESTING AND PRP       Consults:   Consults       Date and Time Order Name Status Description    9/3/2023 11:37 AM Inpatient Neurology Consult Other (see comments) Completed     9/2/2023 10:08 AM Inpatient Internal Medicine Consult Completed             Pertinent Test Results:    Lab Results   Component Value Date    WBC 8.39 09/08/2023    HGB 10.4 (L) 09/08/2023    HCT 32.1 (L) 09/08/2023    MCV 95.3 09/08/2023     09/08/2023      Lab Results   Component Value Date    GLUCOSE 117 (H) 09/08/2023    CALCIUM 9.4 09/08/2023     09/08/2023    K 4.2 09/08/2023    CO2 30.0 (H) 09/08/2023    CL 98 09/08/2023    BUN 14 09/08/2023    CREATININE 0.77 09/08/2023    BCR 18.2 09/08/2023    ANIONGAP 11.0 09/08/2023     Lab Results   Component Value Date    INR 1.21 (H) 09/02/2023    PROTIME 15.5 (H) 09/02/2023         Condition on Discharge:  Stable     Vital Signs  Temp:  [97.7 °F (36.5 °C)-98.8 °F (37.1 °C)] 97.7 °F (36.5 °C)  Heart Rate:  [64-77] 73  Resp:  [18] 18  BP: (122-143)/(46-64) 143/55      Discharge Disposition  Home-Health Care Svc    Discharge Medications     Discharge Medications        New Medications        Instructions Start Date   amiodarone 200 MG tablet  Commonly known as: PACERONE   200 mg, Oral, Every 24 Hours Scheduled   Start Date: September 9, 2023     Aspirin Low Dose 81 MG EC tablet  Generic drug: aspirin   81 mg, Oral, Daily   Start Date: September 9, 2023     atorvastatin 40 MG tablet  Commonly known as: LIPITOR   40 mg, Oral, Nightly      bumetanide 2 MG tablet  Commonly known as: BUMEX   2 mg, Oral, Daily   Start Date: September 9, 2023     doxycycline 100 MG capsule  Commonly known as: MONODOX   100 mg, Oral, Every 12 Hours Scheduled      HYDROcodone-acetaminophen 5-325 MG per tablet  Commonly known as: NORCO   1 tablet, Oral, Every 4 Hours PRN      polyethylene glycol 17 g packet  Commonly known as: MIRALAX   17 g, Oral, Daily PRN      potassium chloride 20 MEQ CR tablet  Commonly known as: K-DUR,KLOR-CON   20 mEq, Oral, Daily   Start Date: September 9, 2023            Changes to Medications        Instructions Start Date   metoprolol succinate XL 25 MG 24 hr tablet  Commonly known as: TOPROL-XL  What changed:   medication strength  how much to take  when to take this   12.5 mg, Oral, Every 12 Hours Scheduled             Continue These Medications        Instructions Start Date   clopidogrel 75 MG tablet  Commonly known as: PLAVIX   75 mg, Oral, Daily      ergocalciferol 1.25 MG (05767 UT) capsule  Commonly known as: ERGOCALCIFEROL   50,000 Units, Oral, Weekly      Lantus SoloStar 100 UNIT/ML injection pen  Generic drug: Insulin Glargine   Lantus Solostar U-100 Insulin 100 unit/mL (3 mL) subcutaneous pen      Levemir FlexTouch 100 UNIT/ML injection  Generic drug: insulin detemir   20 Units, Subcutaneous, Daily       metFORMIN 1000 MG tablet  Commonly known as: GLUCOPHAGE   1,000 mg, Oral, 2 Times Daily With Meals      venlafaxine 75 MG tablet  Commonly known as: EFFEXOR   75 mg, Oral, Daily             Stop These Medications      losartan-hydrochlorothiazide 100-12.5 MG per tablet  Commonly known as: HYZAAR     rosuvastatin 40 MG tablet  Commonly known as: CRESTOR              Discharge Diet: Heart healthy     Activity at Discharge:   1. No driving until seen in office and off narcotic pain medications.  2. Shower daily. Clean incisions with warm water and antibacterial soap only. Do not put any lotion or ointments on incisions.  3. Ambulate for 10 minutes at least 3 times a day.  4. No heavy lifting > 10lbs until seen in office.   5. Take all medications as prescribed.      Follow-up Appointments  Future Appointments   Date Time Provider Department Center   10/4/2023  1:00 PM Jane Polk APRN [facility]   10/6/2023 12:15 PM Surendra Sprague [place] [facility]     Additional Instructions for the Follow-ups that You Need to Schedule       Ambulatory Referral to Cardiac Rehab   As directed      Ambulatory Referral to Home Health ([facility])   As directed      Face to Face Visit Date: 9/8/2023   Follow-up provider for Plan of Care?: I will be treating the patient on an ongoing basis.  Please send me the Plan of Care for signature.   Follow-up provider: ELIZABETH BENOIT [[id number]]   Reason/Clinical Findings: post op open heart   Describe mobility limitations that make leaving home difficult: post operative weakness   Nursing/Therapeutic Services Requested: Skilled Nursing   Skilled nursing orders: Post CABG care Medication education O2 instruction   Frequency: 1 Week 1        Call MD With Problems / Concerns   As directed      Instructions:  Call office at [phone] for any drainage, increased redness, or fever over 100.5    Order Comments: Instructions:  Call office at [phone] for any drainage,  increased redness, or fever over 100.5         Discharge Follow-up with PCP   As directed       Currently Documented PCP:    Jacqui Patterson APRN    PCP Phone Number:    [phone]     Follow Up Details: in 1 week        Discharge Follow-up with Specialty: Vascular surgery--Dr. Ochoa; 2 Weeks   As directed      Specialty: Vascular surgery--Dr. Ochoa   Follow Up: 2 Weeks   Follow Up Details: bring all prescription bottles to appointment, call for appointment        Discharge Follow-up with Specified Provider: HERVE Bonds 10/4/23 at 1pm   As directed      To: HERVE Bonds 10/4/23 at 1pm   Follow Up Details: bring all current medications to appointment        Discharge Follow-up with Specified Provider: Cardiologist--Dr. Sprague; 2 Weeks   As directed      To: Cardiologist--Dr. Sprague   Follow Up: 2 Weeks   Follow Up Details: call for appointment, bring all medication bottles to appointment                Test Results Pending at Discharge: None        HERVE Garza  09/08/23  15:36 EDT

## 2023-09-08 NOTE — CASE MANAGEMENT/SOCIAL WORK
Case Management Discharge Note      Final Note: Pt discharged home 9/8/23.  Pt needed nocturnal oxygen at d/c.  Pt agreeable to using [facility] as provider.  Asha/Masood completed eval and will deliver concentrator to Pt home this afternoon.  [facility]  notified that Pt d/c home...............Hailey GERARD/BIRD CESAR         Selected Continued Care - Discharged on 9/8/2023 Admission date: 8/31/2023 - Discharge disposition: Home-Health Care Svc      Destination    No services have been selected for the patient.                Durable Medical Equipment Coordination complete.      Service Provider Selected Services Address Phone Fax Patient Preferred    [facility] [address] [facility] [place] [address] [phone] --              Dialysis/Infusion    No services have been selected for the patient.                Home Medical Care Coordination complete.      Service Provider Selected Services Address Phone Fax Patient Preferred    [facility] [address] [id number] [phone] --              Therapy    No services have been selected for the patient.                Community Resources    No services have been selected for the patient.                Community & DME    No services have been selected for the patient.                         Final Discharge Disposition Code: 06 - home with home health care

## 2023-09-08 NOTE — PROGRESS NOTES
[facility] Clinical Pharmacy Services: National Cardiology Data Registry (NCDR) Medication Review    Lennie DAVIS Jason is s/p CABG x4 . Pharmacy to review discharge medications to make sure appropriate medications have been prescribed.    Patient has been discharged on the following:  Aspirin: 81 mg once daily  High Intensity Statin: atorvastatin 40 mg once daily  Beta-blocker: metoprolol succinate 12.5 mg twice daily   P2Y12 Inhibitor: clopidogrel 75 mg once daily  LVEF <40: no    These medications meet the requirements for NCDR discharge medication for chest pain and MI.    Rogerio Vital [facility]  Clinical Pharmacist

## 2023-09-08 NOTE — TELEPHONE ENCOUNTER
"  Caller: Lennie Gomez    Relationship to patient: Self    Best call back number: [phone]    New or established patient?  [x] New  [] Established    Date of discharge: 9/8/2023    Facility discharged from: [facility]    Diagnosis/Symptoms: RIGHT ARM APRAXIA    Length of stay (If applicable): 8 DAYS    Specialty Only: Did you see a [facility] provider?    [x] Yes  [] No  If so, who? DR MORRISON      DISCHARGE STATES \"Follow up with Atul Morrison MD (Neurology) in 3 months (12/7/2023) \"    "

## 2023-09-08 NOTE — DISCHARGE PLACEMENT REQUEST
"Lennie Gomez (68 y.o. Female)       Date of Birth   1955    Social Security Number       Address   [address] [place] [id number]    Home Phone   [phone]    MRN   [id number]       [facility]    Marital Status                               Admission Date   8/31/23    Admission Type   Urgent    Admitting Provider   Damir Aaron MD    Attending Provider   Damir Aaron MD    Department, Room/Bed   [facility] CARDIOVASC UNIT, 2230/1       Discharge Date       Discharge Disposition   Home-Health Care Svc    Discharge Destination                                 Attending Provider: Damir Aaron MD    Allergies: Theophylline    Isolation: None   Infection: None   Code Status: CPR    Ht: 157.5 cm (62.01\")   Wt: 70 kg (154 lb 5.2 oz)    Admission Cmt: None   Principal Problem: CAD (coronary artery disease) [I25.10]                   Active Insurance as of 8/31/2023       Primary Coverage       Payor Plan Insurance Group Employer/Plan Group    HUMANA MEDICARE REPLACEMENT HUMANA MEDICARE REPLACEMENT [id number]       Payor Plan Address Payor Plan Phone Number Payor Plan Fax Number Effective Dates    [address] [address] [phone]  1/1/2022 - None Entered    [facility] [address]         Subscriber Name Subscriber Birth Date Member ID       LENNIE GOMEZ 1955 [id number]                     Emergency Contacts        (Rel.) Home Phone Work Phone Mobile Phone    GABRIELLA TELLEZ (Daughter) [phone] -- [phone]    Azra Russo (Daughter) [phone] -- [phone]            "

## 2023-09-09 NOTE — OUTREACH NOTE
Prep Survey      Flowsheet Row Responses   [facility] facility patient discharged from? [place]   Is LACE score < 7 ? No   Eligibility Readm Mgmt   Discharge diagnosis Severe multi-vessel CAD -s/p CABGx4   Does the patient have one of the following disease processes/diagnoses(primary or secondary)? Cardiothoracic surgery   Does the patient have Home health ordered? Yes   What is the Home health agency?  [facility]   Is there a DME ordered? Yes   What DME was ordered? hs O2- [employer]   Prep survey completed? Yes            Zaida ROY - Registered Nurse

## 2023-09-11 ENCOUNTER — TELEPHONE (OUTPATIENT)
Dept: CARDIAC SURGERY | Facility: CLINIC | Age: 68
End: 2023-09-11
Payer: MEDICARE

## 2023-09-11 ENCOUNTER — OUTSIDE FACILITY SERVICE (OUTPATIENT)
Dept: CARDIAC SURGERY | Facility: CLINIC | Age: 68
End: 2023-09-11
Payer: MEDICARE

## 2023-09-11 NOTE — TELEPHONE ENCOUNTER
Caller: GABRIELLA TELLEZ    Relationship: Emergency Contact    Best call back number: [phone]    What is the best time to reach you: ANY     Who are you requesting to speak with (clinical staff, provider,  specific staff member): CLINICAL    Do you know the name of the person who called: GABRIELLA PT'S LUCINA    What was the call regarding: PT'S DAUGTHER STATES ANYA FORGOT TO SIGN OFF ON THE MEDICATION FOR HER MOTHERS INSULIN THAT ANYA PRESCRIBED FOR HER. PT'S DAUGHTER WOULD LIKE A CALL BACK ONCE THE PRESCRIPTION HAS BEEN FILLED. THANK YOU    Is it okay if the provider responds through MyChart: NO

## 2023-09-11 NOTE — TELEPHONE ENCOUNTER
Discussed Insulin with patients daughter Blue. Advised Insulin previously listed as a home medication prior to surgery. They are going to discuss with PCP. Per Blue patient has Lantus not Levemir. Blue states previously Lennie was on Lantus 20units nightly.

## 2023-09-12 ENCOUNTER — READMISSION MANAGEMENT (OUTPATIENT)
Dept: CALL CENTER | Facility: HOSPITAL | Age: 68
End: 2023-09-12
Payer: MEDICARE

## 2023-09-12 NOTE — OUTREACH NOTE
CT Surgery Week 1 Survey      Flowsheet Row Responses   [facility] patient discharged from? [place]   Does the patient have one of the following disease processes/diagnoses(primary or secondary)? Cardiothoracic surgery   Week 1 attempt successful? Yes   Call start time 1705   Call end time 1711   Discharge diagnosis Severe multi-vessel CAD -s/p CABGx4   Meds reviewed with patient/caregiver? Yes   Is the patient having any side effects they believe may be caused by any medication additions or changes? No   Does the patient have all medications related to this admission filled (includes all antibiotics, pain medications, cardiac medications, etc.) Yes   Is the patient taking all medications as directed (includes completed medication regime)? Yes   Does the patient have a primary care provider?  Yes   Does the patient have an appointment scheduled with their C/T surgeon? Yes   Has the patient kept scheduled appointments due by today? N/A   What is the Home health agency?  [facility]    Has home health visited the patient within 72 hours of discharge? Yes   What DME was ordered?  O2- [employer]   Has all DME been delivered? Yes   What is the patient's perception of their health status since discharge? Improving   Is the patient /caregiver able to teach back basic post-op care? Practice cough and deep breath every 4 hours while awake, Continue use of incentive spirometry at least 1 week post discharge, Hold pillow to support chest when coughing, Drive as instructed by MD in discharge instructions, Shower daily, Use a clean wash cloth and antibacterial bar or liquid soap to clean incisions, Lifting as instructed by MD in discharge instructions   Is the patient/caregiver able to teach back signs and symptoms of incisional infection? Increased redness, swelling or pain at the incisonal site, Increased drainage or bleeding, Incisional warmth, Pus or odor from incision, Fever   Is the patient/caregiver able to teach  back steps to recovery at home? Set small, achievable goals for return to baseline health, Rest and rebuild strength, gradually increase activity, Eat a well-balance diet   If the patient is a current smoker, are they able to teach back resources for cessation? Smoking cessation support groups, Smoking cessation medications, Smoking cessation classes   Is the patient/caregiver able to teach back the hierarchy of who to call/visit for symptoms/problems? PCP, Specialist, Home health nurse, Urgent Care, ED, 911 Yes   Additional teach back comments States she is doing ell but has been coughing.  She is using 2L of oxygen but is waiting on getting a pulse ox.  Home health has been in.   Week 1 call completed? Yes   Wrap up additional comments Denies questions or needs at this time.   Call end time 1711              Nga PATE - Licensed Nurse

## 2023-09-19 ENCOUNTER — READMISSION MANAGEMENT (OUTPATIENT)
Dept: CALL CENTER | Facility: HOSPITAL | Age: 68
End: 2023-09-19
Payer: MEDICARE

## 2023-09-19 NOTE — OUTREACH NOTE
CT Surgery Week 2 Survey      Flowsheet Row Responses   [facility] patient discharged from? [place]   Does the patient have one of the following disease processes/diagnoses(primary or secondary)? Cardiothoracic surgery   Week 2 attempt successful? Yes   Call start time 1516   Call end time 1521   Discharge diagnosis Severe multi-vessel CAD -s/p CABGx4   Person spoke with today (if not patient) and relationship Blue- Daughter   Meds reviewed with patient/caregiver? Yes   Does the patient have all medications related to this admission filled (includes all antibiotics, pain medications, cardiac medications, etc.) Yes   Prescription comments 09/11 - 230, pcp recommended 20 on insulin. dropped to 113 yesterday 206, 194 today no insulin since 09/15   Is the patient taking all medications as directed (includes completed medication regime)? Yes   Does the patient have a primary care provider?  Yes   Comments regarding PCP family has been in close contact with pcp.   Has the patient kept scheduled appointments due by today? N/A   [facility]   Has home health visited the patient within 72 hours of discharge? Yes   What DME was ordered?  O2- [employer]   Has all DME been delivered? Yes   Did the patient receive a copy of their discharge instructions? Yes   Nursing interventions Reviewed instructions with patient   What is the patient's perception of their health status since discharge? Improving   Is the patient /caregiver able to teach back basic post-op care? Practice cough and deep breath every 4 hours while awake, Continue use of incentive spirometry at least 1 week post discharge, Hold pillow to support chest when coughing, Drive as instructed by MD in discharge instructions, Shower daily, Use a clean wash cloth and antibacterial bar or liquid soap to clean incisions, Lifting as instructed by MD in discharge instructions   Is the patient/caregiver able to teach back signs and  symptoms of incisional infection? Increased redness, swelling or pain at the incisonal site, Increased drainage or bleeding, Incisional warmth, Pus or odor from incision, Fever   Is the patient/caregiver able to teach back steps to recovery at home? Set small, achievable goals for return to baseline health, Rest and rebuild strength, gradually increase activity, Eat a well-balance diet   Is the patient/caregiver able to teach back the hierarchy of who to call/visit for symptoms/problems? PCP, Specialist, Home health nurse, Urgent Care, ED, 911 Yes   Week 2 call completed? Yes   Is the patient interested in additional calls from an ambulatory ? No   Would this patient benefit from a Referral to [facility] Social Work? No   Wrap up additional comments Daughter states her mother is doing well no s/s of infection noted. no concerns or questions noted.   Call end time 1521            Kimberly GALDAMEZ - Registered Nurse

## 2023-09-25 ENCOUNTER — TELEPHONE (OUTPATIENT)
Dept: CARDIOLOGY | Facility: CLINIC | Age: 68
End: 2023-09-25
Payer: MEDICARE

## 2023-09-25 NOTE — TELEPHONE ENCOUNTER
Procedure: Left Trans carotid stent placement    Medication Directive: NA    PMH: CAD, HTN, HLD    Last Seen:  06/30/23    SCOTT: 09/01/23  Echocardiogram Comments:       Severe concentric LV hypertrophy, appears low normal function, LVEF 50-55%.  Normal RV function.   Valves normal.      Post CPB:  S/p CABG  Hyperdynamic LV function, LVEF 65%, inferoseptal hypokinesis  Valves unchanged.   No aortic dissection post decannulation.      LHC: 08/23/23    The ostial circumflex certainly looks significant along with probably the proximal circumflex.  The mid LAD and RCA have severe stenosis.  I would like the patient evaluated for possible coronary artery bypass grafting.  I have asked the cardiothoracic surgeons to look at her films.  They agree that especially with this ostial left circumflex stenosis that she would benefit from coronary artery bypass grafting.  We feel she can be discharged from a cardiac standpoint as she is asymptomatic.  They are going to get her in in the next couple days for evaluation and to schedule the bypass.  Goal-directed medical therapy and risk factor reduction.  The cardiothoracic surgeons are going to evaluate her carotid stenosis prior to this surgery and keep in touch with the vascular surgeons at [facility].

## 2023-09-26 NOTE — TELEPHONE ENCOUNTER
Patient is intermediate risk for perioperative cardiac complications per Dr. Sprague. Ok to proceed.

## 2023-10-04 ENCOUNTER — OFFICE VISIT (OUTPATIENT)
Dept: CARDIAC SURGERY | Facility: CLINIC | Age: 68
End: 2023-10-04
Payer: MEDICARE

## 2023-10-04 VITALS
HEIGHT: 62 IN | OXYGEN SATURATION: 98 % | DIASTOLIC BLOOD PRESSURE: 61 MMHG | TEMPERATURE: 97.7 F | BODY MASS INDEX: 26.31 KG/M2 | SYSTOLIC BLOOD PRESSURE: 108 MMHG | RESPIRATION RATE: 18 BRPM | HEART RATE: 69 BPM | WEIGHT: 143 LBS

## 2023-10-04 DIAGNOSIS — Z95.1 S/P CABG X 4: Primary | ICD-10-CM

## 2023-10-04 PROCEDURE — 1160F RVW MEDS BY RX/DR IN RCRD: CPT | Performed by: NURSE PRACTITIONER

## 2023-10-04 PROCEDURE — 3078F DIAST BP <80 MM HG: CPT | Performed by: NURSE PRACTITIONER

## 2023-10-04 PROCEDURE — 1159F MED LIST DOCD IN RCRD: CPT | Performed by: NURSE PRACTITIONER

## 2023-10-04 PROCEDURE — 99024 POSTOP FOLLOW-UP VISIT: CPT | Performed by: NURSE PRACTITIONER

## 2023-10-04 PROCEDURE — 3074F SYST BP LT 130 MM HG: CPT | Performed by: NURSE PRACTITIONER

## 2023-10-04 RX ORDER — METOPROLOL SUCCINATE 25 MG/1
12.5 TABLET, EXTENDED RELEASE ORAL EVERY 12 HOURS SCHEDULED
Qty: 30 TABLET | Refills: 2 | Status: SHIPPED | OUTPATIENT
Start: 2023-10-04 | End: 2024-01-02

## 2023-10-04 RX ORDER — BUMETANIDE 2 MG/1
2 TABLET ORAL DAILY
Qty: 30 TABLET | Refills: 0 | Status: SHIPPED | OUTPATIENT
Start: 2023-10-04 | End: 2023-11-03

## 2023-10-04 RX ORDER — POTASSIUM CHLORIDE 20 MEQ/1
20 TABLET, EXTENDED RELEASE ORAL DAILY
Qty: 30 TABLET | Refills: 0 | Status: SHIPPED | OUTPATIENT
Start: 2023-10-04 | End: 2023-11-03

## 2023-10-04 RX ORDER — POTASSIUM CHLORIDE 20 MEQ/1
TABLET, EXTENDED RELEASE ORAL
Qty: 90 TABLET | OUTPATIENT
Start: 2023-10-04

## 2023-10-04 RX ORDER — BUMETANIDE 2 MG/1
TABLET ORAL
Qty: 90 TABLET | OUTPATIENT
Start: 2023-10-04

## 2023-10-04 NOTE — PROGRESS NOTES
"CARDIOVASCULAR SURGERY FOLLOW-UP PROGRESS NOTE  Chief Complaint: post op    HPI:   Dear Dr. Patterson, HERVE Ferrer and colleagues:    It was nice to see Lennie Gomez in follow up 4 weeks after surgery.  As you know, she is a 68 y.o. female with a history of CAD, ICM, hypertension, hyperlipidemia, DM II, bilateral carotid stenosis  who underwent CABG x4 utilizing LIMA/RSVG with Dr. Aaron on 9/1/23 (see op note for full report). She did well postoperatively and continues to do well. She had a failed overnight oximetry and she was discharge home with nocturnal oxygen. Since discharge she has undergone left carotid stent placement on 9/29, and reports doing well since then. She comes in today complaining of nothing. She denies chest pain, dizziness, shortness of breath, or edema.  Her activity level has been good. She is currently receiving in home PT. From a surgical standpoint, the sternal incision is well approximated without erythema, edema, or drainage. The sternum is stable to palpation, and the patient denies any popping or clicking with deep inspiration or coughing.  She is requesting medication refills. She is scheduled to see cardiology in a few weeks. She has not smoked since surgery!    Physical Exam:         /61 (BP Location: Right arm, Patient Position: Sitting, Cuff Size: Adult)   Pulse 69   Temp 97.7 °F (36.5 °C)   Resp 18   Ht 157.5 cm (62\")   Wt 64.9 kg (143 lb)   SpO2 98%   BMI 26.16 kg/m²   Heart:  regular rate and rhythm, S1, S2 normal, no murmur, click, rub or gallop  Lungs:  clear to auscultation bilaterally  Extremities:  no edema  Incision(s):  mid chest healing well, no significant drainage, no dehiscence, and no significant erythema. There is mild scabbing along the entirety of the sternal incision. Right leg healing well, no significant drainage, no dehiscence, and no significant erythema.  left neck healing well, no significant drainage, no dehiscence with intact skin " glue    Assessment/Plan:     S/P CABG. Overall, she is doing well.    Post-op atrial fibrillation. Now in SR. Continued on beta blocker. Finish 1 month course of amiodarone, and then discontinue  Post op respiratory insufficiency--discharged home on nocturnal oxygen. Recommend follow up with PCP for possible outpatient sleep study  Refills of metoprolol, Bumex, and potassium sent to the pharmacy    No heavy lifting > 10 pounds for 2 more weeks  Keep incisions clean and dry  OK to begin cardiac rehab  Follow-up as scheduled with cardiology  Follow-up as scheduled with PCP  Follow-up with CT surgery prn    Continue lifting restriction of 10 lbs until 6 weeks and 50 lbs until 12 weeks from the date of surgery, no excessive jarring motions or twisting motions until 12 weeks from the date of surgery    Return to clinic if any signs or symptoms of infection or sternal instability develop     Thank you for allowing me to participate in the care of your patient.    Regards,  HERVE Garza

## 2023-11-08 RX ORDER — METOPROLOL SUCCINATE 25 MG/1
12.5 TABLET, EXTENDED RELEASE ORAL EVERY 12 HOURS
Qty: 30 TABLET | Refills: 2 | OUTPATIENT
Start: 2023-11-08

## 2023-11-08 RX ORDER — BUMETANIDE 2 MG/1
2 TABLET ORAL DAILY
Qty: 30 TABLET | Refills: 0 | OUTPATIENT
Start: 2023-11-08

## 2023-11-13 PROCEDURE — B01B1ZZ FLUOROSCOPY OF SPINAL CORD USING LOW OSMOLAR CONTRAST: ICD-10-PCS | Performed by: RADIOLOGY

## 2023-11-13 PROCEDURE — 009U3ZX DRAINAGE OF SPINAL CANAL, PERCUTANEOUS APPROACH, DIAGNOSTIC: ICD-10-PCS | Performed by: RADIOLOGY

## 2023-11-14 ENCOUNTER — APPOINTMENT (OUTPATIENT)
Dept: CT IMAGING | Facility: HOSPITAL | Age: 68
DRG: 056 | End: 2023-11-14
Payer: MEDICARE

## 2023-11-14 ENCOUNTER — APPOINTMENT (OUTPATIENT)
Dept: GENERAL RADIOLOGY | Facility: HOSPITAL | Age: 68
DRG: 056 | End: 2023-11-14
Payer: MEDICARE

## 2023-11-14 ENCOUNTER — HOSPITAL ENCOUNTER (INPATIENT)
Facility: HOSPITAL | Age: 68
LOS: 6 days | Discharge: NURSING, REHAB OR LONG-TERM CARE | DRG: 056 | End: 2023-11-20
Attending: EMERGENCY MEDICINE | Admitting: STUDENT IN AN ORGANIZED HEALTH CARE EDUCATION/TRAINING PROGRAM
Payer: MEDICARE

## 2023-11-14 ENCOUNTER — APPOINTMENT (OUTPATIENT)
Dept: MRI IMAGING | Facility: HOSPITAL | Age: 68
DRG: 056 | End: 2023-11-14
Payer: MEDICARE

## 2023-11-14 ENCOUNTER — APPOINTMENT (OUTPATIENT)
Dept: CARDIOLOGY | Facility: HOSPITAL | Age: 68
DRG: 056 | End: 2023-11-14
Payer: MEDICARE

## 2023-11-14 DIAGNOSIS — R56.9 SEIZURES: Primary | ICD-10-CM

## 2023-11-14 DIAGNOSIS — R26.2 DIFFICULTY WALKING: ICD-10-CM

## 2023-11-14 DIAGNOSIS — Z78.9 DECREASED ACTIVITIES OF DAILY LIVING (ADL): ICD-10-CM

## 2023-11-14 DIAGNOSIS — R26.2 DIFFICULTY IN WALKING: ICD-10-CM

## 2023-11-14 DIAGNOSIS — R13.12 OROPHARYNGEAL DYSPHAGIA: ICD-10-CM

## 2023-11-14 PROBLEM — I63.9 CVA (CEREBRAL VASCULAR ACCIDENT): Status: ACTIVE | Noted: 2023-11-14

## 2023-11-14 LAB
ACETONE BLD QL: NEGATIVE
ALBUMIN SERPL-MCNC: 4.8 G/DL (ref 3.5–5.2)
ALBUMIN/GLOB SERPL: 1.4 G/DL
ALP SERPL-CCNC: 114 U/L (ref 39–117)
ALT SERPL W P-5'-P-CCNC: 34 U/L (ref 1–33)
ANION GAP SERPL CALCULATED.3IONS-SCNC: 23.8 MMOL/L (ref 5–15)
APTT PPP: 21.9 SECONDS (ref 24.2–34.2)
ARTERIAL PATENCY WRIST A: POSITIVE
AST SERPL-CCNC: 37 U/L (ref 1–32)
BASE EXCESS BLDA CALC-SCNC: 0.6 MMOL/L (ref -2–2)
BASOPHILS # BLD AUTO: 0.06 10*3/MM3 (ref 0–0.2)
BASOPHILS NFR BLD AUTO: 0.6 % (ref 0–1.5)
BDY SITE: ABNORMAL
BILIRUB SERPL-MCNC: 0.3 MG/DL (ref 0–1.2)
BILIRUB UR QL STRIP: NEGATIVE
BUN SERPL-MCNC: 12 MG/DL (ref 8–23)
BUN/CREAT SERPL: 12.8 (ref 7–25)
CALCIUM SPEC-SCNC: 9.2 MG/DL (ref 8.6–10.5)
CHLORIDE SERPL-SCNC: 96 MMOL/L (ref 98–107)
CLARITY UR: CLEAR
CO2 SERPL-SCNC: 18.2 MMOL/L (ref 22–29)
COHGB MFR BLD: 0.3 % (ref 0–1.5)
COLOR UR: YELLOW
CREAT SERPL-MCNC: 0.94 MG/DL (ref 0.57–1)
D-LACTATE SERPL-SCNC: 1.6 MMOL/L (ref 0.5–2)
D-LACTATE SERPL-SCNC: 12.6 MMOL/L (ref 0.5–2)
D-LACTATE SERPL-SCNC: 2.8 MMOL/L (ref 0.5–2)
D-LACTATE SERPL-SCNC: 3.9 MMOL/L (ref 0.5–2)
DEPRECATED RDW RBC AUTO: 54.7 FL (ref 37–54)
EGFRCR SERPLBLD CKD-EPI 2021: 66.6 ML/MIN/1.73
EOSINOPHIL # BLD AUTO: 0.02 10*3/MM3 (ref 0–0.4)
EOSINOPHIL NFR BLD AUTO: 0.2 % (ref 0.3–6.2)
ERYTHROCYTE [DISTWIDTH] IN BLOOD BY AUTOMATED COUNT: 14.6 % (ref 12.3–15.4)
FHHB: 2.4 % (ref 0–5)
FLUAV SUBTYP SPEC NAA+PROBE: NOT DETECTED
FLUBV RNA ISLT QL NAA+PROBE: NOT DETECTED
GAS FLOW AIRWAY: 4 LPM
GLOBULIN UR ELPH-MCNC: 3.4 GM/DL
GLUCOSE BLDC GLUCOMTR-MCNC: 194 MG/DL (ref 70–99)
GLUCOSE BLDC GLUCOMTR-MCNC: 251 MG/DL (ref 70–99)
GLUCOSE BLDC GLUCOMTR-MCNC: 342 MG/DL (ref 70–99)
GLUCOSE SERPL-MCNC: 393 MG/DL (ref 65–99)
GLUCOSE UR STRIP-MCNC: ABNORMAL MG/DL
HCO3 BLDA-SCNC: 25.6 MMOL/L (ref 22–26)
HCT VFR BLD AUTO: 40.3 % (ref 34–46.6)
HGB BLD-MCNC: 12.1 G/DL (ref 12–15.9)
HGB BLDA-MCNC: 12.3 G/DL (ref 11.7–14.6)
HGB UR QL STRIP.AUTO: NEGATIVE
HOLD SPECIMEN: NORMAL
HOLD SPECIMEN: NORMAL
IMM GRANULOCYTES # BLD AUTO: 0.06 10*3/MM3 (ref 0–0.05)
IMM GRANULOCYTES NFR BLD AUTO: 0.6 % (ref 0–0.5)
INHALED O2 CONCENTRATION: 36 %
INR PPP: 1.11 (ref 0.86–1.15)
KETONES UR QL STRIP: ABNORMAL
L PNEUMO1 AG UR QL IA: NEGATIVE
LEUKOCYTE ESTERASE UR QL STRIP.AUTO: NEGATIVE
LYMPHOCYTES # BLD AUTO: 2.2 10*3/MM3 (ref 0.7–3.1)
LYMPHOCYTES NFR BLD AUTO: 21.6 % (ref 19.6–45.3)
MAGNESIUM SERPL-MCNC: 1.9 MG/DL (ref 1.6–2.4)
MCH RBC QN AUTO: 30 PG (ref 26.6–33)
MCHC RBC AUTO-ENTMCNC: 30 G/DL (ref 31.5–35.7)
MCV RBC AUTO: 100 FL (ref 79–97)
METHGB BLD QL: 0.2 % (ref 0–1.5)
MODALITY: ABNORMAL
MONOCYTES # BLD AUTO: 0.38 10*3/MM3 (ref 0.1–0.9)
MONOCYTES NFR BLD AUTO: 3.7 % (ref 5–12)
NEUTROPHILS NFR BLD AUTO: 7.48 10*3/MM3 (ref 1.7–7)
NEUTROPHILS NFR BLD AUTO: 73.3 % (ref 42.7–76)
NITRITE UR QL STRIP: NEGATIVE
NRBC BLD AUTO-RTO: 0 /100 WBC (ref 0–0.2)
OXYHGB MFR BLDV: 97.1 % (ref 94–99)
PCO2 BLDA: 42.5 MM HG (ref 35–45)
PH BLDA: 7.4 PH UNITS (ref 7.35–7.45)
PH UR STRIP.AUTO: 6.5 [PH] (ref 5–8)
PLATELET # BLD AUTO: 316 10*3/MM3 (ref 140–450)
PMV BLD AUTO: 10.3 FL (ref 6–12)
PO2 BLD: 305 MM[HG] (ref 0–500)
PO2 BLDA: 109.8 MM HG (ref 80–100)
POTASSIUM SERPL-SCNC: 4.3 MMOL/L (ref 3.5–5.2)
PROCALCITONIN SERPL-MCNC: 0.05 NG/ML (ref 0–0.25)
PROT SERPL-MCNC: 8.2 G/DL (ref 6–8.5)
PROT UR QL STRIP: NEGATIVE
PROTHROMBIN TIME: 14.6 SECONDS (ref 11.8–14.9)
RBC # BLD AUTO: 4.03 10*6/MM3 (ref 3.77–5.28)
RSV RNA NPH QL NAA+NON-PROBE: NOT DETECTED
S PNEUM AG SPEC QL LA: NEGATIVE
SAO2 % BLDCOA: 97.6 % (ref 95–99)
SARS-COV-2 RNA RESP QL NAA+PROBE: NOT DETECTED
SODIUM SERPL-SCNC: 138 MMOL/L (ref 136–145)
SP GR UR STRIP: >1.03 (ref 1–1.03)
TROPONIN T SERPL HS-MCNC: 18 NG/L
UROBILINOGEN UR QL STRIP: ABNORMAL
WBC NRBC COR # BLD: 10.2 10*3/MM3 (ref 3.4–10.8)
WHOLE BLOOD HOLD COAG: NORMAL
WHOLE BLOOD HOLD SPECIMEN: NORMAL

## 2023-11-14 PROCEDURE — 36415 COLL VENOUS BLD VENIPUNCTURE: CPT

## 2023-11-14 PROCEDURE — 87040 BLOOD CULTURE FOR BACTERIA: CPT | Performed by: EMERGENCY MEDICINE

## 2023-11-14 PROCEDURE — 99285 EMERGENCY DEPT VISIT HI MDM: CPT

## 2023-11-14 PROCEDURE — 81003 URINALYSIS AUTO W/O SCOPE: CPT | Performed by: EMERGENCY MEDICINE

## 2023-11-14 PROCEDURE — 25010000002 AMPICILLIN PER 500 MG: Performed by: INTERNAL MEDICINE

## 2023-11-14 PROCEDURE — 82948 REAGENT STRIP/BLOOD GLUCOSE: CPT

## 2023-11-14 PROCEDURE — 87449 NOS EACH ORGANISM AG IA: CPT | Performed by: INTERNAL MEDICINE

## 2023-11-14 PROCEDURE — 25010000002 LEVETIRACETAM IN NACL 0.75% 1000 MG/100ML SOLUTION: Performed by: EMERGENCY MEDICINE

## 2023-11-14 PROCEDURE — 71045 X-RAY EXAM CHEST 1 VIEW: CPT

## 2023-11-14 PROCEDURE — 63710000001 INSULIN LISPRO (HUMAN) PER 5 UNITS: Performed by: INTERNAL MEDICINE

## 2023-11-14 PROCEDURE — 25010000002 LORAZEPAM PER 2 MG: Performed by: EMERGENCY MEDICINE

## 2023-11-14 PROCEDURE — B01B1ZZ FLUOROSCOPY OF SPINAL CORD USING LOW OSMOLAR CONTRAST: ICD-10-PCS | Performed by: RADIOLOGY

## 2023-11-14 PROCEDURE — 70498 CT ANGIOGRAPHY NECK: CPT

## 2023-11-14 PROCEDURE — 87899 AGENT NOS ASSAY W/OPTIC: CPT | Performed by: INTERNAL MEDICINE

## 2023-11-14 PROCEDURE — 0042T HC CT CEREBRAL PERFUSION W/WO CONTRAST: CPT

## 2023-11-14 PROCEDURE — 80053 COMPREHEN METABOLIC PANEL: CPT | Performed by: EMERGENCY MEDICINE

## 2023-11-14 PROCEDURE — 83605 ASSAY OF LACTIC ACID: CPT | Performed by: EMERGENCY MEDICINE

## 2023-11-14 PROCEDURE — 25010000002 LORAZEPAM PER 2 MG

## 2023-11-14 PROCEDURE — 93010 ELECTROCARDIOGRAM REPORT: CPT | Performed by: INTERNAL MEDICINE

## 2023-11-14 PROCEDURE — 85610 PROTHROMBIN TIME: CPT | Performed by: EMERGENCY MEDICINE

## 2023-11-14 PROCEDURE — 82009 KETONE BODYS QUAL: CPT | Performed by: EMERGENCY MEDICINE

## 2023-11-14 PROCEDURE — 70450 CT HEAD/BRAIN W/O DYE: CPT

## 2023-11-14 PROCEDURE — 84484 ASSAY OF TROPONIN QUANT: CPT | Performed by: EMERGENCY MEDICINE

## 2023-11-14 PROCEDURE — 82375 ASSAY CARBOXYHB QUANT: CPT | Performed by: INTERNAL MEDICINE

## 2023-11-14 PROCEDURE — 36415 COLL VENOUS BLD VENIPUNCTURE: CPT | Performed by: EMERGENCY MEDICINE

## 2023-11-14 PROCEDURE — 25810000003 SODIUM CHLORIDE 0.9 % SOLUTION: Performed by: INTERNAL MEDICINE

## 2023-11-14 PROCEDURE — A9577 INJ MULTIHANCE: HCPCS | Performed by: INTERNAL MEDICINE

## 2023-11-14 PROCEDURE — 25010000002 VANCOMYCIN 5 G RECONSTITUTED SOLUTION: Performed by: INTERNAL MEDICINE

## 2023-11-14 PROCEDURE — 0 GADOBENATE DIMEGLUMINE 529 MG/ML SOLUTION: Performed by: INTERNAL MEDICINE

## 2023-11-14 PROCEDURE — 25810000003 SODIUM CHLORIDE 0.9 % SOLUTION 500 ML FLEX CONT: Performed by: INTERNAL MEDICINE

## 2023-11-14 PROCEDURE — 25010000002 ONDANSETRON PER 1 MG: Performed by: EMERGENCY MEDICINE

## 2023-11-14 PROCEDURE — 25010000002 LEVETIRACETAM IN NACL 0.75% 1000 MG/100ML SOLUTION: Performed by: INTERNAL MEDICINE

## 2023-11-14 PROCEDURE — 25810000003 LACTATED RINGERS SOLUTION: Performed by: INTERNAL MEDICINE

## 2023-11-14 PROCEDURE — 25010000002 LABETALOL 5 MG/ML SOLUTION: Performed by: EMERGENCY MEDICINE

## 2023-11-14 PROCEDURE — 25010000002 CEFTRIAXONE PER 250 MG: Performed by: INTERNAL MEDICINE

## 2023-11-14 PROCEDURE — 99223 1ST HOSP IP/OBS HIGH 75: CPT | Performed by: INTERNAL MEDICINE

## 2023-11-14 PROCEDURE — 87637 SARSCOV2&INF A&B&RSV AMP PRB: CPT | Performed by: INTERNAL MEDICINE

## 2023-11-14 PROCEDURE — 25510000001 IOPAMIDOL PER 1 ML: Performed by: EMERGENCY MEDICINE

## 2023-11-14 PROCEDURE — 82805 BLOOD GASES W/O2 SATURATION: CPT | Performed by: INTERNAL MEDICINE

## 2023-11-14 PROCEDURE — 99222 1ST HOSP IP/OBS MODERATE 55: CPT | Performed by: PSYCHIATRY & NEUROLOGY

## 2023-11-14 PROCEDURE — 93005 ELECTROCARDIOGRAM TRACING: CPT | Performed by: EMERGENCY MEDICINE

## 2023-11-14 PROCEDURE — 84145 PROCALCITONIN (PCT): CPT | Performed by: INTERNAL MEDICINE

## 2023-11-14 PROCEDURE — 85730 THROMBOPLASTIN TIME PARTIAL: CPT | Performed by: EMERGENCY MEDICINE

## 2023-11-14 PROCEDURE — 36600 WITHDRAWAL OF ARTERIAL BLOOD: CPT | Performed by: INTERNAL MEDICINE

## 2023-11-14 PROCEDURE — 83735 ASSAY OF MAGNESIUM: CPT | Performed by: EMERGENCY MEDICINE

## 2023-11-14 PROCEDURE — 85025 COMPLETE CBC W/AUTO DIFF WBC: CPT | Performed by: EMERGENCY MEDICINE

## 2023-11-14 PROCEDURE — 25810000003 SODIUM CHLORIDE 0.9 % SOLUTION: Performed by: EMERGENCY MEDICINE

## 2023-11-14 PROCEDURE — 70496 CT ANGIOGRAPHY HEAD: CPT

## 2023-11-14 PROCEDURE — 70553 MRI BRAIN STEM W/O & W/DYE: CPT

## 2023-11-14 PROCEDURE — 83050 HGB METHEMOGLOBIN QUAN: CPT | Performed by: INTERNAL MEDICINE

## 2023-11-14 PROCEDURE — 009U3ZX DRAINAGE OF SPINAL CANAL, PERCUTANEOUS APPROACH, DIAGNOSTIC: ICD-10-PCS | Performed by: RADIOLOGY

## 2023-11-14 RX ORDER — SODIUM CHLORIDE 0.9 % (FLUSH) 0.9 %
10 SYRINGE (ML) INJECTION EVERY 12 HOURS SCHEDULED
Status: DISCONTINUED | OUTPATIENT
Start: 2023-11-14 | End: 2023-11-20 | Stop reason: HOSPADM

## 2023-11-14 RX ORDER — ASPIRIN 300 MG/1
300 SUPPOSITORY RECTAL DAILY
Status: DISCONTINUED | OUTPATIENT
Start: 2023-11-14 | End: 2023-11-20 | Stop reason: HOSPADM

## 2023-11-14 RX ORDER — IBUPROFEN 600 MG/1
1 TABLET ORAL
Status: DISCONTINUED | OUTPATIENT
Start: 2023-11-14 | End: 2023-11-20 | Stop reason: HOSPADM

## 2023-11-14 RX ORDER — NICOTINE POLACRILEX 4 MG
15 LOZENGE BUCCAL
Status: DISCONTINUED | OUTPATIENT
Start: 2023-11-14 | End: 2023-11-20 | Stop reason: HOSPADM

## 2023-11-14 RX ORDER — LORAZEPAM 2 MG/ML
2 INJECTION INTRAMUSCULAR ONCE
Status: COMPLETED | OUTPATIENT
Start: 2023-11-14 | End: 2023-11-14

## 2023-11-14 RX ORDER — POTASSIUM CHLORIDE 1500 MG/1
20 TABLET, EXTENDED RELEASE ORAL DAILY
COMMUNITY
Start: 2023-11-07

## 2023-11-14 RX ORDER — ONDANSETRON 2 MG/ML
4 INJECTION INTRAMUSCULAR; INTRAVENOUS ONCE
Status: COMPLETED | OUTPATIENT
Start: 2023-11-14 | End: 2023-11-14

## 2023-11-14 RX ORDER — ASPIRIN 81 MG/1
81 TABLET, CHEWABLE ORAL DAILY
Status: DISCONTINUED | OUTPATIENT
Start: 2023-11-14 | End: 2023-11-20 | Stop reason: HOSPADM

## 2023-11-14 RX ORDER — LEVETIRACETAM 10 MG/ML
1000 INJECTION INTRAVASCULAR ONCE
Status: COMPLETED | OUTPATIENT
Start: 2023-11-14 | End: 2023-11-14

## 2023-11-14 RX ORDER — LORAZEPAM 2 MG/ML
INJECTION INTRAMUSCULAR
Status: DISPENSED
Start: 2023-11-14 | End: 2023-11-15

## 2023-11-14 RX ORDER — SODIUM CHLORIDE 0.9 % (FLUSH) 0.9 %
10 SYRINGE (ML) INJECTION AS NEEDED
Status: DISCONTINUED | OUTPATIENT
Start: 2023-11-14 | End: 2023-11-20 | Stop reason: HOSPADM

## 2023-11-14 RX ORDER — LORAZEPAM 2 MG/ML
INJECTION INTRAMUSCULAR
Status: COMPLETED
Start: 2023-11-14 | End: 2023-11-14

## 2023-11-14 RX ORDER — SERTRALINE HYDROCHLORIDE 25 MG/1
25 TABLET, FILM COATED ORAL DAILY
COMMUNITY
Start: 2023-11-07

## 2023-11-14 RX ORDER — ATORVASTATIN CALCIUM 40 MG/1
80 TABLET, FILM COATED ORAL NIGHTLY
Status: DISCONTINUED | OUTPATIENT
Start: 2023-11-14 | End: 2023-11-20 | Stop reason: HOSPADM

## 2023-11-14 RX ORDER — INSULIN LISPRO 100 [IU]/ML
2-7 INJECTION, SOLUTION INTRAVENOUS; SUBCUTANEOUS
Status: DISCONTINUED | OUTPATIENT
Start: 2023-11-14 | End: 2023-11-14

## 2023-11-14 RX ORDER — LEVETIRACETAM 10 MG/ML
1000 INJECTION INTRAVASCULAR EVERY 12 HOURS SCHEDULED
Status: DISCONTINUED | OUTPATIENT
Start: 2023-11-14 | End: 2023-11-19

## 2023-11-14 RX ORDER — METOPROLOL SUCCINATE 100 MG/1
100 TABLET, EXTENDED RELEASE ORAL DAILY
COMMUNITY
Start: 2023-11-06

## 2023-11-14 RX ORDER — DEXTROSE MONOHYDRATE 25 G/50ML
25 INJECTION, SOLUTION INTRAVENOUS
Status: DISCONTINUED | OUTPATIENT
Start: 2023-11-14 | End: 2023-11-20 | Stop reason: HOSPADM

## 2023-11-14 RX ORDER — SODIUM CHLORIDE 9 MG/ML
40 INJECTION, SOLUTION INTRAVENOUS AS NEEDED
Status: DISCONTINUED | OUTPATIENT
Start: 2023-11-14 | End: 2023-11-20 | Stop reason: HOSPADM

## 2023-11-14 RX ORDER — INSULIN LISPRO 100 [IU]/ML
2-7 INJECTION, SOLUTION INTRAVENOUS; SUBCUTANEOUS
Status: DISCONTINUED | OUTPATIENT
Start: 2023-11-14 | End: 2023-11-15

## 2023-11-14 RX ORDER — LOSARTAN POTASSIUM 100 MG/1
100 TABLET ORAL DAILY
COMMUNITY
Start: 2023-11-06

## 2023-11-14 RX ORDER — LABETALOL HYDROCHLORIDE 5 MG/ML
5 INJECTION, SOLUTION INTRAVENOUS ONCE
Status: COMPLETED | OUTPATIENT
Start: 2023-11-14 | End: 2023-11-14

## 2023-11-14 RX ADMIN — SODIUM CHLORIDE 1000 ML: 9 INJECTION, SOLUTION INTRAVENOUS at 10:37

## 2023-11-14 RX ADMIN — LEVETIRACETAM 1000 MG: 10 INJECTION, SOLUTION INTRAVENOUS at 22:47

## 2023-11-14 RX ADMIN — ASPIRIN 300 MG: 300 SUPPOSITORY RECTAL at 18:27

## 2023-11-14 RX ADMIN — VANCOMYCIN HYDROCHLORIDE 1250 MG: 500 INJECTION, POWDER, LYOPHILIZED, FOR SOLUTION INTRAVENOUS at 19:17

## 2023-11-14 RX ADMIN — ATORVASTATIN CALCIUM 80 MG: 40 TABLET, FILM COATED ORAL at 22:45

## 2023-11-14 RX ADMIN — LORAZEPAM 2 MG: 2 INJECTION INTRAMUSCULAR at 11:32

## 2023-11-14 RX ADMIN — IOPAMIDOL 150 ML: 755 INJECTION, SOLUTION INTRAVENOUS at 11:23

## 2023-11-14 RX ADMIN — LEVETIRACETAM 1000 MG: 10 INJECTION, SOLUTION INTRAVENOUS at 10:36

## 2023-11-14 RX ADMIN — LEVETIRACETAM 1000 MG: 1000 INJECTION, SOLUTION INTRAVENOUS at 12:34

## 2023-11-14 RX ADMIN — LORAZEPAM 2 MG: 2 INJECTION INTRAMUSCULAR; INTRAVENOUS at 11:59

## 2023-11-14 RX ADMIN — ONDANSETRON 4 MG: 2 INJECTION INTRAMUSCULAR; INTRAVENOUS at 11:26

## 2023-11-14 RX ADMIN — CEFTRIAXONE SODIUM 2000 MG: 2 INJECTION, POWDER, FOR SOLUTION INTRAMUSCULAR; INTRAVENOUS at 18:40

## 2023-11-14 RX ADMIN — SODIUM CHLORIDE, POTASSIUM CHLORIDE, SODIUM LACTATE AND CALCIUM CHLORIDE 1000 ML: 600; 310; 30; 20 INJECTION, SOLUTION INTRAVENOUS at 18:16

## 2023-11-14 RX ADMIN — LABETALOL HYDROCHLORIDE 5 MG: 5 INJECTION, SOLUTION INTRAVENOUS at 16:32

## 2023-11-14 RX ADMIN — INSULIN LISPRO 2 UNITS: 100 INJECTION, SOLUTION INTRAVENOUS; SUBCUTANEOUS at 22:46

## 2023-11-14 RX ADMIN — SODIUM CHLORIDE 40 ML: 9 INJECTION, SOLUTION INTRAVENOUS at 18:41

## 2023-11-14 RX ADMIN — SODIUM CHLORIDE 40 ML: 9 INJECTION, SOLUTION INTRAVENOUS at 18:17

## 2023-11-14 RX ADMIN — LORAZEPAM 2 MG: 2 INJECTION INTRAMUSCULAR; INTRAVENOUS at 11:32

## 2023-11-14 RX ADMIN — GADOBENATE DIMEGLUMINE 15 ML: 529 INJECTION, SOLUTION INTRAVENOUS at 17:04

## 2023-11-14 RX ADMIN — SODIUM CHLORIDE 1000 ML: 9 INJECTION, SOLUTION INTRAVENOUS at 11:35

## 2023-11-14 RX ADMIN — Medication 10 ML: at 22:47

## 2023-11-14 RX ADMIN — AMPICILLIN SODIUM 2 G: 2 INJECTION, POWDER, FOR SOLUTION INTRAVENOUS at 22:45

## 2023-11-14 RX ADMIN — INSULIN LISPRO 4 UNITS: 100 INJECTION, SOLUTION INTRAVENOUS; SUBCUTANEOUS at 18:26

## 2023-11-14 RX ADMIN — AMPICILLIN SODIUM 2 G: 2 INJECTION, POWDER, FOR SOLUTION INTRAVENOUS at 18:16

## 2023-11-14 NOTE — CONSULTS
[facility] TeleNeurology Consult Services    Stat Consult    Patient Name:   Lennie Gomez  YOB: 1955  Identification Number:   MRN - [id number]  Date of Service:   11/14/2023 14:19:33    Diagnosis:        R56.9 - Seizures    Impression  69 y/o woman with history of history htn, cad, dm, hld who was brought in new onset seizure and on exam is moving the right side less than the left. She is postictal and unable to participate in exam. No current seizure activity on Ceribel.    Is seizure provoked (hyperglycemia, ?DKA) or due to u/l structural abnormality in the brain.    Head ct shows age indeterminate left frontal hypodensity that seems to spare the cortex. Recommend stat MRI brain w/and w/o to see if stroke and if so is it acute vs chronic VS tumor.    Please continue keppra 1gm bid. When able, routine EEG.      Recommendations:  Our recommendations are outlined below.    Diagnostic Studies :  MRI brain w/wo contrast   (Concern for other intracranial pathology that requires MRI brain with contrast) Routine EEG    Medications :  keppra 1gm bid    Nursing Recommendations :  Maintain Euglycemia and EuthermiaNeuro checks q1-2 hrs during ICU stay if criticalOnce stable neuro checks q 4hrs    Seizure precautions :  Seizure precautions including no driving for state mandated time frame were discussed with patient with clear understanding    DVT Prophylaxis :  Choice of Primary Team    Disposition :  Neurology will follow      ----------------------------------------------------------------------------------------------------      Advanced Imaging:  CTA Head and Neck Completed.    CTP Completed.    LVO:No    Patient in not a candidate for AZALIA        Metrics:  [facility] Notification Time: 11/14/2023 14:17:38  Stamp Time: 11/14/2023 14:19:33  Callback Response Time: 11/14/2023 14:24:44    Primary Provider Notified of Diagnostic Impression and Management Plan on: 11/14/2023 15:05:55      CT  HEAD:  Reviewed  left frontal ?age of stroke vs edema. will need mri brain w/and w/o to better characterize      Labs  initial lactate 12.6 now 3.9/  cmp w/ anion gap 23.8      ----------------------------------------------------------------------------------------------------    Chief Complaint:  seizure    History of Present Illness:  Patient is a 68 year old Female.  Lennie Gomez is a 69 y/o woman with history of history htn, cad, dm, hld who was brought in new onset seizure.    Patient was found today in her bed at home, covered in vomit. EMS arrived and witnessed a seizure. She has had two seizures in the ED (last seizure was. She has recvd 4mg Ativan and 2g Kep    Dgtrs are present. 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 Not recent illness. She had recent 9/1 cabg 4 vessel and then on 9/29 left carotid stent placement. She quit smoking on 8/31/2023. They deny any h/o stroke.    LKN 11/13/2023 at 2100  MRS 0        Past Medical History:       Hypertension       Diabetes Mellitus       Hyperlipidemia       Coronary Artery Disease    Medications:    No Anticoagulant use   Antiplatelet use: Yes asa and plavix  Reviewed EMR for current medications    Allergies:   Reviewed    Social History:  Smoking: Former    Family History:    There is no family history of premature cerebrovascular disease pertinent to this consultation    ROS :  14 Points Review of Systems was performed and was negative except mentioned in HPI.    Past Surgical History:  There Is No Surgical History Contributory To Today’s Visit    NIHSS may not be reliable due to: multiple sz today, recvd ativan 4mg, 2g keppra  Examination:  BP(190/78), Pulse(113), Blood Glucose(393)  1A: Level of Consciousness - Movements to Pain + 2  1B: Ask Month and Age - Could Not Answer Either Question Correctly + 2  1C: Blink Eyes & Squeeze Hands - Performs 0 Tasks + 2  2: Test Horizontal Extraocular Movements - Normal + 0  3: Test Visual Fields - No  Visual Loss + 0  4: Test Facial Palsy (Use Grimace if Obtunded) - Normal symmetry + 0  5A: Test Left Arm Motor Drift - No Effort Against Gravity + 3  5B: Test Right Arm Motor Drift - No Movement + 4  6A: Test Left Leg Motor Drift - No Effort Against Gravity + 3  6B: Test Right Leg Motor Drift - No Effort Against Gravity + 3  7: Test Limb Ataxia (FNF/Heel-Shin) - No Ataxia + 0  8: Test Sensation - Complete Loss: Cannot Sense Being Touched At All + 2  9: Test Language/Aphasia - Mute/Global Aphasia: No Usable Speech/Auditory Comprehension + 3  10: Test Dysarthria - Mute/Anarthric + 2  11: Test Extinction/Inattention - No abnormality + 0    NIHSS Score: 26    Spoke with : Allinder        Patient / Family was informed the Neurology Consult would occur via TeleHealth consult by way of interactive audio and video telecommunications and consented to receiving care in this manner.    Patient is being evaluated for possible acute neurologic impairment and high probability of imminent or life - threatening deterioration.I spent total of 35 minutes providing care to this patient, including time for face to face visit via telemedicine, review of medical records, imaging studies and discussion of findings with providers, the patient and / or family.      Dr Camilla Oseguera      [facility]  For Inpatient follow-up with [facility] physician please call [facility] [phone]. This is not an outpatient service. Post hospital discharge, please contact hospital directly.

## 2023-11-14 NOTE — H&P
[facility] HISTORY AND PHYSICAL  Date: 2023   Patient Name: Lennie Gomez  : 1955  MRN: [id number]  Primary Care Physician:  Jacqui Patterson APRN  Date of admission: 2023    Subjective   Subjective     Chief Complaint: Found down by family member  HPI:    Lennie Gomez is a 68 y.o. female past medical history of coronary disease with recent CABG, carotid artery stenosis with stent, anxiety/depression, obstructive sleep apnea, type 2 diabetes, and seizure disorder presents after being found down    History is obtained primarily from the patient and the ER physician.  It sounds like her grandson found her down with vomitus all around her pillow.  Then EMS showed up and the patient had another episode look like a tonic-clonic seizure.  At that time she did urinate herself per her family were.  She was placed in the ambulance and brought to the emergency department.  He had no fevers.  No chills.  No other infectious symptoms.    Vital signs are as follows temperature 97.4, pulse 109, blood pressure 190/92, satting well on room air.  CBC shows no specific abnormalities.  CMP shows an anion gap metabolic acidosis with a bicarb of 18.2 and anion gap of 23 with a lactate of 12.6.  Urinalysis is bland with exception of elevated glucose and trace ketones.  Chest x-ray shows no abnormalities.  CTA of the head shows no significant change in right frontal lobe arteriovenous malfunction.  No evidence of hemorrhage.  CTA of the neck interval placement of left internal carotid artery stent there is approximately 61% stenosis of the left internal carotid artery due to the deformity of the stent.  65% stenosis of the right internal carotid artery due to combination of calcified and noncalcified plaque.  Patient was given a total of 4 mg of Ativan and 2000 mg of Keppra.  She was also given 2 L of normal saline.  I asked the emergency department to consult teleneurology before admit the  patient.    Cannot review systems    Personal History     Past Medical History:  Coronary artery disease status post CABG  Carotid artery stenosis  Anxiety/depression  Obstructive sleep apnea  Type 2 diabetes  CVA during admission post CABG  Seizure disorder  Ischemic cardiomyopathy with EF of 55% and 2023    Past Surgical History:  Cardiac catheterization    Coronary artery bypass  Carotid artery stent      Family History:   Coronary artery disease    Social History:   Former smoker.  No alcohol.    Home Medications:  Insulin Glargine, aspirin, atorvastatin, bumetanide, clopidogrel, ergocalciferol, insulin detemir, metFORMIN, metoprolol succinate XL, polyethylene glycol, and venlafaxine    Allergies:  Allergies   Allergen Reactions    Theophylline Hives         Objective   Objective     Vitals:   Temp:  [97.4 °F (36.3 °C)] 97.4 °F (36.3 °C)  Heart Rate:  [104-117] 109  Resp:  [16] 16  BP: (156-230)/() 190/92    Physical Exam    Constitutional: Sleepy.  No respiratory distress.   Eyes: Pupils equal, sclerae anicteric, no conjunctival injection   HENT: NCAT, mucous membranes moist   Neck: Supple, no thyromegaly, no lymphadenopathy, trachea midline   Respiratory: Clear to auscultation bilaterally, nonlabored respirations    Cardiovascular: RRR, no murmurs, rubs, or gallops, palpable pedal pulses bilaterally   Gastrointestinal: Positive bowel sounds, soft, nontender, nondistended   Musculoskeletal: No bilateral ankle edema, no clubbing or cyanosis to extremities   Psychiatric: Appropriate affect, cooperative   Neurologic: Oriented x 0, strength symmetric in all extremities, Cranial Nerves grossly intact to confrontation, speech clear   Skin: No rashes     Result Review    Result Review:  I have personally reviewed the results from the time of this admission to 2023 14:15 EST and agree with these findings:  Lactate was 12.6 which is trended down to 3.9      Assessment & Plan   Assessment / Plan      Assessment/Plan:   Acute metabolic encephalopathy  Fever concerning for possible meningitis  Seizure  Ischemic stroke  Coronary artery disease with recent CABG  Carotid artery stenosis with formant he and stent in the left carotid artery  Type 2 diabetes with hyperglycemia  Metabolic acidosis due to lactic acid post likely from seizure  Acute seizure    Lennie is a 68-year-old female with past medical history of coronary artery disease status post CABG in September, carotid artery stenosis with stent that now shows deformity, and history of seizure disorder in the past, and type 2 diabetes who presented after being found down.  Initially there was concern for seizure disorder which was witnessed by EMS.  Patient was given 4 mg of Ativan and 2 g of Keppra.  Then the patient appeared postictal.  At that time I went to the ER and examined the patient was concerned that she continued to have nonconvulsive status. Cerebel showed no seizure burden.  I asked the emergency department to consult teleneurology due to my concern for nonconvulsive status.  Teleneurology saw the patient was concern for seizure and CVA.  They did not think she was in status.  Later on in the afternoon the patient spiked a fever to 101.2.  At this time, I am concerned for possible meningitis.  Start broad-spectrum antibiotics.  As long as patient is stable require an LP tomorrow.  MRI is pending.      Plan:  --Admit to hospitalist service  -- Consult teleneurology  -- Ischemic stroke order set and check glucoses every 4 hours  --Sliding scale insulin every 4 hours due to severe hyperglycemia  -- CTA of the head neck was reviewed and shows deformity of left carotid artery stent and 65 stenosis of the right.  -- MRI of the brain is pending  -- Echocardiogram/intraoperative SCOTT in September shows EF of 55% and no thrombus with global normal function  -- Patient was given 4 mg of IV Ativan  -- 2 g of Keppra  -- Continue 1 g of Keppra every 12  hours per neurology  --EEG tomorrow  -- Patient spiked a fever after being admitted with seizure and acute metabolic encephalopathy concern for possible meningitis  -- Start vancomycin/ceftriaxone/ampicillin  -- Send COVID-19/flu/RSV  -- Strep and Legionella urine antigen  -- Procalcitonin now and in the morning  -- Repeat lactic acid        DVT prophylaxis:  SCDs    CODE STATUS:     Full code    Admission Status:  I believe this patient meets admission status.    Electronically signed by Tree Engel MD, 11/14/23, 2:15 PM EST.

## 2023-11-14 NOTE — PROGRESS NOTES
"[facility] Clinical Pharmacy Services: Vancomycin Pharmacokinetic Initial Consult Note    Lennie Gomez is a 68 y.o. female who is on day 1 of pharmacy to dose vancomycin for CNS Infection.    Consult Information  Consulting Provider: Tree Engel  Planned Duration of Therapy: 7 Days  Was Patient Receiving Prior to Admission/Consult?: No  Loading Dose Ordered or Given: 1250 mg on  at 1800  PK/PD Target: -600 mg/L.hr   Other Antimicrobials: Ampicillin and Ceftriaxone    Imaging Reviewed?: Yes    Microbiology Data  MRSA PCR performed: No; Result: Not ordered due to excluded indication or presence of suspected abscess  Culture/Source:       Vitals/Labs  Ht: 157.5 cm (62.01\"); Wt: 68.7 kg (151 lb 7.3 oz)  Temp (24hrs), Av.2 °F (37.9 °C), Min:97.4 °F (36.3 °C), Max:102.1 °F (38.9 °C)   Estimated Creatinine Clearance: 52.7 mL/min (by C-G formula based on SCr of 0.94 mg/dL).       Results from last 7 days   Lab Units 23  1033   CREATININE mg/dL 0.94   WBC 10*3/mm3 10.20     Assessment/Plan:    Vancomycin Dose:  750 mg IV every 12 hours; which provides the following predicted parameters:  Exposure target: AUC24 (range)400-600 mg/L.hr   AUC24,ss: 520 mg/L.hr  PAUC*: 79 %  Ctrough,ss: 17.4 mg/L  Pconc*: 35 %  Tox.: 13 %    Vanc Random ordered for 11/15 at 0600  Patient has order for Complete Metabolic Panel    Pharmacy will follow patient's kidney function and will adjust doses and obtain levels as necessary. Thank you for involving pharmacy in this patient's care. Please contact pharmacy with any questions or concerns.                           Denton Friedman [facility]  Clinical Pharmacist    "

## 2023-11-14 NOTE — PLAN OF CARE
Goal Outcome Evaluation:                      Pt new admission to PCU from ED. PT lethargic, but able to arouse; BP elevated at baseline from ED, continues to be elevated on floor. Care continuing.

## 2023-11-14 NOTE — ED PROVIDER NOTES
Time: 10:35 AM EST  Date of encounter:  2023  Independent Historian/Clinical History and Information was obtained by:   Nursing Staff and EMS    History is limited by: Altered Mental Status    Chief Complaint: Found down      History of Present Illness:  Patient is a 68 y.o. year old female who presents to the emergency department for evaluation of being found down.  Per EMS the patient was found down in her bed covered in vomit.  EMS was called at that point by the grandson.  Initially she was not following commands and just asked the question when they try to talk to her but then she had a seizure.  Reports that it lasted about 30 seconds and was tonic-clonic in nature.  She has been unresponsive since that time.  They did report her sugar was elevated.  Patient does have a history of coronary artery disease, diabetes, hypertension, hyperlipidemia.  Recent cardiac procedure and carotid procedure.    HPI    Patient Care Team  Primary Care Provider: Jacqui Patterson APRN    Past Medical History:     Allergies   Allergen Reactions    Theophylline Hives     Past Medical History:   Diagnosis Date    Arthritis     Coronary artery disease     Diabetes mellitus     Elevated cholesterol     Elevated cholesterol 2023    Hypertension     Myocardial infarction      Past Surgical History:   Procedure Laterality Date    CARDIAC CATHETERIZATION      CARDIAC CATHETERIZATION N/A 2023    Procedure: Left Heart Cath with possible coronary angioplasty;  Surgeon: Surendra Sprague MD;  Location: [facility] CATH INVASIVE LOCATION;  Service: Cardiology;  Laterality: N/A;     SECTION      CORONARY ARTERY BYPASS GRAFT N/A 2023    Procedure: SCOTT STERNOTOMY CORONARY ARTERY BYPASS GRAFT TIMES 4 USING LEFT INTERNAL MAMMARY ARTERY AND RIGHT GREATER SAPHENOUS VEIN GRAFT PER ENDOSCOPIC VEIN HARVESTING AND PRP;  Surgeon: Damir Aaron MD;  Location: [facility] CVOR;  Service: Cardiothoracic;   Laterality: N/A;    CORONARY ARTERY BYPASS GRAFT  9/1/2023     Family History   Problem Relation Age of Onset    Heart attack Mother        Home Medications:  Prior to Admission medications    Medication Sig Start Date End Date Taking? Authorizing Provider   aspirin 81 MG EC tablet Take 1 tablet by mouth Daily for 90 days. 9/9/23 12/8/23  Jane Polk APRN   atorvastatin (LIPITOR) 40 MG tablet Take 1 tablet by mouth Every Night for 90 days. 9/8/23 12/7/23  Jane Polk APRN   bumetanide (BUMEX) 2 MG tablet Take 1 tablet by mouth Daily for 30 days. 10/4/23 11/3/23  Jane Polk APRN   clopidogrel (PLAVIX) 75 MG tablet Take 1 tablet by mouth Daily. 4/7/23   Adrian Lim MD   ergocalciferol (ERGOCALCIFEROL) 1.25 MG (75016 UT) capsule Take 1 capsule by mouth 1 (One) Time Per Week.    Adrian Lim MD   insulin detemir (Levemir FlexTouch) 100 UNIT/ML injection Inject 20 Units under the skin into the appropriate area as directed Daily.    Adrian Lim MD   Lantus SoloStar 100 UNIT/ML injection pen Lantus Solostar U-100 Insulin 100 unit/mL (3 mL) subcutaneous pen    Adrian Lim MD   metFORMIN (GLUCOPHAGE) 1000 MG tablet Take 1 tablet by mouth 2 (Two) Times a Day With Meals.    Adrian Lim MD   metoprolol succinate XL (TOPROL-XL) 25 MG 24 hr tablet Take 0.5 tablets by mouth Every 12 (Twelve) Hours for 90 days. 10/4/23 1/2/24  Jane Polk APRN   polyethylene glycol (MIRALAX) 17 g packet Take 17 g by mouth Daily As Needed (constipation). 9/8/23   Jane Polk APRN   venlafaxine (EFFEXOR) 75 MG tablet Take 1 tablet by mouth Daily.    Adrian Lim MD        Social History:   Social History     Tobacco Use    Smoking status: Former     Packs/day: 0.50     Years: 50.00     Additional pack years: 0.00     Total pack years: 25.00     Types: Cigarettes    Smokeless tobacco: Never   Vaping Use    Vaping Use: Never used  "  Substance Use Topics    Alcohol use: Never    Drug use: Never         Review of Systems:  Review of Systems   Unable to perform ROS: Mental status change        Physical Exam:  BP (!) 193/78   Pulse 109   Temp 97.4 °F (36.3 °C) (Rectal)   Resp 16   Ht 157.5 cm (62.01\")   Wt 68.3 kg (150 lb 9.2 oz)   SpO2 100%   BMI 27.53 kg/m²     Physical Exam  Vitals and nursing note reviewed.   HENT:      Head: Normocephalic and atraumatic.      Mouth/Throat:      Comments: Has some dried vomitus on the mouth  Eyes:      General: No scleral icterus.  Cardiovascular:      Rate and Rhythm: Normal rate and regular rhythm.   Pulmonary:      Effort: Pulmonary effort is normal.      Breath sounds: Normal breath sounds.   Abdominal:      Palpations: Abdomen is soft.      Tenderness: There is no abdominal tenderness.   Musculoskeletal:         General: Normal range of motion.      Cervical back: Normal range of motion.   Skin:     Findings: No rash.   Neurological:      Comments: Patient is withdrawing to pain.  Is not following commands.  I do not see a focal deficits noted at this time.  Appears to be partially postictal.                  Procedures:  Procedures      Medical Decision Making:      Comorbidities that affect care:    Coronary Artery Disease, Diabetes, Hypertension    External Notes reviewed:    Reviewed note from 9/29/2023, reviewed cardiothoracic note from 10/4/2023      The following orders were placed and all results were independently analyzed by me:  Orders Placed This Encounter   Procedures    Blood Culture - Blood,    Blood Culture - Blood,    XR Chest 1 View    CT Head Without Contrast    CT Angiogram Head    CT Angiogram Neck    CT CEREBRAL PERFUSION WITH & WITHOUT CONTRAST    MRI Brain With & Without Contrast    [place] Draw    Comprehensive Metabolic Panel    Single High Sensitivity Troponin T    Magnesium    Urinalysis With Microscopic If Indicated (No Culture) - Urine, Clean Catch    CBC Auto " Differential    Lactic Acid, Plasma    aPTT    Protime-INR    Acetone    STAT Lactic Acid, Reflex    STAT Lactic Acid, Reflex    NPO Diet NPO Type: Strict NPO    Undress & Gown    Continuous Pulse Oximetry    Vital Signs    Orthostatic Blood Pressure    Inpatient Hospitalist Consult    Inpatient Neurology Consult General    Oxygen Therapy- Nasal Cannula; Titrate 1-6 LPM Per SpO2; 90 - 95%    SLP Consult: Eval & Treat RN Dysphagia Screen Failed    POC Glucose Once    POC Glucose Once    ECG 12 Lead ED Triage Standing Order; Weak / Dizzy / AMS    Insert Peripheral IV    Fall Precautions    CBC & Differential    Green Top (Gel)    Lavender Top    Gold Top - SST    Light Blue Top    Ketone Bodies, Serum (Not performed at [facility])       Medications Given in the Emergency Department:  Medications   sodium chloride 0.9 % flush 10 mL (has no administration in time range)   sodium chloride 0.9 % bolus 1,000 mL (0 mL Intravenous Stopped 11/14/23 1250)   levETIRAcetam in NaCl 0.75% (KEPPRA) IVPB 1,000 mg (0 mg Intravenous Stopped 11/14/23 1130)   ondansetron (ZOFRAN) injection 4 mg (4 mg Intravenous Given 11/14/23 1126)   iopamidol (ISOVUE-370) 76 % injection 100 mL (150 mL Intravenous Given 11/14/23 1123)   sodium chloride 0.9 % bolus 1,000 mL (1,000 mL Intravenous New Bag 11/14/23 1135)   LORazepam (ATIVAN) injection 2 mg (2 mg Intravenous Given 11/14/23 1132)   LORazepam (ATIVAN) injection 2 mg (2 mg Intravenous Given 11/14/23 1159)   levETIRAcetam in NaCl 0.75% (KEPPRA) IVPB 1,000 mg (0 mg Intravenous Stopped 11/14/23 1249)        ED Course:    ED Course as of 11/14/23 1526   Tue Nov 14, 2023   1032 EKG interpreted by me  Time: 1030  Rate 111  Sinus tach, right bundle branch block, ST depressions likely due to rate [MA]   1131 Called to patient room.  Patient actively seizing.  Given 2 mg of Ativan.  Family reports that last known well was around 9 PM last night.  Has a history of epilepsy as a kid but has not had a  history of seizures for very long time.  Not currently on any meds. [MA]   1146 Patient with elevated lactic acid.  Likely due to seizures.  No source of infection noted at this time.  Awaiting more work-up.  Giving fluids. [MA]   1200 Called back to the room.  Patient appears to be having another seizures.  2 more milligrams of Ativan ordered.  Discussed with family possibility of escalation in care including intubation.  Will reassess after Ativan.  [MA]   1209 Per nursing patient had over 1300 mL out of her bladder.  Joiner catheter in place. [MA]   1415 Spoke with Dr. Engel who agrees to admit [MA]   1508 Spoke with teleneurology who is concern for possible stroke.  They are requesting stat MRI with and without contrast.  They report that she is not moving one side as well as the other. [MA]      ED Course User Index  [MA] Emmett Muñoz MD       Labs:    Lab Results (last 24 hours)       Procedure Component Value Units Date/Time    POC Glucose Once [[id number]]  (Abnormal) Collected: 11/14/23 1026    Specimen: Blood Updated: 11/14/23 1123     Glucose 342 mg/dL      Comment: Serial Number: [id number]:  [id number]       CBC & Differential [[id number]]  (Abnormal) Collected: 11/14/23 1033    Specimen: Blood from Arm, Right Updated: 11/14/23 1042    Narrative:      The following orders were created for panel order CBC & Differential.  Procedure                               Abnormality         Status                     ---------                               -----------         ------                     CBC Auto Differential[[id number]]        Abnormal            Final result                 Please view results for these tests on the individual orders.    Comprehensive Metabolic Panel [[id number]]  (Abnormal) Collected: 11/14/23 1033    Specimen: Blood from Arm, Right Updated: 11/14/23 1115     Glucose 393 mg/dL      BUN 12 mg/dL      Creatinine 0.94 mg/dL      Sodium 138 mmol/L      Potassium 4.3 mmol/L       Comment: Specimen hemolyzed.  Result may be falsely elevated.        Chloride 96 mmol/L      CO2 18.2 mmol/L      Calcium 9.2 mg/dL      Total Protein 8.2 g/dL      Albumin 4.8 g/dL      ALT (SGPT) 34 U/L      Comment: Specimen hemolyzed.  Result may  be falsely elevated.        AST (SGOT) 37 U/L      Comment: Specimen hemolyzed.  Result may be falsely elevated.        Alkaline Phosphatase 114 U/L      Total Bilirubin 0.3 mg/dL      Globulin 3.4 gm/dL      A/G Ratio 1.4 g/dL      BUN/Creatinine Ratio 12.8     Anion Gap 23.8 mmol/L      eGFR 66.6 mL/min/1.73     Narrative:      GFR Normal >60  Chronic Kidney Disease <60  Kidney Failure <15      Single High Sensitivity Troponin T [[id number]]  (Abnormal) Collected: 11/14/23 1033    Specimen: Blood from Arm, Right Updated: 11/14/23 1115     HS Troponin T 18 ng/L     Narrative:      High Sensitive Troponin T Reference Range:  <14.0 ng/L- Negative Female for AMI  <22.0 ng/L- Negative Male for AMI  >=14 - Abnormal Female indicating possible myocardial injury.  >=22 - Abnormal Male indicating possible myocardial injury.   Clinicians would have to utilize clinical acumen, EKG, Troponin, and serial changes to determine if it is an Acute Myocardial Infarction or myocardial injury due to an underlying chronic condition.         Magnesium [[id number]]  (Normal) Collected: 11/14/23 1033    Specimen: Blood from Arm, Right Updated: 11/14/23 1115     Magnesium 1.9 mg/dL     CBC Auto Differential [[id number]]  (Abnormal) Collected: 11/14/23 1033    Specimen: Blood from Arm, Right Updated: 11/14/23 1042     WBC 10.20 10*3/mm3      RBC 4.03 10*6/mm3      Hemoglobin 12.1 g/dL      Hematocrit 40.3 %      .0 fL      MCH 30.0 pg      MCHC 30.0 g/dL      RDW 14.6 %      RDW-SD 54.7 fl      MPV 10.3 fL      Platelets 316 10*3/mm3      Neutrophil % 73.3 %      Lymphocyte % 21.6 %      Monocyte % 3.7 %      Eosinophil % 0.2 %      Basophil % 0.6 %      Immature Grans % 0.6 %       Neutrophils, Absolute 7.48 10*3/mm3      Lymphocytes, Absolute 2.20 10*3/mm3      Monocytes, Absolute 0.38 10*3/mm3      Eosinophils, Absolute 0.02 10*3/mm3      Basophils, Absolute 0.06 10*3/mm3      Immature Grans, Absolute 0.06 10*3/mm3      nRBC 0.0 /100 WBC     Blood Culture - Blood, Arm, Right [[id number]] Collected: 11/14/23 1033    Specimen: Blood from Arm, Right Updated: 11/14/23 1039    Lactic Acid, Plasma [[id number]]  (Abnormal) Collected: 11/14/23 1033    Specimen: Blood from Arm, Right Updated: 11/14/23 1125     Lactate 12.6 mmol/L      Comment: Verified by repeat analysis.       aPTT [[id number]]  (Abnormal) Collected: 11/14/23 1033    Specimen: Blood from Arm, Right Updated: 11/14/23 1052     PTT 21.9 seconds     Protime-INR [[id number]]  (Normal) Collected: 11/14/23 1033    Specimen: Blood from Arm, Right Updated: 11/14/23 1052     Protime 14.6 Seconds      INR 1.11    Narrative:      Suggested Therapeutic Ranges For Oral Anticoagulant Therapy:  Level of Therapy                      INR Target Range  Standard Dose                            2.0-3.0  High Dose                                2.5-3.5  Patients not receiving anticoagulant  Therapy Normal Range                     0.86-1.15    Blood Culture - Blood, Arm, Left [[id number]] Collected: 11/14/23 1036    Specimen: Blood from Arm, Left Updated: 11/14/23 1046    Ketone Bodies, Serum (Not performed at [facility]) [[id number]]  (Normal) Collected: 11/14/23 1148    Specimen: Blood Updated: 11/14/23 1203    Narrative:      The following orders were created for panel order Ketone Bodies, Serum (Not performed at [facility]).  Procedure                               Abnormality         Status                     ---------                               -----------         ------                     Acetone[[id number]]                      Normal              Final result                 Please view results for these tests on the individual orders.    Acetone  [[id number]]  (Normal) Collected: 11/14/23 1148    Specimen: Blood Updated: 11/14/23 1203     Acetone Negative    Urinalysis With Microscopic If Indicated (No Culture) - Urine, Clean Catch [[id number]]  (Abnormal) Collected: 11/14/23 1152    Specimen: Urine, Clean Catch Updated: 11/14/23 1217     Color, UA Yellow     Appearance, UA Clear     pH, UA 6.5     Specific Gravity, UA >1.030     Glucose, UA >=1000 mg/dL (3+)     Ketones, UA Trace     Bilirubin, UA Negative     Blood, UA Negative     Protein, UA Negative     Leuk Esterase, UA Negative     Nitrite, UA Negative     Urobilinogen, UA 0.2 E.U./dL    Narrative:      Urine microscopic not indicated.    STAT Lactic Acid, Reflex [[id number]]  (Abnormal) Collected: 11/14/23 1351    Specimen: Blood from Arm, Right Updated: 11/14/23 1421     Lactate 3.9 mmol/L              Imaging:    CT Angiogram Neck    Result Date: 11/14/2023  PROCEDURE: CT ANGIOGRAM NECK  COMPARISON: [facility], CT, CT ANGIOGRAM NECK, 6/19/2023, 15:53.  [facility], CT, CT ANGIOGRAM HEAD, 6/19/2023, 15:53.  INDICATIONS: Neuro deficit, acute, stroke suspected  PROTOCOL:   Standard imaging protocol performed    RADIATION:      Automated exposure control was utilized to minimize radiation dose.  TECHNIQUE: After obtaining the patient's consent, CT images of the neck were obtained without and with non-ionic intravenous contrast material. Multi-planar reformatted/3-D images were created to optimize visualization of vascular anatomy. Unless otherwise stated in this report, all vascular stenoses involving the internal carotid arteries reported for this examination are derived by dividing the lesion diameter by the diameter of the normal internal carotid artery more distally.  FINDINGS:  There is mild calcified plaque of the aortic arch and origins of the great vessels.  Origins the great vessels remain widely patent.  Right brachiocephalic and subclavian arteries are  patent without focal stenosis.  Right common carotid artery is patent without focal stenosis.  There is mild calcified noncalcified plaque of the right carotid bifurcation resulting in less than 65% stenosis.  The left common carotid artery arises from a common trunk with the right brachiocephalic artery.  Left common carotid artery is patent without focal stenosis.  There is a large amount of calcified plaque of the left carotid bifurcation.  There is a left carotid artery stent in place which appears patent.  There is approximately 61% stenosis of the proximal left internal carotid artery with deformity of the stent at this location.  Left external carotid artery appears patent.  Left subclavian artery is patent without focal stenosis.  Vertebral arteries are codominant and patent to the level of the basilar.  No definite vertebral artery stenosis identified.  The unenhanced soft tissues of the neck are within normal limits.  No cervical lymphadenopathy.  No lytic or sclerotic bony lesions are identified.        1. Interval placement of left internal carotid artery stent.  There is approximately 61% stenosis of the left internal carotid artery due to deformity of the stent.  2.  65% stenosis of the right internal carotid artery due to a combination of calcified noncalcified plaque.  3. Patent vertebral arteries without evidence of focal stenosis.      RENNY LOCKETT MD       Electronically Signed and Approved By: RENNY LOCKETT MD on 11/14/2023 at 13:11             CT Angiogram Head    Result Date: 11/14/2023  PROCEDURE: CT ANGIOGRAM HEAD  COMPARISON: [facility], CT, CT ANGIOGRAM HEAD, 6/19/2023, 15:53.  [facility], CT, CTA NECK W/WO CONT, POST PROC, 3/19/2019, 13:52.  [facility], CT, CT HEAD WO CONTRAST, 11/14/2023, 10:47.  INDICATIONS: ams, seizure  PROTOCOL:   Standard imaging protocol performed    RADIATION:      MA and/or KV was adjusted to minimize  radiation dose.     TECHNIQUE: After obtaining the patient's consent, CT images of the head were obtained without and with non-ionic contrast, and multi-planar/3-D imaging were created and interpreted to optimize visualization of vascular anatomy.   FINDINGS:  The intracranial internal carotid arteries are patent without focal stenosis.  Bilateral anterior cerebral and middle cerebral arteries appear patent without focal stenosis.  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  Central nidus of the AVM measures approximately 2.2 x 1.4 cm.  There are multiple draining veins which appear to drain 2 peripheral veins along the dura as well as larger veins directly to superior sagittal sinus.  The right anterior cerebral artery is asymmetrically enlarged as compared to the left and is a prominent arterial feeding vessel.  There is no associated hemorrhage.  There are some vascular calcifications within the AVM.  No surrounding vasogenic edema.  No evidence of acute hemorrhage.  No definite anterior circulation aneurysm identified.  Vertebral arteries are patent skull base.  Basilar artery is patent without focal stenosis.  Bilateral superior cerebellar and posterior cerebral arteries are patent without focal stenosis.  No pathologic intracranial contrast enhancement.        1. No significant change in right frontal lobe arterial venous malformation.  No evidence of hemorrhage. 2. No intracranial vascular stenosis, occlusion, or aneurysm. 3. No pathologic contrast enhancement     RENNY LOCKETT MD       Electronically Signed and Approved By: RENNY LOCKETT MD on 11/14/2023 at 12:55             XR Chest 1 View    Result Date: 11/14/2023  PROCEDURE: XR CHEST 1 VW  COMPARISON: [facility], CT, CT CHEST LOW DOSE CANCER SCREENING WO, 2/15/2023, 9:56.  INDICATIONS: Altered Mental Status  FINDINGS:  Prior median sternotomy and CABG.  Discoid atelectasis versus scar in the  lingula.  Negative for infectious consolidation, edema, large effusion or pneumothorax.  Cardiomediastinal silhouette within normal limits.  Osseous structures grossly unremarkable.        No active pulmonary process.       INDIA MUIR MD       Electronically Signed and Approved By: INDIA MUIR MD on 11/14/2023 at 12:20             CT CEREBRAL PERFUSION WITH & WITHOUT CONTRAST    Result Date: 11/14/2023  PROCEDURE: CT CEREBRAL PERFUSION W WO CONTRAST  COMPARISON: [facility], CT, CT HEAD WO CONTRAST, 11/14/2023, 10:47.  INDICATIONS: ams, seizure, abnormal ct  PROTOCOL:   Standard imaging protocol performed    RADIATION:      Automated exposure control was utilized to minimize radiation dose.   FINDINGS: Intracranial perfusion appears normal.       Normal examination.       NHAN DAUGHERTY MD       Electronically Signed and Approved By: NHAN DAUGHERTY MD on 11/14/2023 at 11:39             CT Head Without Contrast    Result Date: 11/14/2023  PROCEDURE: CT HEAD WO CONTRAST  COMPARISON:  [facility], CT, CT ANGIOGRAM HEAD, 6/19/2023, 15:53. INDICATIONS: Mental status change, unknown cause, elevated glucose  PROTOCOL:   Standard imaging protocol performed    RADIATION:   DLP: 1018.2mGy*cm   MA and/or KV was adjusted to minimize radiation dose.     TECHNIQUE: After obtaining the patient's consent, CT images were obtained without non-ionic intravenous contrast material.  FINDINGS:  There is mild generalized parenchymal volume loss.  There are areas of encephalomalacia involving the left frontal and parietal lobes.  There are multiple calcifications within the right frontal lobe related to known AVM.  No surrounding vasogenic edema.  No evidence of acute hemorrhage.  There is no acute infarct or hemorrhage.  No abnormal extra-axial fluid collections are identified.  There is no mass effect or hydrocephalus.  Nasal airway is noted to be in place.  Visualized paranasal sinuses and  mastoid air cells are clear.  Globes and orbits are within normal limits.        1. No acute intracranial abnormality 2. Generalized parenchymal volume loss with areas of encephalomalacia involving the left frontal and parietal lobes 3. Multiple calcifications within the right frontal lobe related to known AVM.      RENNY LOCKETT MD       Electronically Signed and Approved By: RENNY LOCKETT MD on 11/14/2023 at 11:12                Differential Diagnosis and Discussion:    Altered Mental Status: Based on the patient's signs and symptoms, differential diagnosis includes but is not limited to meningitis, stroke, sepsis, subarachnoid hemorrhage, intracranial bleeding, encephalitis, and metabolic encephalopathy.    All labs were reviewed and interpreted by me.  All X-rays impressions were independently interpreted by me.  EKG was interpreted by me.  CT scan radiology impression was interpreted by me.    MDM     Patient is a 68-year-old female who presents with seizure as well as mental status change.  Has had several seizures here as well as 1 in route.  No acute findings of source of seizure at this time.  Does have a history of an AVM in the past as well as history of seizures in the past.  Was given Keppra as well as Ativan here.  Appears to be more postictal at this time and not actively seizing.  Lactate was significantly elevated but likely due to seizures.  Fluids have been given.  Holding off on antibiotics as there is no source of infection.  Will admit to the hospital for further work-up management.    Critical Care Note: Total Critical Care time of 35 minutes. Total critical care time documented does not include time spent on separately billed procedures for services of nurses or physician assistants. I personally saw and examined the patient. I have reviewed all diagnostic interpretations and treatment plans as written. I was present for the key portions of any procedures performed and the inclusive time  noted in any critical care statement. Critical care time includes patient management by me, time spent at the patients bedside,  time to review lab and imaging results, discussing patient care, documentation in the medical record, and time spent with family or caregiver.    Patient Care Considerations:    SEPSIS IS NOT PRESENT IN THE EMERGENCY DEPARTMENT: Patient meets SIRS criteria in the emergency department however the patient does not have a known source of bacterial infection to confirm the diagnosis of sepsis.        Consultants/Shared Management Plan:    Hospitalist: I have discussed the case with Dr. Engel who agrees to accept the patient for admission.    Social Determinants of Health:          Disposition and Care Coordination:    Admit:   Through independent evaluation of the patient's history, physical, and imperical data, the patient meets criteria for observation/admission to the hospital.        Final diagnoses:   Seizures        ED Disposition       ED Disposition   Decision to Admit    Condition   --    Comment   --               This medical record created using voice recognition software.             Emmett Muñoz MD  11/14/23 1417       Emmett Muñoz MD  11/14/23 1525

## 2023-11-15 ENCOUNTER — APPOINTMENT (OUTPATIENT)
Dept: INTERVENTIONAL RADIOLOGY/VASCULAR | Facility: HOSPITAL | Age: 68
DRG: 056 | End: 2023-11-15
Payer: MEDICARE

## 2023-11-15 ENCOUNTER — APPOINTMENT (OUTPATIENT)
Dept: CARDIOLOGY | Facility: HOSPITAL | Age: 68
DRG: 056 | End: 2023-11-15
Payer: MEDICARE

## 2023-11-15 ENCOUNTER — APPOINTMENT (OUTPATIENT)
Dept: NEUROLOGY | Facility: HOSPITAL | Age: 68
DRG: 056 | End: 2023-11-15
Payer: MEDICARE

## 2023-11-15 LAB
ALBUMIN SERPL-MCNC: 3.8 G/DL (ref 3.5–5.2)
ALBUMIN/GLOB SERPL: 1.3 G/DL
ALP SERPL-CCNC: 69 U/L (ref 39–117)
ALT SERPL W P-5'-P-CCNC: 21 U/L (ref 1–33)
ANION GAP SERPL CALCULATED.3IONS-SCNC: 8.3 MMOL/L (ref 5–15)
APPEARANCE CSF: CLEAR
APPEARANCE CSF: CLEAR
APTT PPP: 28.3 SECONDS (ref 24.2–34.2)
AST SERPL-CCNC: 18 U/L (ref 1–32)
BASOPHILS # BLD AUTO: 0.04 10*3/MM3 (ref 0–0.2)
BASOPHILS NFR BLD AUTO: 0.4 % (ref 0–1.5)
BH CV ECHO MEAS - ACS: 1.8 CM
BH CV ECHO MEAS - AO MAX PG: 15 MMHG
BH CV ECHO MEAS - AO MEAN PG: 9 MMHG
BH CV ECHO MEAS - AO ROOT DIAM: 2.9 CM
BH CV ECHO MEAS - AO V2 MAX: 195 CM/SEC
BH CV ECHO MEAS - AO V2 VTI: 38.3 CM
BH CV ECHO MEAS - AVA(I,D): 2.23 CM2
BH CV ECHO MEAS - EDV(CUBED): 46.7 ML
BH CV ECHO MEAS - EDV(MOD-SP2): 61.1 ML
BH CV ECHO MEAS - EDV(MOD-SP4): 51.6 ML
BH CV ECHO MEAS - EF(MOD-BP): 57.5 %
BH CV ECHO MEAS - EF(MOD-SP2): 59.9 %
BH CV ECHO MEAS - EF(MOD-SP4): 54.5 %
BH CV ECHO MEAS - ESV(CUBED): 15.6 ML
BH CV ECHO MEAS - ESV(MOD-SP2): 24.5 ML
BH CV ECHO MEAS - ESV(MOD-SP4): 23.5 ML
BH CV ECHO MEAS - FS: 30.6 %
BH CV ECHO MEAS - IVS/LVPW: 1 CM
BH CV ECHO MEAS - IVSD: 1.2 CM
BH CV ECHO MEAS - LA DIMENSION: 2.8 CM
BH CV ECHO MEAS - LAT PEAK E' VEL: 5.2 CM/SEC
BH CV ECHO MEAS - LV MASS(C)D: 141.5 GRAMS
BH CV ECHO MEAS - LV MAX PG: 12 MMHG
BH CV ECHO MEAS - LV MEAN PG: 6 MMHG
BH CV ECHO MEAS - LV V1 MAX: 173 CM/SEC
BH CV ECHO MEAS - LV V1 VTI: 27.2 CM
BH CV ECHO MEAS - LVIDD: 3.6 CM
BH CV ECHO MEAS - LVIDS: 2.5 CM
BH CV ECHO MEAS - LVOT AREA: 3.1 CM2
BH CV ECHO MEAS - LVOT DIAM: 2 CM
BH CV ECHO MEAS - LVPWD: 1.2 CM
BH CV ECHO MEAS - MED PEAK E' VEL: 5.9 CM/SEC
BH CV ECHO MEAS - MV A MAX VEL: 105 CM/SEC
BH CV ECHO MEAS - MV DEC TIME: 0.21 SEC
BH CV ECHO MEAS - MV E MAX VEL: 79.7 CM/SEC
BH CV ECHO MEAS - MV E/A: 0.76
BH CV ECHO MEAS - SV(LVOT): 85.5 ML
BH CV ECHO MEAS - SV(MOD-SP2): 36.6 ML
BH CV ECHO MEAS - SV(MOD-SP4): 28.1 ML
BH CV ECHO MEAS - TAPSE (>1.6): 1.42 CM
BH CV ECHO MEASUREMENTS AVERAGE E/E' RATIO: 14.36
BILIRUB SERPL-MCNC: 0.3 MG/DL (ref 0–1.2)
BUN SERPL-MCNC: 11 MG/DL (ref 8–23)
BUN/CREAT SERPL: 16.9 (ref 7–25)
C GATTII+NEOFOR DNA CSF QL NAA+NON-PROBE: NOT DETECTED
CALCIUM SPEC-SCNC: 8.3 MG/DL (ref 8.6–10.5)
CHLORIDE SERPL-SCNC: 105 MMOL/L (ref 98–107)
CHOLEST SERPL-MCNC: 90 MG/DL (ref 0–200)
CMV DNA CSF QL NAA+PROBE: NOT DETECTED
CO2 SERPL-SCNC: 28.7 MMOL/L (ref 22–29)
COLOR CSF: COLORLESS
COLOR CSF: COLORLESS
CREAT SERPL-MCNC: 0.65 MG/DL (ref 0.57–1)
CRYPTOC AG CSF QL LA: NEGATIVE
DEPRECATED RDW RBC AUTO: 52.2 FL (ref 37–54)
E COLI K1 DNA CSF QL NAA+NON-PROBE: NOT DETECTED
EGFRCR SERPLBLD CKD-EPI 2021: 96.6 ML/MIN/1.73
EOSINOPHIL # BLD AUTO: 0.01 10*3/MM3 (ref 0–0.4)
EOSINOPHIL NFR BLD AUTO: 0.1 % (ref 0.3–6.2)
ERYTHROCYTE [DISTWIDTH] IN BLOOD BY AUTOMATED COUNT: 14.8 % (ref 12.3–15.4)
EV RNA CSF QL NAA+PROBE: NOT DETECTED
GLOBULIN UR ELPH-MCNC: 3 GM/DL
GLUCOSE BLDC GLUCOMTR-MCNC: 126 MG/DL (ref 70–99)
GLUCOSE BLDC GLUCOMTR-MCNC: 154 MG/DL (ref 70–99)
GLUCOSE BLDC GLUCOMTR-MCNC: 177 MG/DL (ref 70–99)
GLUCOSE BLDC GLUCOMTR-MCNC: 184 MG/DL (ref 70–99)
GLUCOSE BLDC GLUCOMTR-MCNC: 203 MG/DL (ref 70–99)
GLUCOSE BLDC GLUCOMTR-MCNC: 241 MG/DL (ref 70–99)
GLUCOSE CSF-MCNC: 112 MG/DL (ref 40–70)
GLUCOSE SERPL-MCNC: 146 MG/DL (ref 65–99)
GP B STREP DNA SPEC QL NAA+PROBE: NOT DETECTED
HAEM INFLU SEROTYP DNA SPEC NAA+PROBE: NOT DETECTED
HBA1C MFR BLD: 7.4 % (ref 4.8–5.6)
HCT VFR BLD AUTO: 32.1 % (ref 34–46.6)
HDLC SERPL-MCNC: 40 MG/DL (ref 40–60)
HGB BLD-MCNC: 10 G/DL (ref 12–15.9)
HHV6 DNA CSF QL NAA+PROBE: NOT DETECTED
HSV1 DNA CSF QL NAA+PROBE: NOT DETECTED
HSV2 DNA CSF QL NAA+PROBE: NOT DETECTED
IMM GRANULOCYTES # BLD AUTO: 0.03 10*3/MM3 (ref 0–0.05)
IMM GRANULOCYTES NFR BLD AUTO: 0.3 % (ref 0–0.5)
INR PPP: 1.12 (ref 0.86–1.15)
L MONOCYTOG RRNA SPEC QL PROBE: NOT DETECTED
LDLC SERPL CALC-MCNC: 30 MG/DL (ref 0–100)
LDLC/HDLC SERPL: 0.73 {RATIO}
LEFT ATRIUM VOLUME INDEX: 20.2 ML/M2
LYMPHOCYTES # BLD AUTO: 3.26 10*3/MM3 (ref 0.7–3.1)
LYMPHOCYTES NFR BLD AUTO: 36.4 % (ref 19.6–45.3)
MAGNESIUM SERPL-MCNC: 1.9 MG/DL (ref 1.6–2.4)
MCH RBC QN AUTO: 29.9 PG (ref 26.6–33)
MCHC RBC AUTO-ENTMCNC: 31.2 G/DL (ref 31.5–35.7)
MCV RBC AUTO: 96.1 FL (ref 79–97)
MONOCYTES # BLD AUTO: 0.83 10*3/MM3 (ref 0.1–0.9)
MONOCYTES NFR BLD AUTO: 9.3 % (ref 5–12)
N MEN DNA SPEC QL NAA+PROBE: NOT DETECTED
NEUTROPHILS NFR BLD AUTO: 4.79 10*3/MM3 (ref 1.7–7)
NEUTROPHILS NFR BLD AUTO: 53.5 % (ref 42.7–76)
NRBC BLD AUTO-RTO: 0 /100 WBC (ref 0–0.2)
NUC CELL # CSF MANUAL: 1.11 /MM3 (ref 0–5)
NUC CELL # CSF MANUAL: 1.11 /MM3 (ref 0–5)
PARECHOVIRUS A RNA CSF QL NAA+NON-PROBE: NOT DETECTED
PLATELET # BLD AUTO: 210 10*3/MM3 (ref 140–450)
PMV BLD AUTO: 10.2 FL (ref 6–12)
POTASSIUM SERPL-SCNC: 3.1 MMOL/L (ref 3.5–5.2)
PROCALCITONIN SERPL-MCNC: 0.07 NG/ML (ref 0–0.25)
PROT CSF-MCNC: 155.4 MG/DL (ref 15–45)
PROT SERPL-MCNC: 6.8 G/DL (ref 6–8.5)
PROTHROMBIN TIME: 14.7 SECONDS (ref 11.8–14.9)
RBC # BLD AUTO: 3.34 10*6/MM3 (ref 3.77–5.28)
RBC # CSF MANUAL: 0 /MM3
RBC # CSF MANUAL: 1.11 /MM3
S PNEUM DNA CSF QL NAA+NON-PROBE: NOT DETECTED
SODIUM SERPL-SCNC: 142 MMOL/L (ref 136–145)
TRIGL SERPL-MCNC: 105 MG/DL (ref 0–150)
TUBE # CSF: 1
TUBE # CSF: 4
VANCOMYCIN SERPL-MCNC: 11.4 MCG/ML (ref 5–40)
VLDLC SERPL-MCNC: 20 MG/DL (ref 5–40)
VZV DNA CSF QL NAA+PROBE: NOT DETECTED
WBC NRBC COR # BLD: 8.96 10*3/MM3 (ref 3.4–10.8)
XANTHOCHROMIA FLD QL: NORMAL
XANTHOCHROMIA FLD QL: NORMAL

## 2023-11-15 PROCEDURE — 88108 CYTOPATH CONCENTRATE TECH: CPT | Performed by: INTERNAL MEDICINE

## 2023-11-15 PROCEDURE — 87205 SMEAR GRAM STAIN: CPT | Performed by: INTERNAL MEDICINE

## 2023-11-15 PROCEDURE — 87116 MYCOBACTERIA CULTURE: CPT | Performed by: INTERNAL MEDICINE

## 2023-11-15 PROCEDURE — 25010000002 ONDANSETRON PER 1 MG: Performed by: INTERNAL MEDICINE

## 2023-11-15 PROCEDURE — 94799 UNLISTED PULMONARY SVC/PX: CPT

## 2023-11-15 PROCEDURE — 25810000003 SODIUM CHLORIDE 0.9 % SOLUTION: Performed by: INTERNAL MEDICINE

## 2023-11-15 PROCEDURE — 99231 SBSQ HOSP IP/OBS SF/LOW 25: CPT | Performed by: PSYCHIATRY & NEUROLOGY

## 2023-11-15 PROCEDURE — 87015 SPECIMEN INFECT AGNT CONCNTJ: CPT | Performed by: INTERNAL MEDICINE

## 2023-11-15 PROCEDURE — 92610 EVALUATE SWALLOWING FUNCTION: CPT

## 2023-11-15 PROCEDURE — 82948 REAGENT STRIP/BLOOD GLUCOSE: CPT

## 2023-11-15 PROCEDURE — 94761 N-INVAS EAR/PLS OXIMETRY MLT: CPT

## 2023-11-15 PROCEDURE — 97165 OT EVAL LOW COMPLEX 30 MIN: CPT

## 2023-11-15 PROCEDURE — 93306 TTE W/DOPPLER COMPLETE: CPT

## 2023-11-15 PROCEDURE — 87327 CRYPTOCOCCUS NEOFORM AG IA: CPT | Performed by: INTERNAL MEDICINE

## 2023-11-15 PROCEDURE — 97161 PT EVAL LOW COMPLEX 20 MIN: CPT

## 2023-11-15 PROCEDURE — 80061 LIPID PANEL: CPT | Performed by: INTERNAL MEDICINE

## 2023-11-15 PROCEDURE — 99233 SBSQ HOSP IP/OBS HIGH 50: CPT | Performed by: INTERNAL MEDICINE

## 2023-11-15 PROCEDURE — 95819 EEG AWAKE AND ASLEEP: CPT

## 2023-11-15 PROCEDURE — 84157 ASSAY OF PROTEIN OTHER: CPT | Performed by: INTERNAL MEDICINE

## 2023-11-15 PROCEDURE — 82945 GLUCOSE OTHER FLUID: CPT | Performed by: INTERNAL MEDICINE

## 2023-11-15 PROCEDURE — 25010000002 VANCOMYCIN 5 G RECONSTITUTED SOLUTION: Performed by: INTERNAL MEDICINE

## 2023-11-15 PROCEDURE — 89050 BODY FLUID CELL COUNT: CPT | Performed by: INTERNAL MEDICINE

## 2023-11-15 PROCEDURE — 87075 CULTR BACTERIA EXCEPT BLOOD: CPT | Performed by: INTERNAL MEDICINE

## 2023-11-15 PROCEDURE — 86592 SYPHILIS TEST NON-TREP QUAL: CPT | Performed by: INTERNAL MEDICINE

## 2023-11-15 PROCEDURE — 93306 TTE W/DOPPLER COMPLETE: CPT | Performed by: SPECIALIST

## 2023-11-15 PROCEDURE — 85730 THROMBOPLASTIN TIME PARTIAL: CPT | Performed by: INTERNAL MEDICINE

## 2023-11-15 PROCEDURE — 25010000002 CEFTRIAXONE PER 250 MG: Performed by: INTERNAL MEDICINE

## 2023-11-15 PROCEDURE — 85610 PROTHROMBIN TIME: CPT | Performed by: INTERNAL MEDICINE

## 2023-11-15 PROCEDURE — 63710000001 INSULIN LISPRO (HUMAN) PER 5 UNITS: Performed by: INTERNAL MEDICINE

## 2023-11-15 PROCEDURE — 25010000002 POTASSIUM CHLORIDE 10 MEQ/100ML SOLUTION: Performed by: INTERNAL MEDICINE

## 2023-11-15 PROCEDURE — 87483 CNS DNA AMP PROBE TYPE 12-25: CPT | Performed by: INTERNAL MEDICINE

## 2023-11-15 PROCEDURE — 25010000002 LEVETIRACETAM IN NACL 0.75% 1000 MG/100ML SOLUTION: Performed by: INTERNAL MEDICINE

## 2023-11-15 PROCEDURE — 25010000002 AMPICILLIN PER 500 MG: Performed by: INTERNAL MEDICINE

## 2023-11-15 PROCEDURE — 80202 ASSAY OF VANCOMYCIN: CPT | Performed by: INTERNAL MEDICINE

## 2023-11-15 PROCEDURE — 83735 ASSAY OF MAGNESIUM: CPT | Performed by: INTERNAL MEDICINE

## 2023-11-15 PROCEDURE — 87070 CULTURE OTHR SPECIMN AEROBIC: CPT | Performed by: INTERNAL MEDICINE

## 2023-11-15 PROCEDURE — 83036 HEMOGLOBIN GLYCOSYLATED A1C: CPT | Performed by: INTERNAL MEDICINE

## 2023-11-15 PROCEDURE — 84145 PROCALCITONIN (PCT): CPT | Performed by: INTERNAL MEDICINE

## 2023-11-15 PROCEDURE — 85025 COMPLETE CBC W/AUTO DIFF WBC: CPT | Performed by: INTERNAL MEDICINE

## 2023-11-15 PROCEDURE — 87206 SMEAR FLUORESCENT/ACID STAI: CPT | Performed by: INTERNAL MEDICINE

## 2023-11-15 PROCEDURE — 80053 COMPREHEN METABOLIC PANEL: CPT | Performed by: INTERNAL MEDICINE

## 2023-11-15 RX ORDER — CLOPIDOGREL BISULFATE 75 MG/1
75 TABLET ORAL DAILY
Status: DISCONTINUED | OUTPATIENT
Start: 2023-11-15 | End: 2023-11-20 | Stop reason: HOSPADM

## 2023-11-15 RX ORDER — POTASSIUM CHLORIDE 7.45 MG/ML
10 INJECTION INTRAVENOUS
Status: COMPLETED | OUTPATIENT
Start: 2023-11-15 | End: 2023-11-15

## 2023-11-15 RX ORDER — LORAZEPAM 2 MG/ML
1 INJECTION INTRAMUSCULAR EVERY 4 HOURS PRN
Status: DISCONTINUED | OUTPATIENT
Start: 2023-11-15 | End: 2023-11-20 | Stop reason: HOSPADM

## 2023-11-15 RX ORDER — METOPROLOL SUCCINATE 50 MG/1
100 TABLET, EXTENDED RELEASE ORAL DAILY
Status: DISCONTINUED | OUTPATIENT
Start: 2023-11-15 | End: 2023-11-20 | Stop reason: HOSPADM

## 2023-11-15 RX ORDER — ONDANSETRON 2 MG/ML
4 INJECTION INTRAMUSCULAR; INTRAVENOUS EVERY 6 HOURS PRN
Status: DISCONTINUED | OUTPATIENT
Start: 2023-11-15 | End: 2023-11-20 | Stop reason: HOSPADM

## 2023-11-15 RX ORDER — LIDOCAINE HYDROCHLORIDE 20 MG/ML
20 INJECTION, SOLUTION INFILTRATION; PERINEURAL ONCE
Status: COMPLETED | OUTPATIENT
Start: 2023-11-15 | End: 2023-11-15

## 2023-11-15 RX ORDER — INSULIN LISPRO 100 [IU]/ML
2-7 INJECTION, SOLUTION INTRAVENOUS; SUBCUTANEOUS EVERY 4 HOURS
Status: DISCONTINUED | OUTPATIENT
Start: 2023-11-15 | End: 2023-11-16

## 2023-11-15 RX ADMIN — POTASSIUM CHLORIDE 10 MEQ: 7.46 INJECTION, SOLUTION INTRAVENOUS at 11:56

## 2023-11-15 RX ADMIN — CEFTRIAXONE SODIUM 2000 MG: 2 INJECTION, POWDER, FOR SOLUTION INTRAMUSCULAR; INTRAVENOUS at 16:47

## 2023-11-15 RX ADMIN — AMPICILLIN SODIUM 2 G: 2 INJECTION, POWDER, FOR SOLUTION INTRAVENOUS at 09:56

## 2023-11-15 RX ADMIN — ASPIRIN 81 MG CHEWABLE TABLET 81 MG: 81 TABLET CHEWABLE at 13:01

## 2023-11-15 RX ADMIN — LIDOCAINE HYDROCHLORIDE 9 ML: 20 INJECTION, SOLUTION INFILTRATION; PERINEURAL at 11:00

## 2023-11-15 RX ADMIN — CEFTRIAXONE SODIUM 2000 MG: 2 INJECTION, POWDER, FOR SOLUTION INTRAMUSCULAR; INTRAVENOUS at 05:58

## 2023-11-15 RX ADMIN — AMPICILLIN SODIUM 2 G: 2 INJECTION, POWDER, FOR SOLUTION INTRAVENOUS at 17:49

## 2023-11-15 RX ADMIN — INSULIN LISPRO 3 UNITS: 100 INJECTION, SOLUTION INTRAVENOUS; SUBCUTANEOUS at 21:19

## 2023-11-15 RX ADMIN — AMPICILLIN SODIUM 2 G: 2 INJECTION, POWDER, FOR SOLUTION INTRAVENOUS at 20:51

## 2023-11-15 RX ADMIN — VANCOMYCIN HYDROCHLORIDE 1000 MG: 5 INJECTION, POWDER, LYOPHILIZED, FOR SOLUTION INTRAVENOUS at 18:31

## 2023-11-15 RX ADMIN — ATORVASTATIN CALCIUM 80 MG: 40 TABLET, FILM COATED ORAL at 20:06

## 2023-11-15 RX ADMIN — Medication 10 ML: at 08:38

## 2023-11-15 RX ADMIN — LEVETIRACETAM 1000 MG: 10 INJECTION, SOLUTION INTRAVENOUS at 20:05

## 2023-11-15 RX ADMIN — AMPICILLIN SODIUM 2 G: 2 INJECTION, POWDER, FOR SOLUTION INTRAVENOUS at 02:00

## 2023-11-15 RX ADMIN — AMPICILLIN SODIUM 2 G: 2 INJECTION, POWDER, FOR SOLUTION INTRAVENOUS at 05:44

## 2023-11-15 RX ADMIN — INSULIN LISPRO 2 UNITS: 100 INJECTION, SOLUTION INTRAVENOUS; SUBCUTANEOUS at 05:58

## 2023-11-15 RX ADMIN — POTASSIUM CHLORIDE 10 MEQ: 7.46 INJECTION, SOLUTION INTRAVENOUS at 13:21

## 2023-11-15 RX ADMIN — INSULIN LISPRO 2 UNITS: 100 INJECTION, SOLUTION INTRAVENOUS; SUBCUTANEOUS at 13:00

## 2023-11-15 RX ADMIN — INSULIN LISPRO 2 UNITS: 100 INJECTION, SOLUTION INTRAVENOUS; SUBCUTANEOUS at 01:56

## 2023-11-15 RX ADMIN — VANCOMYCIN HYDROCHLORIDE 750 MG: 500 INJECTION, POWDER, LYOPHILIZED, FOR SOLUTION INTRAVENOUS at 07:55

## 2023-11-15 RX ADMIN — METOPROLOL SUCCINATE 100 MG: 50 TABLET, EXTENDED RELEASE ORAL at 13:25

## 2023-11-15 RX ADMIN — POTASSIUM CHLORIDE 10 MEQ: 7.46 INJECTION, SOLUTION INTRAVENOUS at 08:37

## 2023-11-15 RX ADMIN — SODIUM BICARBONATE 1 ML: 84 INJECTION, SOLUTION INTRAVENOUS at 11:00

## 2023-11-15 RX ADMIN — LEVETIRACETAM 1000 MG: 10 INJECTION, SOLUTION INTRAVENOUS at 08:34

## 2023-11-15 RX ADMIN — ONDANSETRON 4 MG: 2 INJECTION INTRAMUSCULAR; INTRAVENOUS at 19:38

## 2023-11-15 RX ADMIN — POTASSIUM CHLORIDE 10 MEQ: 7.46 INJECTION, SOLUTION INTRAVENOUS at 10:16

## 2023-11-15 RX ADMIN — AMPICILLIN SODIUM 2 G: 2 INJECTION, POWDER, FOR SOLUTION INTRAVENOUS at 13:25

## 2023-11-15 RX ADMIN — Medication 10 ML: at 20:42

## 2023-11-15 RX ADMIN — CLOPIDOGREL BISULFATE 75 MG: 75 TABLET ORAL at 13:25

## 2023-11-15 RX ADMIN — INSULIN LISPRO 3 UNITS: 100 INJECTION, SOLUTION INTRAVENOUS; SUBCUTANEOUS at 17:48

## 2023-11-15 NOTE — THERAPY EVALUATION
Acute Care - Speech Language Pathology   Swallow Initial Evaluation  [facility]     Patient Name: Lennie Gomez  : 1955  MRN: [id number]  Today's Date: 11/15/2023               Admit Date: 2023    Visit Dx:     ICD-10-CM ICD-9-CM   1. Seizures  R56.9 780.39   2. Oropharyngeal dysphagia  R13.12 787.22     Patient Active Problem List   Diagnosis    Preoperative clearance    Abnormal nuclear stress test    CAD, multiple vessel    Coronary atherosclerosis due to calcified coronary lesion (CODE)    CAD (coronary artery disease)    Type 2 diabetes mellitus with hyperglycemia    Hypertension, essential    Elevated cholesterol    Diabetic polyneuropathy associated with type 2 diabetes mellitus    CVA (cerebral vascular accident)     Past Medical History:   Diagnosis Date    Arthritis     Coronary artery disease     CVA (cerebral vascular accident) 2023    Diabetes mellitus     Elevated cholesterol     Elevated cholesterol 2023    Hypertension     Myocardial infarction      Past Surgical History:   Procedure Laterality Date    CARDIAC CATHETERIZATION      CARDIAC CATHETERIZATION N/A 2023    Procedure: Left Heart Cath with possible coronary angioplasty;  Surgeon: Surendra Sprague MD;  Location: [facility] CATH INVASIVE LOCATION;  Service: Cardiology;  Laterality: N/A;     SECTION      CORONARY ARTERY BYPASS GRAFT N/A 2023    Procedure: SCOTT STERNOTOMY CORONARY ARTERY BYPASS GRAFT TIMES 4 USING LEFT INTERNAL MAMMARY ARTERY AND RIGHT GREATER SAPHENOUS VEIN GRAFT PER ENDOSCOPIC VEIN HARVESTING AND PRP;  Surgeon: Damir Aaron MD;  Location: [facility];  Service: Cardiothoracic;  Laterality: N/A;    CORONARY ARTERY BYPASS GRAFT  2023       SLP Recommendation and Plan             Inpatient Speech Pathology Dysphagia Evaluation        PAIN SCALE: None indicated    PRECAUTIONS/CONTRAINDICATIONS: Standard, fall    SUSPECTED ABUSE/NEGLECT/EXPLOITATION: None  identified    SOCIAL/PSYCHOLOGICAL NEEDS/BARRIERS: None identified    PAST SOCIAL HISTORY: 68-year-old female, lives at home    PRIOR FUNCTION: On regular diet.  Patient reports no prior swallowing difficulty.    PATIENT GOALS/EXPECTATIONS: To begin eating and drinking    HISTORY: Patient is a 68-year-old female admitted to [facility] on 11/14/2023 secondary to suspected CVA.  Patient had been admitted to [facility] in September 2023 for CVA.  Previous speech pathology report at [facility] did not reveal any swallowing difficulties or speech-language deficits.  Speech pathology services were reconsulted at [facility] due to change in mental status.  MRI revealed old lacunar infarction in thalamic areas as well as right frontal lobe underlying vascular malformation.    CURRENT DIET LEVEL: N.p.o.    OBJECTIVE:    TEST ADMINISTERED: Clinical dysphagia evaluation    COGNITION/SAFETY AWARENESS: Not thoroughly evaluated.    BEHAVIORAL OBSERVATIONS: Patient is awake, cooperative.  Follows simple commands during evaluation    ORAL MOTOR EXAM: Edentulous.  Generalized decreased oral motor strength/range of motion 4+/5.    VOICE QUALITY: Clear    REFLEX EXAM: Adequate    POSTURE: Sitting fully upright    FEEDING/SWALLOWING FUNCTION: Assessed with thin liquids, nectar thick liquids, purée solids, regular solids    CLINICAL OBSERVATIONS: Nectar thick and thin liquids given by spoon, cup and controlled straw drink.  Swallows completed with mild delay.  Vocal quality clear and laryngeal sounds clear with cervical auscultation.  Purée solids.  Swallows completed.  Regular solids with extended chewing.  Diffuse oral residue however clears with minimal cues for lingual sweep.  Free drinking of thin liquids via straw with coughing observed.  Repeat trial of thin liquids via straw with controlled sip.  No overt signs or symptoms of aspiration observed.  Laryngeal elevation on  palpation.  Patient with mild swallow delay with all consistencies.  Signs symptoms of aspiration with free drinking of thin liquids via straw.  Silent aspiration cannot be ruled out at bedside.    DYSPHAGIA CRITERIA: Risk of aspiration, swallow delay    FUNCTIONAL ASSESSMENT INSTRUMENT: Patient currently scored a level 6 of 7 on Functional Communication Measures for swallowing indicating a 1-19% limitation in function.    ASSESSMENT/ PLAN OF CARE:  Pt presents with limitations, noted below, that impede patient's ability to tolerate regular solids and thin liquids safely independently. The skills of a therapist will be required to safely and effectively implement the following treatment plan to restore maximal level of function.    PROBLEMS:  1.  Risk of aspiration, swallow delay   LTG 1: 30 days.  Patient will increase functional communication measures for swallowing to level 7 of 7, indicating 0% limitation in function.   STG 1a: 14 days.  Patient will tolerate least restrictive diet of regular soft solids, thin liquids with minimal to no signs or symptoms of aspiration.   STG 1b: 14 days.  Patient will tolerate least restrictive diet of regular soft solids, thin liquids utilizing compensatory strategies with minimal cueing.   TREATMENT: Speech therapy for dysphagia, education of strategies and tolerance of least restrictive diet.      FREQUENCY/DURATION: Daily, 5 days a week    REHAB POTENTIAL:  Pt has good rehab potential.  The following limitations may influence improvement/ length of tx medical status.    RECOMMENDATIONS:   1.   DIET: Regular soft solids, thin liquids    2.  POSITION: Fully upright for all p.o. intake, 30 minutes following    3.  COMPENSATORY STRATEGIES: Small bites and sips, assist for self-feeding, single controlled sips of thin liquids, meds whole with single sip of liquid    Pt/responsible party agrees with plan of care and has been informed of all alternatives, risks and benefits.                  Anticipated Discharge Disposition (SLP): home with assist (11/15/23 1039)                                                                  EDUCATION  The patient has been educated in the following areas:   Dysphagia (Swallowing Impairment).              Time Calculation:    Time Calculation- SLP       Row Name 11/15/23 1040             Time Calculation- SLP    SLP Start Time 0830  -SN      SLP Stop Time 0930  -SN      SLP Time Calculation (min) 60 min  -SN      SLP Received On 11/15/23  -SN         Untimed Charges    [id number] Eval Oral Pharyng Swallow Minutes 60  -SN         Total Minutes    Untimed Charges Total Minutes 60  -SN       Total Minutes 60  -SN                User Key  (r) = Recorded By, (t) = Taken By, (c) = Cosigned By      Initials Name Provider Type    SN Debra Cooper MS-CCC/SLP, DARCY Speech and Language Pathologist                    Therapy Charges for Today       Code Description Service Date Service Provider Modifiers Qty    [id number] HC ST EVAL ORAL PHARYNG SWALLOW 4 11/15/2023 Debra Cooper MS-CCC/SLP, DARCY GN 1                 GABRIELLA Graves/DARCY JOSE  11/15/2023

## 2023-11-15 NOTE — CASE MANAGEMENT/SOCIAL WORK
Discharge Planning Assessment   [facility]     Patient Name: Lennie Gomez  MRN: [id number]  Today's Date: 11/15/2023    Admit Date: 11/14/2023    Plan: Pt lives at home with 12 year old grandson. Grandson is staying with family while Pt is in the hospital . Pharm: Shruthi Blackwell, PCP: MOUSTAPHA Patterson. Pt has good support from family. Per Semaj Mcarthur, Pt had CABG on 9/1/23 and Vascular stent on 9/29/23. [facility] Daughter feels Pt will like to return home with [facility]. SW will make [facility] referral to [facility]. SW will continue to follow for needs   Discharge Needs Assessment       Row Name 11/15/23 1523       Living Environment    People in Home child(sury), adult;grandchild(sury)    Current Living Arrangements home    In the past 12 months has the electric, gas, oil, or water company threatened to shut off services in your home? No    Primary Care Provided by self    Provides Primary Care For no one    Family Caregiver if Needed child(sury), adult    Quality of Family Relationships helpful;supportive;involved    Able to Return to Prior Arrangements yes       Resource/Environmental Concerns    Resource/Environmental Concerns none    Transportation Concerns none       Food Insecurity    Within the past 12 months, you worried that your food would run out before you got the money to buy more. Never true    Within the past 12 months, the food you bought just didn't last and you didn't have money to get more. Never true       Transition Planning    Patient/Family Anticipates Transition to home with family    Patient/Family Anticipated Services at Transition home health care    Transportation Anticipated family or friend will provide       Discharge Needs Assessment    Equipment Currently Used at Home none    Concerns to be Addressed discharge planning    Anticipated Changes Related to Illness none    Equipment Needed After Discharge none    Discharge Coordination/Progress Pt lives at home with 12 year  old grandson. Grandson is staying with family while Pt is in the hospital . Pharm: Shruthi Blackwell, PCP: MOUSTAPHA Patterson. Pt has good support from family. Per Semaj Mcarthur, Pt had CABG on 9/1/23 and Vascular stent on 9/29/23. [facility]. Daughter feels Pt will like to return home with C. SW will make HHC referral to [facility]. SW will continue to follow for needs.                   Discharge Plan       Row Name 11/15/23 1528       Plan    Plan Pt lives at home with 12 year old grandson. Grandson is staying with family while Pt is in the hospital . Pharm: Shruthi Blackwell, PCP: MOUSTAPHA Patterson. Pt has good support from family. Per Semaj Mcarthur, Pt had CABG on 9/1/23 and Vascular stent on 9/29/23. Pt is current with [facility]. Daughter feels Pt will like to return home with HHC. SW will make HHC referral to [facility]. SW will continue to follow for needs                  Continued Care and Services - Admitted Since 11/14/2023    Coordination has not been started for this encounter.       Selected Continued Care - Prior Encounters Includes continued care and service providers with selected services from prior encounters from 8/16/2023 to 11/15/2023      Discharged on 9/8/2023 Admission date: 8/31/2023 - Discharge disposition: Home-Health Care Svc      Durable Medical Equipment       Service Provider Selected Services Address Phone Fax Patient Preferred    [facility] [address] [facility] [place] [id number] [phone] --              Home Medical Care       Service Provider Selected Services Address Phone Fax Patient Preferred    [facility] [address] [address] [phone] --                             Demographic Summary       Row Name 11/15/23 1520       General Information    Admission Type inpatient    Arrived From home    Referral  Source admission list    Reason for Consult discharge planning    Preferred Language English       Contact Information    Permission Granted to Share Info With lay caregiver    Contact Information Obtained for lay caregiver       Lay Caregiver Information    Name, Lay Caregiver Blue Aguirre    Phone, Lay Caregiver [phone]                   Functional Status       Row Name 11/15/23 1521       Functional Status    Usual Activity Tolerance good    Current Activity Tolerance good       Physical Activity    On average, how many days per week do you engage in moderate to strenuous exercise (like a brisk walk)? 0 days    On average, how many minutes do you engage in exercise at this level? 0 min    Number of minutes of exercise per week 0       Assessment of Health Literacy    How often do you have someone help you read hospital materials? Never    How often do you have problems learning about your medical condition because of difficulty understanding written information? Never    How often do you have a problem understanding what is told to you about your medical condition? Never    How confident are you filling out medical forms by yourself? Quite a bit    Health Literacy Good       Functional Status, IADL    Medications independent    Meal Preparation independent    Housekeeping independent    Laundry independent    Shopping independent    IADL Comments Pt did have CABG on Sept 1st 2023, Vacsular stent placed Spet. 29th, 2023       Mental Status    General Appearance WDL WDL       Mental Status Summary    Recent Changes in Mental Status/Cognitive Functioning no changes       Employment/    Employment Status retired                   Psychosocial    No documentation.                  Abuse/Neglect    No documentation.                  Legal       Row Name 11/15/23 1523       Financial Resource Strain    How hard is it for you to pay for the very basics like food, housing, medical care, and heating? Not hard        Financial/Legal    Source of Income social security    Application for Public Assistance not applied       Legal    Criminal Activity/Legal Involvement none                   Substance Abuse    No documentation.                  Patient Forms    No documentation.                     Jane Del Angel

## 2023-11-15 NOTE — PLAN OF CARE
Goal Outcome Evaluation:     Pt very sleepy and difficult to arouse this shift, unable to swallow meds, NIH scale score of 26 this, on-coming RN updated on patient's status.

## 2023-11-15 NOTE — THERAPY EVALUATION
Acute Care - Physical Therapy Initial Evaluation   [facility]     Patient Name: Lennie Goemz  : 1955  MRN: [id number]  Today's Date: 11/15/2023      Visit Dx:     ICD-10-CM ICD-9-CM   1. Seizures  R56.9 780.39   2. Oropharyngeal dysphagia  R13.12 787.22   3. Decreased activities of daily living (ADL)  Z78.9 V49.89   4. Difficulty walking  R26.2 719.7     Patient Active Problem List   Diagnosis    Preoperative clearance    Abnormal nuclear stress test    CAD, multiple vessel    Coronary atherosclerosis due to calcified coronary lesion (CODE)    CAD (coronary artery disease)    Type 2 diabetes mellitus with hyperglycemia    Hypertension, essential    Elevated cholesterol    Diabetic polyneuropathy associated with type 2 diabetes mellitus    CVA (cerebral vascular accident)     Past Medical History:   Diagnosis Date    Arthritis     Coronary artery disease     CVA (cerebral vascular accident) 2023    Diabetes mellitus     Elevated cholesterol     Elevated cholesterol 2023    Hypertension     Myocardial infarction      Past Surgical History:   Procedure Laterality Date    CARDIAC CATHETERIZATION      CARDIAC CATHETERIZATION N/A 2023    Procedure: Left Heart Cath with possible coronary angioplasty;  Surgeon: Surendra Sprague MD;  Location: [facility] CATH INVASIVE LOCATION;  Service: Cardiology;  Laterality: N/A;     SECTION      CORONARY ARTERY BYPASS GRAFT N/A 2023    Procedure: SCOTT STERNOTOMY CORONARY ARTERY BYPASS GRAFT TIMES 4 USING LEFT INTERNAL MAMMARY ARTERY AND RIGHT GREATER SAPHENOUS VEIN GRAFT PER ENDOSCOPIC VEIN HARVESTING AND PRP;  Surgeon: Damir Aaron MD;  Location: [facility];  Service: Cardiothoracic;  Laterality: N/A;    CORONARY ARTERY BYPASS GRAFT  2023     PT Assessment (last 12 hours)       PT Evaluation and Treatment       Row Name 11/15/23 1438          Physical Therapy Time and Intention    Subjective Information no complaints   -DP     Document Type evaluation  -DP     Mode of Treatment individual therapy;physical therapy  -DP     Patient Effort good  -DP       Row Name 11/15/23 1438          General Information    Patient Profile Reviewed yes  -DP     Patient Observations cooperative;agree to therapy;alert  -DP     Prior Level of Function independent:;gait;transfer;bed mobility;ADL's  -DP     Equipment Currently Used at Home none  -DP     Existing Precautions/Restrictions fall  -DP     Barriers to Rehab none identified  -DP       Row Name 11/15/23 1438          Living Environment    Current Living Arrangements home  -DP     Home Accessibility stairs to enter home  -DP     People in Home child(sury), adult;grandchild(sury)  -DP       Row Name 11/15/23 1438          Home Main Entrance    Number of Stairs, Main Entrance none  -DP       Row Name 11/15/23 1438          Cognition    Orientation Status (Cognition) oriented to;person  -DP     Follows Commands (Cognition) follows one-step commands;increased processing time needed  -DP       Row Name 11/15/23 1438          Range of Motion (ROM)    Range of Motion bilateral lower extremities;ROM is WFL  -DP       Row Name 11/15/23 1438          Strength (Manual Muscle Testing)    Strength (Manual Muscle Testing) other (see comments)  RLE: 3+/5; LLE: 4-/5  -DP       Row Name 11/15/23 1438          Bed Mobility    Supine-Sit [place] (Bed Mobility) moderate assist (50% patient effort);1 person assist  -DP     Sit-Supine [place] (Bed Mobility) moderate assist (50% patient effort);1 person assist  -DP     Bed Mobility, Safety Issues decreased use of arms for pushing/pulling;decreased use of legs for bridging/pushing  -DP       Row Name 11/15/23 1438          Transfers    Transfers sit-stand transfer  -DP       Row Name 11/15/23 1438          Sit-Stand Transfer    Sit-Stand [place] (Transfers) minimum assist (75% patient effort)  -DP       Row Name 11/15/23 1438          Gait/Stairs  (Locomotion)    Gait/Stairs Locomotion gait/ambulation assistive device  -DP     [place] Level (Gait) minimum assist (75% patient effort)  -DP     Assistive Device (Gait) walker, front-wheeled  -DP     Distance in Feet (Gait) 5  -DP       Row Name 11/15/23 8881          Balance    Dynamic Standing Balance minimal assist  -DP     Position/Device Used, Standing Balance supported;walker, rolling  -DP       Row Name 11/15/23 6200          Plan of Care Review    Plan of Care Reviewed With patient  -DP     Outcome Evaluation Patient presents with decree strength, transfers, and functional mobility.  She will benefit from inpatient PT services and placement in a rehab facility upon discharge.  -DP       Row Name 11/15/23 1450          Positioning and Restraints    Pre-Treatment Position in bed  -DP     Post Treatment Position bed  -DP     In Bed exit alarm on;encouraged to call for assist;call light within reach  -DP       Row Name 11/15/23 1055          Therapy Assessment/Plan (PT)    Rehab Potential (PT) good, to achieve stated therapy goals  -DP     Criteria for Skilled Interventions Met (PT) yes;meets criteria  -DP     Therapy Frequency (PT) daily  -DP     Predicted Duration of Therapy Intervention (PT) 10 days  -DP     Problem List (PT) problems related to;balance;mobility;strength  -DP     Activity Limitations Related to Problem List (PT) unable to transfer safely;unable to ambulate safely  -DP       Row Name 11/15/23 4200          PT Evaluation Complexity    History, PT Evaluation Complexity no personal factors and/or comorbidities  -DP     Examination of Body Systems (PT Eval Complexity) total of 4 or more elements  -DP     Clinical Presentation (PT Evaluation Complexity) stable  -DP     Clinical Decision Making (PT Evaluation Complexity) low complexity  -DP     Overall Complexity (PT Evaluation Complexity) low complexity  -DP       Row Name 11/15/23 6616          Physical Therapy Goals    Transfer Goal  Selection (PT) transfer, PT goal 1  -DP     Gait Training Goal Selection (PT) gait training, PT goal 1  -DP       Row Name 11/15/23 1314          Transfer Goal 1 (PT)    Activity/Assistive Device (Transfer Goal 1, PT) sit-to-stand/stand-to-sit  -DP     [place] Level/Cues Needed (Transfer Goal 1, PT) standby assist  -DP     Time Frame (Transfer Goal 1, PT) 10 days  -DP       Row Name 11/15/23 9950          Gait Training Goal 1 (PT)    Activity/Assistive Device (Gait Training Goal 1, PT) assistive device use;walker, rolling  -DP     [place] Level (Gait Training Goal 1, PT) standby assist  -DP     Distance (Gait Training Goal 1, PT) 200  -DP     Time Frame (Gait Training Goal 1, PT) 10 days  -DP               User Key  (r) = Recorded By, (t) = Taken By, (c) = Cosigned By      Initials Name Provider Type    DP Gilda Wilcox, PT Physical Therapist                      PT Recommendation and Plan  Anticipated Discharge Disposition (PT): inpatient rehabilitation facility  Planned Therapy Interventions (PT): balance training, bed mobility training, gait training, neuromuscular re-education, strengthening, transfer training  Therapy Frequency (PT): daily  Plan of Care Reviewed With: patient  Outcome Evaluation: Patient presents with decree strength, transfers, and functional mobility.  She will benefit from inpatient PT services and placement in a rehab facility upon discharge.   Outcome Measures       Row Name 11/15/23 4572             How much help from another person do you currently need...    Turning from your back to your side while in flat bed without using bedrails? 3  -DP      Moving from lying on back to sitting on the side of a flat bed without bedrails? 2  -DP      Moving to and from a bed to a chair (including a wheelchair)? 3  -DP      Standing up from a chair using your arms (e.g., wheelchair, bedside chair)? 3  -DP      Climbing 3-5 steps with a railing? 2  -DP      To walk in hospital room? 2  -DP       AM-PAC 6 Clicks Score (PT) 15  -DP      Highest Level of Mobility Goal 4 --> Transfer to chair/commode  -DP         Functional Assessment    Outcome Measure Options AM-PAC 6 Clicks Basic Mobility (PT)  -DP                User Key  (r) = Recorded By, (t) = Taken By, (c) = Cosigned By      Initials Name Provider Type    Gilda Dao, PT Physical Therapist                     Time Calculation:    PT Charges       Row Name 11/15/23 1453             Time Calculation    PT Received On 11/15/23  -DP      PT Goal Re-Cert Due Date 11/24/23  -DP         Untimed Charges    PT Eval/Re-eval Minutes 40  -DP         Total Minutes    Untimed Charges Total Minutes 40  -DP       Total Minutes 40  -DP                User Key  (r) = Recorded By, (t) = Taken By, (c) = Cosigned By      Initials Name Provider Type    Gilda Dao, PT Physical Therapist                      PT G-Codes  Outcome Measure Options: AM-PAC 6 Clicks Basic Mobility (PT)  AM-PAC 6 Clicks Score (PT): 15  AM-PAC 6 Clicks Score (OT): 15    Gilda Wilcox, PT  11/15/2023

## 2023-11-15 NOTE — PROGRESS NOTES
[facility]   Hospitalist Progress Note  Date: 11/15/2023  Patient Name: Lennie Gomez  : 1955  MRN: [id number]  Date of admission: 2023  Consultants:   -Teleneurology    Subjective   Subjective     Chief Complaint: Found down by family member    Summary:   Lennie Gomez is a 68 y.o. female with CAD with recent CABG, carotid artery stenosis with stent, anxiety/depression, sleep apnea, type 2 diabetes mellitus and history of juvenile epilepsy that presented after being found down.  Patient was found by her grandson with vomitus all around her pillow, EMS arrived and patient reportedly had another episode that looked like a tonic-clonic seizure and patient urinated on herself, she was brought to ED for further evaluation.  Eval in ED significant for patient being significantly hypertensive.  CMP showed anion gap metabolic acidosis with bicarb 18.2, anion gap of 23 and lactate of 12.6.  CT head showed indeterminate left frontal hypodensity.  CTA neck showed left internal carotid artery stent and approximately 61% stenosis of left internal carotid artery due to deformity of the stents, 65% stenosis of right internal carotid artery due to combination of calcified and noncalcified.  Patient was given a total of 4 mg of Ativan and 2000 mg of Keppra in the ED.  Hospitalist service contact for further evaluation management.  Teleneurology consulted.  Following admission patient developed fever and empiric treatment for meningitis was started.    Interval Followup:   MRI brain obtained and no acute intracranial abnormality noted, left T2 flair hyperintensity in the left MCA distribution.  Patient found to be postictal.  Patient somnolent but arousable on my exam.  Able to answer some questions and follows some commands but slow to do so.  Nursing with no additional acute issues to report.    Antibiotics:   -Ceftriaxone  -Ampicillin  -Vancomycin    Objective   Objective     Vitals:   Temp:  [98 °F (36.7  °C)-102.1 °F (38.9 °C)] 98 °F (36.7 °C)  Heart Rate:  [] 100  Resp:  [16-18] 18  BP: (133-227)/(58-92) 153/63  Flow (L/min):  [2-4] 2  Physical Exam   Gen: Acutely ill-appearing female, appears older than stated age, somnolent but arousable  Resp: CTAB, No w/r/r, No respiratory distress appreciated  Card: Regular rhythm, tachycardic, No m/r/g  Abd: Soft, Nontender, Nondistended, + bowel sounds  Neuro: Right-sided facial droop noted, right upper extremity drift noted    Result Review    Result Review:  I have personally reviewed the results as below and agree with these findings:  []  Laboratory:   CMP          9/30/2023    12:36 11/14/2023    10:33 11/15/2023    04:35   CMP   Glucose  393  146    BUN  12  11    Creatinine  0.94  0.65    EGFR  66.6  96.6    Sodium  138  142    Potassium 4.1     4.3  3.1    Chloride  96  105    Calcium  9.2  8.3    Total Protein  8.2  6.8    Albumin  4.8  3.8    Globulin  3.4  3.0    Total Bilirubin  0.3  0.3    Alkaline Phosphatase  114  69    AST (SGOT)  37  18    ALT (SGPT)  34  21    Albumin/Globulin Ratio  1.4  1.3    BUN/Creatinine Ratio  12.8  16.9    Anion Gap  23.8  8.3       Details          This result is from an external source.             CBC          9/30/2023    03:32 11/14/2023    10:33 11/15/2023    04:35   CBC   WBC 5.43     10.20  8.96    RBC 2.76     4.03  3.34    Hemoglobin 8.3     12.1  10.0    Hematocrit 26.1     40.3  32.1    MCV 94.6     100.0  96.1    MCH 30.1     30.0  29.9    MCHC 31.8     30.0  31.2    RDW 13.7     14.6  14.8    Platelets 139     316  210       Details          This result is from an external source.           Magnesium within normal limits.  Hemoglobin A1c: 7.40.  [x]  Microbiology: Blood culture (11 4023): No growth to date  [x]  Radiology: MRI brain imaging personally reviewed.  Underlying vascular malformation in right frontal lobe noted.  Old lacunar infarct within the thalamic area.  [x]  EKG/Telemetry:    []   Cardiology/Vascular:    []  Pathology:  []  Old records:  []  Other:    Assessment & Plan   Assessment / Plan     Assessment:  Seizures  Right-sided weakness likely secondary to old stroke  History of juvenile myoclonic epilepsy  Fever, concern for meningitis  Therapeutic drug level monitoring, vancomycin  Right frontal AVM  61% stenosis of left ICA and 65% stenosis of right ICA s/p left ICA stent placement  Hypokalemia  Essential hypertension  CAD s/p CABG  Type 2 diabetes mellitus    Plan:  -Teleneurology consulted and following, appreciate assistance and recommendations in the care of this patient.  -EEG ordered  -Given concern for meningitis continue empiric antibiotic treatment.  Lumbar puncture ordered.  Check INR.  -Monitor vancomycin level  -Continue Keppra 1 g twice daily  -Management discussed with neurology.  Continue seizure precautions, EEG to be obtained.  -Outpatient neurosurgery consult for right frontal AVM to be placed at discharge  -PT/OT consulted  -Speech therapy consulted  -Continue aspirin and statin  -Start home Plavix  -Start home metoprolol  -Continue seizure precautions  -Replace potassium IV  -As needed Ativan available for seizure activity  -Continue SSI.  Monitor blood glucose level and SSI karma titrate insulin regimen accordingly  -Will monitor electrolytes and renal function with BMP and magnesium level in the AM  -Will monitor WBC and Hgb with CBC in the AM  -Clinical course will dictate further management     DVT Prophylaxis: SCDs  Diet: NPO  Dispo: PT/OT consulted  Code Status: Full code     Personally reviewed patients labs and imaging, discussed with patient and nurse at bedside. Discussed management with the following consultants: Teleneurology.     Part of this note may be an electronic transcription/translation of spoken language to printed text using the Dragon dictation system.    DVT prophylaxis:  Mechanical DVT prophylaxis orders are present.    CODE STATUS:   Level Of  Support Discussed With: Patient  Code Status (Patient has no pulse and is not breathing): CPR (Attempt to Resuscitate)  Medical Interventions (Patient has pulse or is breathing): Full Support      Electronically signed by Tony Segovia MD, 11/15/23, 12:29 PM EST.

## 2023-11-15 NOTE — PROGRESS NOTES
[facility]  [facility] Tele[facility] Consult [facility]    Routine Consult Follow-Up    Patient Name:   Lennie Gomez  YOB: 1955  Identification Number:   MRN - [id number]  Date of Service:   11/15/2023 09:43:06    Diagnosis        R56.9 - Seizures        R53.1 - Weakness    Impression  A 68-year-old woman with a history of history HTN, CAD s/p elective CABG, DM, and HLD was brought in for new-onset seizure and on exam was moving the right side less than the left. She was postictal and had no seizure activity on Ceribel at that time. She had a history of Juvenile myoclonic epilepsy and has been seizure-free as an adult.    FINDINGS:  - CTH without contrast - age indeterminate left frontal hypodensity  - MRI of the brain without contrast - no acute intracranial abnormality. Left T2 flare hyperintensity in the L MCA distribution  - CTA of the head and neck - Right frontal AVM. No large vessel occlusion or high-grade stenosis. Left carotid stent.    DIAGNOSIS:  1. Seizures. She has risk factors for stroke: old stroke, AVM, and childhood epilpesy  2. Right-sided weakness; likely secondary to an old stroke seen as left T2 flair hyperintensity as she had a history of right-hand weakness 2 months ago which improved later on. The hyperintensity can be exaggerated by seizures; REpeat MRI of the brain after one month to see if this resovles; if not, then changes are from stroke.  3. History of Juvenile myoclonic epilepsy. Seizure-free as an adult.  4. Right frontal AVM. Outpatient neurosurgery consult for the AVM.  5. 61% stenosis of L ICA and 65% stenosis of R ICA s/p left ICA stent placement    PLAN:  - Continue Keppra 1g bid.  - Routine EEG pending.  - LP pending (had fever on 11/14)  - Outpatient MRI of the brain after one month.  - Seizure precautions including no driving.  - Outpatient neurosurgery consult for the right frontal AVM  - Neurology will follow the patient    Our recommendations  are outlined below    Nursing Recommendations :  Continue with Telemetry    Consultations :  Recommend Speech therapy if failed dysphagia screenPhysical therapy/Occupational therapy    Disposition :  Neurology will follow    Subjective  The patient was seen and examined.  She has a history of LETICIA, but no seizures since childhood.      Examination    Neuro Exam:  General: Alert,Awake, Place, Person  Month - October Year - not really Place - hospital    Speech: Fluent:    Language: Intact:    Face: Facial Droop: Right    Facial Sensation: Intact:    Extraocular Movements: Intact:    Motor Exam: Drift: RUE    Sensation: Intact:    Coordination: Intact:          Patient/Family was informed the Neurology Consult would happen via TeleHealth consult by way of interactive audio and video telecommunications and consented to receiving care in this manner.    Telehealth Neurology consultation was provided. I spent 25 minutes providing telehealth care. This includes time spent for face to face visit via telemedicine, review of medical records, imaging studies and discussion of findings with providers, the patient and/or family.      Dr Min Marcano      [facility]  For Inpatient follow-up with [facility] physician please call [facility] [phone]. This is not an outpatient service. Post hospital discharge, please contact hospital directly.

## 2023-11-15 NOTE — PROGRESS NOTES
"[facility] Clinical Pharmacy Services: Vancomycin Monitoring Note    Lennie Gomez is a 68 y.o. female who is on day 2 of pharmacy to dose vancomycin for CNS Infection.    Previous Vancomycin Dose:   750 mg IV every  12  hours  Imaging Reviewed?: Yes  Updated Cultures and Sensitivities:   Cx pending    Vitals/Labs  Ht: 157.5 cm (62.01\"); Wt: 68.7 kg (151 lb 7.3 oz)   Temp (24hrs), Av.4 °F (37.4 °C), Min:97.4 °F (36.3 °C), Max:102.1 °F (38.9 °C)   Estimated Creatinine Clearance: 76.2 mL/min (by C-G formula based on SCr of 0.65 mg/dL).       Results from last 7 days   Lab Units 11/15/23  0435 23  1033   VANCOMYCIN RM mcg/mL 11.40  --    CREATININE mg/dL 0.65 0.94   WBC 10*3/mm3 8.96 10.20     Assessment/Plan  Vancomycin random was 11.4 this morning. Will increase vancomycin to 1000mg IV q12h.   Current Vancomycin Dose:  1000 mg IV every 12 hours; which provides the following predicted parameters:  AUC24,ss: 575 mg/L.hr  PAUC*: 94 %  Ctrough,ss: 17.7 mg/L  Pconc*: 36 %  Tox.: 14 %  Next Vanc Random ordered for  at 0600  We will continue to monitor patient changes and renal function     Thank you for involving pharmacy in this patient's care. Please contact pharmacy with any questions or concerns.    Beverley Harden [facility]  Clinical Pharmacist  "

## 2023-11-15 NOTE — PLAN OF CARE
Goal Outcome Evaluation:      ASSESSMENT/ PLAN OF CARE:  Pt presents with limitations, noted below, that impede patient's ability to tolerate regular solids and thin liquids safely independently. The skills of a therapist will be required to safely and effectively implement the following treatment plan to restore maximal level of function.    PROBLEMS:  1.  Risk of aspiration, swallow delay  TREATMENT: Speech therapy for dysphagia, education of strategies and tolerance of least restrictive diet.      FREQUENCY/DURATION: Daily, 5 days a week    REHAB POTENTIAL:  Pt has good rehab potential.  The following limitations may influence improvement/ length of tx medical status.    RECOMMENDATIONS:   1.   DIET: Regular soft solids, thin liquids    2.  POSITION: Fully upright for all p.o. intake, 30 minutes following    3.  COMPENSATORY STRATEGIES: Small bites and sips, assist for self-feeding, single controlled sips of thin liquids, meds whole with single sip of liquid               Anticipated Discharge Disposition (SLP): home with assist

## 2023-11-15 NOTE — PLAN OF CARE
Goal Outcome Evaluation:  Plan of Care Reviewed With: patient           Outcome Evaluation: Patient has experienced decline in function from baseline status, presenting w/ deficits related to cognition, balance, strength, coordination, safety awareness, transfers and mobility that impede patient independence with activities of daily living.  Patient would benefit from skilled Occupational Therapy intervention to maxamize patient safety, and promote return to baseline independence.      Anticipated Discharge Disposition (OT): inpatient rehabilitation facility

## 2023-11-15 NOTE — PLAN OF CARE
Goal Outcome Evaluation:  Plan of Care Reviewed With: patient           Outcome Evaluation: Patient presents with decree strength, transfers, and functional mobility.  She will benefit from inpatient PT services and placement in a rehab facility upon discharge.      Anticipated Discharge Disposition (PT): inpatient rehabilitation facility

## 2023-11-15 NOTE — THERAPY EVALUATION
Patient Name: Lennie Gomez  : 1955    MRN: [id number]                              Today's Date: 11/15/2023       Admit Date: 2023    Visit Dx:     ICD-10-CM ICD-9-CM   1. Seizures  R56.9 780.39   2. Oropharyngeal dysphagia  R13.12 787.22   3. Decreased activities of daily living (ADL)  Z78.9 V49.89     Patient Active Problem List   Diagnosis    Preoperative clearance    Abnormal nuclear stress test    CAD, multiple vessel    Coronary atherosclerosis due to calcified coronary lesion (CODE)    CAD (coronary artery disease)    Type 2 diabetes mellitus with hyperglycemia    Hypertension, essential    Elevated cholesterol    Diabetic polyneuropathy associated with type 2 diabetes mellitus    CVA (cerebral vascular accident)     Past Medical History:   Diagnosis Date    Arthritis     Coronary artery disease     CVA (cerebral vascular accident) 2023    Diabetes mellitus     Elevated cholesterol     Elevated cholesterol 2023    Hypertension     Myocardial infarction      Past Surgical History:   Procedure Laterality Date    CARDIAC CATHETERIZATION      CARDIAC CATHETERIZATION N/A 2023    Procedure: Left Heart Cath with possible coronary angioplasty;  Surgeon: Surendra Sprague MD;  Location: [facility] CATH INVASIVE LOCATION;  Service: Cardiology;  Laterality: N/A;     SECTION      CORONARY ARTERY BYPASS GRAFT N/A 2023    Procedure: SCOTT STERNOTOMY CORONARY ARTERY BYPASS GRAFT TIMES 4 USING LEFT INTERNAL MAMMARY ARTERY AND RIGHT GREATER SAPHENOUS VEIN GRAFT PER ENDOSCOPIC VEIN HARVESTING AND PRP;  Surgeon: Damir Aaron MD;  Location: [facility];  Service: Cardiothoracic;  Laterality: N/A;    CORONARY ARTERY BYPASS GRAFT  2023      General Information       Row Name 11/15/23 1425          OT Time and Intention    Document Type evaluation  -ES     Mode of Treatment individual therapy;occupational therapy  -ES       Row Name 11/15/23 1425           "General Information    Patient Profile Reviewed yes  -ES     Prior Level of Function --  Patient is oriented to self only, no family present to verify prior level. Patient reports she lives with her daughter. No device for mobility. Standing shower completion. Elmer in stance. No home O2. May be inaccurate historian.  -ES     Existing Precautions/Restrictions fall  -ES     Barriers to Rehab cognitive status  -ES       Row Name 11/15/23 1425          Occupational Profile    Reason for Services/Referral (Occupational Profile) Patient is 68 yr old female admitted to [facility] on 11/14/2023 after being found down by family, reported seizure activity. OT evaluation and treatment ordered d/t recent decline in ADLs/transfer ability and discharge planning recommendations. No previous OT services for current condition.  -ES       Row Name 11/15/23 1425          Living Environment    People in Home grandchild(sury)  -ES       Row Name 11/15/23 1421          Cognition    Orientation Status (Cognition) oriented to;person  Patient oriented to self only. patient reports she is \"at her daughters house.\" Patient provided reorientation. No family present to assist with prior level reporting.  -ES       Row Name 11/15/23 1420          Safety Issues, Functional Mobility    Safety Issues Affecting Function (Mobility) awareness of need for assistance;insight into deficits/self-awareness  -ES     Impairments Affecting Function (Mobility) balance;endurance/activity tolerance;strength;cognition;motor control;grasp  -ES               User Key  (r) = Recorded By, (t) = Taken By, (c) = Cosigned By      Initials Name Provider Type    Comfort Scott, OTR/L, CSRS Occupational Therapist                     Mobility/ADL's       Row Name 11/15/23 6724          Bed Mobility    Bed Mobility supine-sit;sit-supine  -ES     Supine-Sit [place] (Bed Mobility) moderate assist (50% patient effort);1 person assist  -ES     Sit-Supine " [place] (Bed Mobility) moderate assist (50% patient effort);1 person assist  -ES     Bed Mobility, Safety Issues decreased use of arms for pushing/pulling;decreased use of legs for bridging/pushing  -ES       Row Name 11/15/23 [facility]          Transfers    Transfers sit-stand transfer;stand-sit transfer  -ES       Row Name 11/15/23 [facility]          Sit-Stand Transfer    Sit-Stand [place] (Transfers) minimum assist (75% patient effort);2 person assist  -ES     Assistive Device (Sit-Stand Transfers) other (see comments)  A  -ES       Row Name 11/15/23 [facility]          Stand-Sit Transfer    Stand-Sit [place] (Transfers) minimum assist (75% patient effort);2 person assist  -ES     Assistive Device (Stand-Sit Transfers) other (see comments)  HHA  -ES       Row Name 11/15/23 [facility]          Activities of Daily Living    BADL Assessment/Intervention bathing;upper body dressing;lower body dressing;grooming;feeding;toileting  -ES       Row Name 11/15/23 [facility]          Bathing Assessment/Intervention    [place]ud[place] Level (Bathing) bathing skills;moderate assist (50% patient effort)  -ES       Row Name 11/15/23 [facility]          Upper Body Dressing Assessment/Training    [place]udy Level (Upper Body Dressing) upper body dressing skills;minimum assist (75% patient effort)  -ES       Row Name 11/15/23 [facility]          Lower Body Dressing Assessment/Training    [place]udy Level (Lower Body Dressing) lower body dressing skills;maximum assist (25% patient effort)  -ES       Row Name 11/15/23 [facility]          Grooming Assessment/Training    [place]udy Level (Grooming) grooming skills;minimum assist (75% patient effort)  -ES       Row Name 11/15/23 [facility]          Self-Feeding Assessment/Training    [place]udy Level (Feeding) feeding skills;minimum assist (75% patient effort)  -ES       Row Name 11/15/23 [facility]          Toileting Assessment/Training    [place]udy Level (Toileting) toileting skills;dependent (less than 25% patient  effort)  -ES               User Key  (r) = Recorded By, (t) = Taken By, (c) = Cosigned By      Initials Name Provider Type    ES Comfort Aceves OTR/L, JULIO CESAR Occupational Therapist                   Obj/Interventions       Row Name 11/15/23 1435          Sensory Assessment (Somatosensory)    Sensory Assessment (Somatosensory) sensation intact  -ES       Row Name 11/15/23 1435          Vision Assessment/Intervention    Visual Impairment/Limitations WFL  -ES     Vision Assessment Comment patient presents with decreased awareness to right side during assessment  -ES       Row Name 11/15/23 1435          Range of Motion Comprehensive    Comment, General Range of Motion RUE AROM WFL. LUE AROM ~45° shoulder flexion  -ES       Row Name 11/15/23 1435          Strength Comprehensive (MMT)    General Manual Muscle Testing (MMT) Assessment upper extremity strength deficits identified;lower extremity strength deficits identified  -ES     Comment, General Manual Muscle Testing (MMT) Assessment LUE 4/5. RUE shoulder 2/5, biceps/triceps 2/5, wrist and digits 2-/5 with generalized weakness on right side at time of assessment  -ES       Row Name 11/15/23 1435          Motor Skills    Motor Skills functional endurance;coordination  -ES     Coordination moderate impairment;right;upper extremity;fine motor deficit;gross motor deficit  -ES     Functional Endurance fair minus  -ES       Row Name 11/15/23 1435          Balance    Balance Assessment sitting dynamic balance;standing dynamic balance  -ES     Dynamic Sitting Balance contact guard  -ES     Position, Sitting Balance supported;sitting edge of bed  -ES     Dynamic Standing Balance 1-person assist;moderate assist  -ES     Position/Device Used, Standing Balance supported;walker, front-wheeled  -ES               User Key  (r) = Recorded By, (t) = Taken By, (c) = Cosigned By      Initials Name Provider Type    Comfort Scott OTR/L, CSRS Occupational Therapist                    Goals/Plan       Row Name 11/15/23 1440          Transfer Goal 1 (OT)    Activity/Assistive Device (Transfer Goal 1, OT) transfers, all  -ES     [place] Level/Cues Needed (Transfer Goal 1, OT) modified independence  -ES     Time Frame (Transfer Goal 1, OT) long term goal (LTG);10 days  -ES       Row Name 11/15/23 1440          Bathing Goal 1 (OT)    Activity/Device (Bathing Goal 1, OT) bathing skills, all  -ES     [place] Level/Cues Needed (Bathing Goal 1, OT) modified independence  -ES     Time Frame (Bathing Goal 1, OT) long term goal (LTG);10 days  -ES       Row Name 11/15/23 1440          Dressing Goal 1 (OT)    Activity/Device (Dressing Goal 1, OT) dressing skills, all  -ES     [place]/Cues Needed (Dressing Goal 1, OT) modified independence  -ES     Time Frame (Dressing Goal 1, OT) long term goal (LTG);10 days  -ES       Row Name 11/15/23 1440          Toileting Goal 1 (OT)    Activity/Device (Toileting Goal 1, OT) toileting skills, all  -ES     [place] Level/Cues Needed (Toileting Goal 1, OT) modified independence  -ES     Time Frame (Toileting Goal 1, OT) long term goal (LTG);10 days  -ES       Row Name 11/15/23 1440          Grooming Goal 1 (OT)    Activity/Device (Grooming Goal 1, OT) grooming skills, all  -ES     [place] (Grooming Goal 1, OT) modified independence  -ES     Time Frame (Grooming Goal 1, OT) long term goal (LTG);10 days  -ES       Row Name 11/15/23 1440          Strength Goal 1 (OT)    Strength Goal 1 (OT) Pt will improve RUE strength to 3/5 muscle grade for increased UE strength required for ADL transfers and task completion  -ES     Time Frame (Strength Goal 1, OT) long term goal (LTG);10 days  -ES       Row Name 11/15/23 1440          Problem Specific Goal 1 (OT)    Problem Specific Goal 1 (OT) Patient will demonstrate fair graded task tolerance in preperation for independent ADL routine completion at time of discharge  -ES     Time Frame (Problem Specific Goal 1,  OT) long term goal (LTG);10 days  -ES       Row Name 11/15/23 1440          Therapy Assessment/Plan (OT)    Planned Therapy Interventions (OT) activity tolerance training;BADL retraining;functional balance retraining;occupation/activity based interventions;patient/caregiver education/training;strengthening exercise;ROM/therapeutic exercise;cognitive/visual perception retraining;neuromuscular control/coordination retraining;transfer/mobility retraining  -ES               User Key  (r) = Recorded By, (t) = Taken By, (c) = Cosigned By      Initials Name Provider Type    Comfort Scott, OTR/L, CSRS Occupational Therapist                   Clinical Impression       Row Name 11/15/23 1438          Plan of Care Review    Plan of Care Reviewed With patient  -ES     Outcome Evaluation Patient has experienced decline in function from baseline status, presenting w/ deficits related to cognition, balance, strength, coordination, safety awareness, transfers and mobility that impede patient independence with activities of daily living.  Patient would benefit from skilled Occupational Therapy intervention to maxamize patient safety, and promote return to baseline independence.  -ES       Row Name 11/15/23 1438          Therapy Assessment/Plan (OT)    Rehab Potential (OT) good, to achieve stated therapy goals  -ES     Criteria for Skilled Therapeutic Interventions Met (OT) yes;meets criteria;skilled treatment is necessary  -ES     Therapy Frequency (OT) 5 times/wk  -ES       Row Name 11/15/23 1438          Therapy Plan Review/Discharge Plan (OT)    Anticipated Discharge Disposition (OT) inpatient rehabilitation facility  -ES       Row Name 11/15/23 1435          Positioning and Restraints    Pre-Treatment Position in bed  -ES     Post Treatment Position bed  -ES     In Bed --  seizure pads in place  -ES               User Key  (r) = Recorded By, (t) = Taken By, (c) = Cosigned By      Initials Name Provider Type    KENA Aceves  Comfort, PARVEENR/L, CSRS Occupational Therapist                   Outcome Measures       Row Name 11/15/23 1441          How much help from another is currently needed...    Putting on and taking off regular lower body clothing? 2  -ES     Bathing (including washing, rinsing, and drying) 2  -ES     Toileting (which includes using toilet bed pan or urinal) 2  -ES     Putting on and taking off regular upper body clothing 3  -ES     Taking care of personal grooming (such as brushing teeth) 3  -ES     Eating meals 3  -ES     AM-[facility] 6 Clicks Score (OT) 15  -ES       Row Name 11/15/23 0705          How much help from another person do you currently need...    Turning from your back to your side while in flat bed without using bedrails? 2  -HW     Moving from lying on back to sitting on the side of a flat bed without bedrails? 2  -HW     Moving to and from a bed to a chair (including a wheelchair)? 2  -HW     Standing up from a chair using your arms (e.g., wheelchair, bedside chair)? 2  -HW     Climbing 3-5 steps with a railing? 2  -HW     To walk in hospital room? 2  -HW     AM-[facility] 6 Clicks Score (PT) 12  -HW     Highest Level of Mobility Goal 4 --> Transfer to chair/commode  -HW       Row Name 11/15/23 1441          Functional Assessment    Outcome Measure Options AM-[facility] 6 Clicks Daily Activity (OT);Optimal Instrument  -ES       Row Name 11/15/23 1441          Optimal Instrument    Optimal Instrument Optimal - 3  -ES     Bending/Stooping 3  -ES     Balancing 2  -ES     Standing 3  -ES     Reaching 3  -ES     From the list, choose the 3 activities you would most like to be able to do without any difficulty Bending/stooping;Standing;Balancing;Reaching  -ES     Total Score Optimal - 3 11  -ES               User Key  (r) = Recorded By, (t) = Taken By, (c) = Cosigned By      Initials Name Provider Type     Chelsey Monsalve, RN Registered Nurse    Comfort Scott, PARVEENR/L, CSRS Occupational Therapist                      OT  Recommendation and Plan  Planned Therapy Interventions (OT): activity tolerance training, BADL retraining, functional balance retraining, occupation/activity based interventions, patient/caregiver education/training, strengthening exercise, ROM/therapeutic exercise, cognitive/visual perception retraining, neuromuscular control/coordination retraining, transfer/mobility retraining  Therapy Frequency (OT): 5 times/wk  Plan of Care Review  Plan of Care Reviewed With: patient  Outcome Evaluation: Patient has experienced decline in function from baseline status, presenting w/ deficits related to cognition, balance, strength, coordination, safety awareness, transfers and mobility that impede patient independence with activities of daily living.  Patient would benefit from skilled Occupational Therapy intervention to maxamize patient safety, and promote return to baseline independence.     Time Calculation:   Evaluation Complexity (OT)  Review Occupational Profile/Medical/Therapy History Complexity: brief/low complexity  Assessment, Occupational Performance/Identification of Deficit Complexity: 3-5 performance deficits  Clinical Decision Making Complexity (OT): problem focused assessment/low complexity  Overall Complexity of Evaluation (OT): low complexity     Time Calculation- OT       Row Name 11/15/23 1443             Time Calculation- OT    OT Received On 11/15/23  -ES      OT Goal Re-Cert Due Date 11/24/23  -ES         Untimed Charges    OT Eval/Re-eval Minutes 35  -ES         Total Minutes    Untimed Charges Total Minutes 35  -ES       Total Minutes 35  -ES                User Key  (r) = Recorded By, (t) = Taken By, (c) = Cosigned By      Initials Name Provider Type    ES Comfort Aceves OTR/L, CSRS Occupational Therapist                  Therapy Charges for Today       Code Description Service Date Service Provider Modifiers Qty    [id number]  OT EVAL LOW COMPLEXITY 3 11/15/2023 Comfort Aceves, OTR/L, CSRS GO 1                  Comfort Aceves, OTR/L, CSRS  11/15/2023

## 2023-11-15 NOTE — PAYOR COMM NOTE
"Lennie Gomez (68 y.o. Female)     PATIENT INFORMATION  Name:  Lennie Gomez  MRN#:     [id number]  :  1955         ADMISSION INFORMATION  CLASS: Inpatient   DOS:  2023        CURRENT ATTENDING PROVIDER INFORMATION  Name/NPI: Tree Engel MD [id number]  Phone:  Phone: [phone]        RENDERING FACILITY  Name:  [facility]   NPI:  [id number]  TID:  [id number]  Address:      [address] [place] [address]  Phone  [phone]        CASE MANAGEMENT CONTACT INFORMATION  Phone:      [phone]  Fax:           [phone]                      Date of Birth   1955    Social Security Number       Address   [address] [place] [address]    Home Phone   [phone]    MRN   [id number]       Yazidi   Gnosticist    Marital Status                               Admission Date   23    Admission Type   Emergency    Admitting Provider   Tree Engel MD    Attending Provider   Tree Engel MD    Department, Room/Bed   [facility] PROGRESSIVE CARE UNIT, /       Discharge Date       Discharge Disposition       Discharge Destination                                 Attending Provider: Tree Engel MD    Allergies: Theophylline    Isolation: None   Infection: COVID (rule out) (23)   Code Status: CPR    Ht: 157.5 cm (62.01\")   Wt: 68.7 kg (151 lb 7.3 oz)    Admission Cmt: None   Principal Problem: CVA (cerebral vascular accident) [I63.9]                   Active Insurance as of 2023       Primary Coverage       Payor Plan Insurance Group Employer/Plan Group    HUMANA MEDICARE REPLACEMENT HUMANA MEDICARE REPLACEMENT [id number]       Payor Plan Address Payor Plan Phone Number Payor Plan Fax Number Effective Dates    [address] [address] [phone]  2022 - None Entered    [facility] [id number]         Subscriber Name Subscriber Birth Date Member ID       LENNIE GOMZE 1955 [id number]                    Seizure [facility] Inpatient Care     "   Indications Met   Last updated by Louann Talley RN on 11/14/2023 2002     Review Status Created By   Primary Completed Louann Talley RN      Criteria Review   Seizure [facility] Inpatient Care     Overall Determination: Indications Met     Criteria:  [×] Admission is indicated for  1 or more  of the following :      [×] Hemodynamic instability          11/14/2023  8:02 PM              -- 11/14/2023  8:02 PM by Louann Talley RN --                                    (X) Hemodynamic instability, as indicated by  1 or more  of the following  (1) (2) (3) (4) (5) (6):                  (X) Vital sign abnormality not readily corrected by appropriate treatment, as indicated by  1 or more  of the following  [A]:                  (X) Tachycardia that persists despite appropriate treatment (eg, volume repletion, treatment of pain, treatment of underlying cause)          11/14/2023  8:02 PM              -- 11/14/2023  8:02 PM by Louann Talley RN --                  -114 despite, IV NS 2000ml bolus; Ativan 2mg IV x2 doses. Labetalol IV.      [×] Status epilepticus [B]          11/14/2023  8:02 PM              -- 11/14/2023  8:02 PM by Louann Talley RN --                  Multiple tonic clonic seizures at home, en route with EMS, & In the ED. Had not had seizure activity since she was a teenager.      [×] Brain disorder (eg, tumor, edema, trauma, hydrocephalus, encephalitis, meningitis) that requires monitoring or intervention available only at inpatient level of care          11/14/2023  8:02 PM              -- 11/14/2023  8:02 PM by Louann Talley RN --                  Recent CVA & Carotid surgery. Now with AMS, seizures & fever. Concerns for meningitis.      [×] Altered mental status that is severe or persistent          11/14/2023  8:02 PM              -- 11/14/2023  8:02 PM by Louann Talley RN --                                 (X) Altered mental status (ie, different from baseline) that is severe or persistent, as  "indicated by  1 or more  of the following  (1) (2) (3) (4):                  (X) Coma (not arousable)          11/14/2023  8:02 PM              -- 11/14/2023  8:02 PM by Louann Talley RN --                  Found down at home. Unresponsive. Usually AAO      [×] New focal neurologic deficit (eg, postseizure)          11/14/2023  8:02 PM              -- 11/14/2023  8:02 PM by Louann Talley RN --                  Unresponsive. Not moving RUE & RLE (this is new)      [×] Recurrent seizure (ie, during emergency department or observation care) and  1 or more  of the following :          [×] Patient with baseline of infrequent seizures (ie, recurrence within short time frame uncharacteristic) and recurrence not thought to be due to readily modifiable etiology (eg, nonadherence, subtherapeutic drug level)              11/14/2023  8:02 PM                  -- 11/14/2023  8:02 PM by Louann Talley RN --                      Has not had seizures since a teenager     Notes:  -- 11/14/2023  8:02 PM by Louann Talley RN --      To ED after \"found down\" in bed covered in emesis & unresponsive. EMS called & pt began seizing (tonic clonic). Several more seizures in ED.  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 IN ED: -117. //84.  Temp later spiked to 102.1. On exam: withdrawing to pain. Not following commands. Post-ictal. Later staff noted pt not moving RUE/RLE.       Recently had 4vessel CABG on 9/1. On 9/29 had L carotid stent placement.       PMHx: DM; Seizure (juvenile); ; Carotid artery stenosis  w/ stent; JENNY; ? CVA.       IN ED: 1st trop 18; Anion gap 23.8; CO2 18; Glucose 393; AST 37; ALT 34; Lactate 12.6;       EKG: ST (111) w/RBBB.  CT head: NAD. Generalized parenchymal volume loss w/encephalomalacia involving L frontal/Parietal lobes.  Multiple calcifications within the right frontal lobe related to known AVM.                  IN ED: Keppra 2gm IVPB; Ativan 2mg IV x2; " Labetalol 5mg IV; IV NS 1000ml bolus x2; IV LR X1; Zofran IV prn.       Neuro consult;       Admit: Telemetry. Neuro checks q4h; Neuro consult; EEG; ECHO; O2; MRI brain; PT/OT'/SLP; KEppra IV q12h; Vanc 1.25gm IV x1. then Vanc 750 IV q12h (for CNS infection); Rocephin IV q12h (CNS infection); Ampicillin 2gm IV q4h (Central nervous system infection); ASA pr qd.                   Note: Concerns for meningitis after spiking temp.                        History & Physical        Tree Engel MD at 23 1414           [facility] HISTORY AND PHYSICAL  Date: 2023   Patient Name: Lennie Gomez  : 1955  MRN: [id number]  Primary Care Physician:  Jacqui Patterson APRN  Date of admission: 2023    Subjective  Subjective     Chief Complaint: Found down by family member  HPI:    Lennie Gomez is a 68 y.o. female past medical history of coronary disease with recent CABG, carotid artery stenosis with stent, anxiety/depression, obstructive sleep apnea, type 2 diabetes, and seizure disorder presents after being found down    History is obtained primarily from the patient and the ER physician.  It sounds like her grandson found her down with vomitus all around her pillow.  Then EMS showed up and the patient had another episode look like a tonic-clonic seizure.  At that time she did urinate herself per her family were.  She was placed in the ambulance and brought to the emergency department.  He had no fevers.  No chills.  No other infectious symptoms.    Vital signs are as follows temperature 97.4, pulse 109, blood pressure 190/92, satting well on room air.  CBC shows no specific abnormalities.  CMP shows an anion gap metabolic acidosis with a bicarb of 18.2 and anion gap of 23 with a lactate of 12.6.  Urinalysis is bland with exception of elevated glucose and trace ketones.  Chest x-ray shows no abnormalities.  CTA of the head shows no significant change in right frontal lobe  arteriovenous malfunction.  No evidence of hemorrhage.  CTA of the neck interval placement of left internal carotid artery stent there is approximately 61% stenosis of the left internal carotid artery due to the deformity of the stent.  65% stenosis of the right internal carotid artery due to combination of calcified and noncalcified plaque.  Patient was given a total of 4 mg of Ativan and 2000 mg of Keppra.  She was also given 2 L of normal saline.  I asked the emergency department to consult teleneurology before admit the patient.    Cannot review systems    Personal History     Past Medical History:  Coronary artery disease status post CABG  Carotid artery stenosis  Anxiety/depression  Obstructive sleep apnea  Type 2 diabetes  CVA during admission post CABG  Seizure disorder  Ischemic cardiomyopathy with EF of 55% and 2023    Past Surgical History:  Cardiac catheterization    Coronary artery bypass  Carotid artery stent      Family History:   Coronary artery disease    Social History:   Former smoker.  No alcohol.    Home Medications:  Insulin Glargine, aspirin, atorvastatin, bumetanide, clopidogrel, ergocalciferol, insulin detemir, metFORMIN, metoprolol succinate XL, polyethylene glycol, and venlafaxine    Allergies:  Allergies   Allergen Reactions    Theophylline Hives         Objective  Objective     Vitals:   Temp:  [97.4 °F (36.3 °C)] 97.4 °F (36.3 °C)  Heart Rate:  [104-117] 109  Resp:  [16] 16  BP: (156-230)/() 190/92    Physical Exam    Constitutional: Sleepy.  No respiratory distress.   Eyes: Pupils equal, sclerae anicteric, no conjunctival injection   HENT: NCAT, mucous membranes moist   Neck: Supple, no thyromegaly, no lymphadenopathy, trachea midline   Respiratory: Clear to auscultation bilaterally, nonlabored respirations    Cardiovascular: RRR, no murmurs, rubs, or gallops, palpable pedal pulses bilaterally   Gastrointestinal: Positive bowel sounds, soft, nontender,  nondistended   Musculoskeletal: No bilateral ankle edema, no clubbing or cyanosis to extremities   Psychiatric: Appropriate affect, cooperative   Neurologic: Oriented x 0, strength symmetric in all extremities, Cranial Nerves grossly intact to confrontation, speech clear   Skin: No rashes     Result Review   Result Review:  I have personally reviewed the results from the time of this admission to 11/14/2023 14:15 EST and agree with these findings:  Lactate was 12.6 which is trended down to 3.9      Assessment & Plan  Assessment / Plan     Assessment/Plan:   Acute metabolic encephalopathy  Fever concerning for possible meningitis  Seizure  Ischemic stroke  Coronary artery disease with recent CABG  Carotid artery stenosis with formant he and stent in the left carotid artery  Type 2 diabetes with hyperglycemia  Metabolic acidosis due to lactic acid post likely from seizure  Acute seizure    Lennie is a 68-year-old female with past medical history of coronary artery disease status post CABG in September, carotid artery stenosis with stent that now shows deformity, and history of seizure disorder in the past, and type 2 diabetes who presented after being found down.  Initially there was concern for seizure disorder which was witnessed by EMS.  Patient was given 4 mg of Ativan and 2 g of Keppra.  Then the patient appeared postictal.  At that time I went to the ER and examined the patient was concerned that she continued to have nonconvulsive status. Cerebel showed no seizure burden.  I asked the emergency department to consult teleneurology due to my concern for nonconvulsive status.  Teleneurology saw the patient was concern for seizure and CVA.  They did not think she was in status.  Later on in the afternoon the patient spiked a fever to 101.2.  At this time, I am concerned for possible meningitis.  Start broad-spectrum antibiotics.  As long as patient is stable require an LP tomorrow.  MRI is  pending.      Plan:  --Admit to hospitalist service  -- Consult teleneurology  -- Ischemic stroke order set and check glucoses every 4 hours  --Sliding scale insulin every 4 hours due to severe hyperglycemia  -- CTA of the head neck was reviewed and shows deformity of left carotid artery stent and 65 stenosis of the right.  -- MRI of the brain is pending  -- Echocardiogram/intraoperative SCOTT in September shows EF of 55% and no thrombus with global normal function  -- Patient was given 4 mg of IV Ativan  -- 2 g of Keppra  -- Continue 1 g of Keppra every 12 hours per neurology  --EEG tomorrow  -- Patient spiked a fever after being admitted with seizure and acute metabolic encephalopathy concern for possible meningitis  -- Start vancomycin/ceftriaxone/ampicillin  -- Send COVID-19/flu/RSV  -- Strep and Legionella urine antigen  -- Procalcitonin now and in the morning  -- Repeat lactic acid        DVT prophylaxis:  SCDs    CODE STATUS:     Full code    Admission Status:  I believe this patient meets admission status.    Electronically signed by Tree Engel MD, 11/14/23, 2:15 PM EST.             Electronically signed by Tree Engel MD at 11/14/23 1732          Consult Notes (last 24 hours)        Camilla Oseguera MD at 11/14/23 1601          [facility] Tele[facility]urology Consult Services    Stat Consult    Patient Name:   Lennie Gomez  YOB: 1955  Identification Number:   MRN - [id number]  Date of Service:   11/14/2023 14:19:33    Diagnosis:        R56.9 - Seizures    Impression  67 y/o woman with history of history htn, cad, dm, hld who was brought in new onset seizure and on exam is moving the right side less than the left. She is postictal and unable to participate in exam. No current seizure activity on Ceribel.    Is seizure provoked (hyperglycemia, ?DKA) or due to u/l structural abnormality in the brain.    Head ct shows age indeterminate left frontal hypodensity that seems to spare the  cortex. Recommend stat MRI brain w/and w/o to see if stroke and if so is it acute vs chronic VS tumor.    Please continue keppra 1gm bid. When able, routine EEG.      Recommendations:  Our recommendations are outlined below.    Diagnostic Studies :  MRI brain w/wo contrast   (Concern for other intracranial pathology that requires MRI brain with contrast) Routine EEG    Medications :  keppra 1gm bid    Nursing Recommendations :  Maintain Euglycemia and EuthermiaNeuro checks q1-2 hrs during ICU stay if criticalOnce stable neuro checks q 4hrs    Seizure precautions :  Seizure precautions including no driving for state mandated time frame were discussed with patient with clear understanding    DVT Prophylaxis :  Choice of Primary Team    Disposition :  Neurology will follow      ----------------------------------------------------------------------------------------------------      Advanced Imaging:  CTA Head and Neck Completed.    CTP Completed.    LVO:No    Patient in not a candidate for AZALIA        Metrics:  [facility] Notification Time: 11/14/2023 14:17:38  Stamp Time: 11/14/2023 14:19:33  Callback Response Time: 11/14/2023 14:24:44    Primary Provider Notified of Diagnostic Impression and Management Plan on: 11/14/2023 15:05:55      CT HEAD:  Reviewed  left frontal ?age of stroke vs edema. will need mri brain w/and w/o to better characterize      Labs  initial lactate 12.6 now 3.9/  cmp w/ anion gap 23.8      ----------------------------------------------------------------------------------------------------    Chief Complaint:  seizure    History of Present Illness:  Patient is a 68 year old Female.  Lennie Gomez is a 69 y/o woman with history of history htn, cad, dm, hld who was brought in new onset seizure.    Patient was found today in her bed at home, covered in vomit. EMS arrived and witnessed a seizure. She has had two seizures in the ED (last seizure was. She has recvd 4mg Ativan and 2g Kep    Dgtrs  are present. 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 Not recent illness. She had recent 9/1 cabg 4 vessel and then on 9/29 left carotid stent placement. She quit smoking on 8/31/2023. They deny any h/o stroke.    BIBIN 11/13/2023 at 2100  MRS 0        Past Medical History:       Hypertension       Diabetes Mellitus       Hyperlipidemia       Coronary Artery Disease    Medications:    No Anticoagulant use   Antiplatelet use: Yes asa and plavix  Reviewed EMR for current medications    Allergies:   Reviewed    Social History:  Smoking: Former    Family History:    There is no family history of premature cerebrovascular disease pertinent to this consultation    ROS :  14 Points Review of Systems was performed and was negative except mentioned in HPI.    Past Surgical History:  There Is No Surgical History Contributory To Today’s Visit    NIHSS may not be reliable due to: multiple sz today, recvd ativan 4mg, 2g keppra  Examination:  BP(190/78), Pulse(113), Blood Glucose(393)  1A: Level of Consciousness - Movements to Pain + 2  1B: Ask Month and Age - Could Not Answer Either Question Correctly + 2  1C: Blink Eyes & Squeeze Hands - Performs 0 Tasks + 2  2: Test Horizontal Extraocular Movements - Normal + 0  3: Test Visual Fields - No Visual Loss + 0  4: Test Facial Palsy (Use Grimace if Obtunded) - Normal symmetry + 0  5A: Test Left Arm Motor Drift - No Effort Against Gravity + 3  5B: Test Right Arm Motor Drift - No Movement + 4  6A: Test Left Leg Motor Drift - No Effort Against Gravity + 3  6B: Test Right Leg Motor Drift - No Effort Against Gravity + 3  7: Test Limb Ataxia (FNF/Heel-Shin) - No Ataxia + 0  8: Test Sensation - Complete Loss: Cannot Sense Being Touched At All + 2  9: Test Language/Aphasia - Mute/Global Aphasia: No Usable Speech/Auditory Comprehension + 3  10: Test Dysarthria - Mute/Anarthric + 2  11: Test Extinction/Inattention - No abnormality + 0    NIHSS Score: 26    Spoke with :  Allinder        Patient / Family was informed the Neurology Consult would occur via TeleHealth consult by way of interactive audio and video telecommunications and consented to receiving care in this manner.    Patient is being evaluated for possible acute neurologic impairment and high probability of imminent or life - threatening deterioration.I spent total of 35 minutes providing care to this patient, including time for face to face visit via telemedicine, review of medical records, imaging studies and discussion of findings with providers, the patient and / or family.      Dr Camilla Oseguera      [facility]  For Inpatient follow-up with [facility] physician please call [facility] [phone]. This is not an outpatient service. Post hospital discharge, please contact hospital directly.        Electronically signed by Camilla Oseguera MD at 11/14/23 [id number]

## 2023-11-16 LAB
ANION GAP SERPL CALCULATED.3IONS-SCNC: 6.9 MMOL/L (ref 5–15)
BUN SERPL-MCNC: 7 MG/DL (ref 8–23)
BUN/CREAT SERPL: 10.6 (ref 7–25)
CALCIUM SPEC-SCNC: 8.5 MG/DL (ref 8.6–10.5)
CHLORIDE SERPL-SCNC: 104 MMOL/L (ref 98–107)
CO2 SERPL-SCNC: 27.1 MMOL/L (ref 22–29)
CREAT SERPL-MCNC: 0.66 MG/DL (ref 0.57–1)
DEPRECATED RDW RBC AUTO: 52.7 FL (ref 37–54)
EGFRCR SERPLBLD CKD-EPI 2021: 96.3 ML/MIN/1.73
ERYTHROCYTE [DISTWIDTH] IN BLOOD BY AUTOMATED COUNT: 14.6 % (ref 12.3–15.4)
GLUCOSE BLDC GLUCOMTR-MCNC: 130 MG/DL (ref 70–99)
GLUCOSE BLDC GLUCOMTR-MCNC: 137 MG/DL (ref 70–99)
GLUCOSE BLDC GLUCOMTR-MCNC: 160 MG/DL (ref 70–99)
GLUCOSE BLDC GLUCOMTR-MCNC: 162 MG/DL (ref 70–99)
GLUCOSE BLDC GLUCOMTR-MCNC: 169 MG/DL (ref 70–99)
GLUCOSE SERPL-MCNC: 167 MG/DL (ref 65–99)
HCT VFR BLD AUTO: 29 % (ref 34–46.6)
HCT VFR BLD AUTO: 29.7 % (ref 34–46.6)
HCT VFR BLD AUTO: 31.3 % (ref 34–46.6)
HGB BLD-MCNC: 8.6 G/DL (ref 12–15.9)
HGB BLD-MCNC: 8.9 G/DL (ref 12–15.9)
HGB BLD-MCNC: 9.5 G/DL (ref 12–15.9)
IRON 24H UR-MRATE: 38 MCG/DL (ref 37–145)
IRON SATN MFR SERPL: 13 % (ref 20–50)
MAGNESIUM SERPL-MCNC: 2 MG/DL (ref 1.6–2.4)
MCH RBC QN AUTO: 29.6 PG (ref 26.6–33)
MCHC RBC AUTO-ENTMCNC: 30.4 G/DL (ref 31.5–35.7)
MCV RBC AUTO: 97.5 FL (ref 79–97)
PHOSPHATE SERPL-MCNC: 2 MG/DL (ref 2.5–4.5)
PLATELET # BLD AUTO: 176 10*3/MM3 (ref 140–450)
PMV BLD AUTO: 10.2 FL (ref 6–12)
POTASSIUM SERPL-SCNC: 3.3 MMOL/L (ref 3.5–5.2)
QT INTERVAL: 387 MS
QTC INTERVAL: 527 MS
RBC # BLD AUTO: 3.21 10*6/MM3 (ref 3.77–5.28)
SODIUM SERPL-SCNC: 138 MMOL/L (ref 136–145)
TIBC SERPL-MCNC: 286 MCG/DL (ref 298–536)
TRANSFERRIN SERPL-MCNC: 192 MG/DL (ref 200–360)
VANCOMYCIN SERPL-MCNC: 12.2 MCG/ML (ref 5–40)
WBC NRBC COR # BLD: 7.71 10*3/MM3 (ref 3.4–10.8)

## 2023-11-16 PROCEDURE — 63710000001 INSULIN LISPRO (HUMAN) PER 5 UNITS: Performed by: INTERNAL MEDICINE

## 2023-11-16 PROCEDURE — 25810000003 SODIUM CHLORIDE 0.9 % SOLUTION: Performed by: INTERNAL MEDICINE

## 2023-11-16 PROCEDURE — 25010000002 VANCOMYCIN 5 G RECONSTITUTED SOLUTION: Performed by: INTERNAL MEDICINE

## 2023-11-16 PROCEDURE — 82948 REAGENT STRIP/BLOOD GLUCOSE: CPT

## 2023-11-16 PROCEDURE — 99231 SBSQ HOSP IP/OBS SF/LOW 25: CPT | Performed by: PSYCHIATRY & NEUROLOGY

## 2023-11-16 PROCEDURE — 85014 HEMATOCRIT: CPT | Performed by: INTERNAL MEDICINE

## 2023-11-16 PROCEDURE — 80202 ASSAY OF VANCOMYCIN: CPT | Performed by: INTERNAL MEDICINE

## 2023-11-16 PROCEDURE — 92526 ORAL FUNCTION THERAPY: CPT

## 2023-11-16 PROCEDURE — 25010000002 NA FERRIC GLUC CPLX PER 12.5 MG: Performed by: INTERNAL MEDICINE

## 2023-11-16 PROCEDURE — 84466 ASSAY OF TRANSFERRIN: CPT | Performed by: INTERNAL MEDICINE

## 2023-11-16 PROCEDURE — 94761 N-INVAS EAR/PLS OXIMETRY MLT: CPT

## 2023-11-16 PROCEDURE — 97116 GAIT TRAINING THERAPY: CPT

## 2023-11-16 PROCEDURE — 99233 SBSQ HOSP IP/OBS HIGH 50: CPT | Performed by: INTERNAL MEDICINE

## 2023-11-16 PROCEDURE — 25010000002 CEFTRIAXONE PER 250 MG: Performed by: INTERNAL MEDICINE

## 2023-11-16 PROCEDURE — 80048 BASIC METABOLIC PNL TOTAL CA: CPT | Performed by: INTERNAL MEDICINE

## 2023-11-16 PROCEDURE — 25010000002 ENOXAPARIN PER 10 MG: Performed by: INTERNAL MEDICINE

## 2023-11-16 PROCEDURE — 63710000001 INSULIN DETEMIR PER 5 UNITS: Performed by: INTERNAL MEDICINE

## 2023-11-16 PROCEDURE — 94799 UNLISTED PULMONARY SVC/PX: CPT

## 2023-11-16 PROCEDURE — 25010000002 AMPICILLIN PER 500 MG: Performed by: INTERNAL MEDICINE

## 2023-11-16 PROCEDURE — 85018 HEMOGLOBIN: CPT | Performed by: INTERNAL MEDICINE

## 2023-11-16 PROCEDURE — 83540 ASSAY OF IRON: CPT | Performed by: INTERNAL MEDICINE

## 2023-11-16 PROCEDURE — 84100 ASSAY OF PHOSPHORUS: CPT | Performed by: INTERNAL MEDICINE

## 2023-11-16 PROCEDURE — 25010000002 LEVETIRACETAM IN NACL 0.75% 1000 MG/100ML SOLUTION: Performed by: INTERNAL MEDICINE

## 2023-11-16 PROCEDURE — 83735 ASSAY OF MAGNESIUM: CPT | Performed by: INTERNAL MEDICINE

## 2023-11-16 PROCEDURE — 85027 COMPLETE CBC AUTOMATED: CPT | Performed by: INTERNAL MEDICINE

## 2023-11-16 RX ORDER — ENOXAPARIN SODIUM 100 MG/ML
40 INJECTION SUBCUTANEOUS DAILY
Status: DISCONTINUED | OUTPATIENT
Start: 2023-11-16 | End: 2023-11-20 | Stop reason: HOSPADM

## 2023-11-16 RX ORDER — INSULIN LISPRO 100 [IU]/ML
2-7 INJECTION, SOLUTION INTRAVENOUS; SUBCUTANEOUS
Status: DISCONTINUED | OUTPATIENT
Start: 2023-11-16 | End: 2023-11-20 | Stop reason: HOSPADM

## 2023-11-16 RX ORDER — FENTANYL/ROPIVACAINE/NS/PF 2-625MCG/1
15 PLASTIC BAG, INJECTION (ML) EPIDURAL ONCE
Status: COMPLETED | OUTPATIENT
Start: 2023-11-16 | End: 2023-11-16

## 2023-11-16 RX ORDER — POTASSIUM CHLORIDE 750 MG/1
40 CAPSULE, EXTENDED RELEASE ORAL ONCE
Status: COMPLETED | OUTPATIENT
Start: 2023-11-16 | End: 2023-11-16

## 2023-11-16 RX ADMIN — AMPICILLIN SODIUM 2 G: 2 INJECTION, POWDER, FOR SOLUTION INTRAVENOUS at 04:49

## 2023-11-16 RX ADMIN — CEFTRIAXONE SODIUM 2000 MG: 2 INJECTION, POWDER, FOR SOLUTION INTRAMUSCULAR; INTRAVENOUS at 05:19

## 2023-11-16 RX ADMIN — Medication 10 ML: at 09:01

## 2023-11-16 RX ADMIN — AMPICILLIN SODIUM 2 G: 2 INJECTION, POWDER, FOR SOLUTION INTRAVENOUS at 00:54

## 2023-11-16 RX ADMIN — Medication 10 ML: at 20:32

## 2023-11-16 RX ADMIN — METOPROLOL SUCCINATE 100 MG: 50 TABLET, EXTENDED RELEASE ORAL at 09:01

## 2023-11-16 RX ADMIN — INSULIN LISPRO 2 UNITS: 100 INJECTION, SOLUTION INTRAVENOUS; SUBCUTANEOUS at 18:00

## 2023-11-16 RX ADMIN — ATORVASTATIN CALCIUM 80 MG: 40 TABLET, FILM COATED ORAL at 20:15

## 2023-11-16 RX ADMIN — POTASSIUM PHOSPHATE, MONOBASIC POTASSIUM PHOSPHATE, DIBASIC 15 MMOL: 224; 236 INJECTION, SOLUTION, CONCENTRATE INTRAVENOUS at 10:00

## 2023-11-16 RX ADMIN — CLOPIDOGREL BISULFATE 75 MG: 75 TABLET ORAL at 09:01

## 2023-11-16 RX ADMIN — SODIUM CHLORIDE 125 MG: 9 INJECTION, SOLUTION INTRAVENOUS at 15:04

## 2023-11-16 RX ADMIN — INSULIN LISPRO 2 UNITS: 100 INJECTION, SOLUTION INTRAVENOUS; SUBCUTANEOUS at 09:01

## 2023-11-16 RX ADMIN — ENOXAPARIN SODIUM 40 MG: 100 INJECTION SUBCUTANEOUS at 10:00

## 2023-11-16 RX ADMIN — VANCOMYCIN HYDROCHLORIDE 1000 MG: 5 INJECTION, POWDER, LYOPHILIZED, FOR SOLUTION INTRAVENOUS at 07:48

## 2023-11-16 RX ADMIN — POTASSIUM CHLORIDE 40 MEQ: 10 CAPSULE, COATED, EXTENDED RELEASE ORAL at 09:00

## 2023-11-16 RX ADMIN — LEVETIRACETAM 1000 MG: 10 INJECTION, SOLUTION INTRAVENOUS at 09:39

## 2023-11-16 RX ADMIN — SODIUM CHLORIDE 40 ML: 9 INJECTION, SOLUTION INTRAVENOUS at 07:49

## 2023-11-16 RX ADMIN — ASPIRIN 81 MG CHEWABLE TABLET 81 MG: 81 TABLET CHEWABLE at 09:01

## 2023-11-16 RX ADMIN — INSULIN DETEMIR 10 UNITS: 100 INJECTION, SOLUTION SUBCUTANEOUS at 10:01

## 2023-11-16 RX ADMIN — LEVETIRACETAM 1000 MG: 10 INJECTION, SOLUTION INTRAVENOUS at 20:15

## 2023-11-16 NOTE — PLAN OF CARE
Problem: Adult Inpatient Plan of Care  Goal: Absence of Hospital-Acquired Illness or Injury  Intervention: Prevent Infection  Recent Flowsheet Documentation  Taken 11/15/2023 1900 by Sue Zhou, RN  Infection Prevention: hand hygiene promoted   Goal Outcome Evaluation: Patient A/O x4, able to move all extremities but right arm seems weaker. NIH 2.

## 2023-11-16 NOTE — PLAN OF CARE
Goal Outcome Evaluation:                      NIH per Chelsey PANG.  Pt has no acute complaints at this time.

## 2023-11-16 NOTE — THERAPY TREATMENT NOTE
Acute Care - Physical Therapy Treatment Note   Susan     Patient Name: Lennie Gomez  : 1955  MRN: [id number]  Today's Date: 2023      Visit Dx:     ICD-10-CM ICD-9-CM   1. Seizures  R56.9 780.39   2. Oropharyngeal dysphagia  R13.12 787.22   3. Decreased activities of daily living (ADL)  Z78.9 V49.89   4. Difficulty walking  R26.2 719.7   5. Difficulty in walking  R26.2 719.7     Patient Active Problem List   Diagnosis    Preoperative clearance    Abnormal nuclear stress test    CAD, multiple vessel    Coronary atherosclerosis due to calcified coronary lesion (CODE)    CAD (coronary artery disease)    Type 2 diabetes mellitus with hyperglycemia    Hypertension, essential    Elevated cholesterol    Diabetic polyneuropathy associated with type 2 diabetes mellitus    CVA (cerebral vascular accident)     Past Medical History:   Diagnosis Date    Arthritis     Coronary artery disease     CVA (cerebral vascular accident) 2023    Diabetes mellitus     Elevated cholesterol     Elevated cholesterol 2023    Hypertension     Myocardial infarction      Past Surgical History:   Procedure Laterality Date    CARDIAC CATHETERIZATION      CARDIAC CATHETERIZATION N/A 2023    Procedure: Left Heart Cath with possible coronary angioplasty;  Surgeon: Surendra Sprague MD;  Location: [facility] CATH INVASIVE LOCATION;  Service: Cardiology;  Laterality: N/A;     SECTION      CORONARY ARTERY BYPASS GRAFT N/A 2023    Procedure: SCOTT STERNOTOMY CORONARY ARTERY BYPASS GRAFT TIMES 4 USING LEFT INTERNAL MAMMARY ARTERY AND RIGHT GREATER SAPHENOUS VEIN GRAFT PER ENDOSCOPIC VEIN HARVESTING AND PRP;  Surgeon: Damir Aaron MD;  Location: [facility];  Service: Cardiothoracic;  Laterality: N/A;    CORONARY ARTERY BYPASS GRAFT  2023     PT Assessment (last 12 hours)       PT Evaluation and Treatment       Row Name 23 1400          Physical Therapy Time and Intention     Subjective Information no complaints (P)   -ZT     Document Type therapy note (daily note) (P)   -ZT     Mode of Treatment individual therapy;physical therapy (P)   -ZT     Patient Effort good (P)   -ZT       Row Name 11/16/23 1400          General Information    Patient Profile Reviewed yes (P)   -ZT     Patient Observations alert;cooperative;agree to therapy (P)   -ZT     Existing Precautions/Restrictions fall (P)   -ZT       Row Name 11/16/23 1400          Bed Mobility    Bed Mobility bed mobility (all) activities (P)   -ZT     All Activities, McDuffie (Bed Mobility) standby assist (P)   -ZT     Assistive Device (Bed Mobility) head of bed elevated (P)   -ZT       Row Name 11/16/23 1400          Transfers    Transfers sit-stand transfer;stand-sit transfer (P)   -ZT       Row Name 11/16/23 1400          Sit-Stand Transfer    Sit-Stand [place]ie (Transfers) standby assist;other (see comments) (P)   Pt dizzy upon standing  -ZT     Assistive Device (Sit-Stand Transfers) walker, front-wheeled (P)   -ZT       Row Name 11/16/23 1400          Stand-Sit Transfer    Stand-Sit [place]ie (Transfers) standby assist (P)   -ZT     Assistive Device (Stand-Sit Transfers) walker, front-wheeled (P)   -ZT       Row Name 11/16/23 1400          Gait/Stairs (Locomotion)    Gait/Stairs Locomotion gait/ambulation assistive device (P)   -ZT     [place]ie Level (Gait) standby assist (P)   -ZT     Assistive Device (Gait) walker, front-wheeled (P)   -ZT     Distance in Feet (Gait) 30 (P)   -ZT     Pattern (Gait) step-through (P)   -ZT     Deviations/Abnormal Patterns (Gait) latoya decreased;gait speed decreased;stride length decreased (P)   -ZT     Bilateral Gait Deviations forward flexed posture (P)   -ZT       Row Name 11/16/23 1400          Safety Issues, Functional Mobility    Impairments Affecting Function (Mobility) balance;endurance/activity tolerance;strength;cognition;motor control;grasp (P)   -ZT       Row Name  11/16/23 1400          Balance    Balance Assessment standing dynamic balance (P)   -ZT     Dynamic Standing Balance standby assist (P)   -ZT     Position/Device Used, Standing Balance walker, front-wheeled (P)   -ZT       Row Name 11/16/23 1400          Plan of Care Review    Plan of Care Reviewed With patient (P)   -ZT       Row Name 11/16/23 1400          Positioning and Restraints    Pre-Treatment Position in bed (P)   -ZT     Post Treatment Position bed (P)   -ZT     In Bed call light within reach;encouraged to call for assist;exit alarm on (P)   -ZT       Row Name 11/16/23 1400          Therapy Assessment/Plan (PT)    Rehab Potential (PT) good, to achieve stated therapy goals (P)   -ZT     Criteria for Skilled Interventions Met (PT) yes;skilled treatment is necessary (P)   -ZT     Therapy Frequency (PT) daily (P)   -ZT     Predicted Duration of Therapy Intervention (PT) 10 days (P)   -ZT     Problem List (PT) problems related to;balance;mobility;strength (P)   -ZT     Activity Limitations Related to Problem List (PT) unable to transfer safely;unable to ambulate safely (P)   -ZT       Row Name 11/16/23 1400          Progress Summary (PT)    Progress Toward Functional Goals (PT) progress toward functional goals is good (P)   -ZT     Daily Progress Summary (PT) Pt presented with mild dizziness upon standing, decreased activity tolerance, and endurance. She was able to tolerate a short walk today. She continues to require skilled PT services to address these deficits so that she can safely return to her PLOF. (P)   -ZT       Row Name 11/16/23 1400          Therapy Plan Review/Discharge Plan (PT)    Therapy Plan Review (PT) evaluation/treatment results reviewed;care plan/treatment goals reviewed;participants included;patient (P)   -ZT               User Key  (r) = Recorded By, (t) = Taken By, (c) = Cosigned By      Initials Name Provider Type    ZT Brian House, PT Student PT Student                      PT  Recommendation and Plan  Anticipated Discharge Disposition (PT): (P) inpatient rehabilitation facility  Planned Therapy Interventions (PT): (P) balance training, bed mobility training, gait training, stair training, strengthening, transfer training  Therapy Frequency (PT): (P) daily  Progress Summary (PT)  Progress Toward Functional Goals (PT): (P) progress toward functional goals is good  Daily Progress Summary (PT): (P) Pt presented with mild dizziness upon standing, decreased activity tolerance, and endurance. She was able to tolerate a short walk today. She continues to require skilled PT services to address these deficits so that she can safely return to her PLOF.  Plan of Care Reviewed With: (P) patient   Outcome Measures       Row Name 11/16/23 1500 11/15/23 6735          How much help from another person do you currently need...    Turning from your back to your side while in flat bed without using bedrails? 3 (P)   -ZT 3  -DP     Moving from lying on back to sitting on the side of a flat bed without bedrails? 3 (P)   -ZT 2  -DP     Moving to and from a bed to a chair (including a wheelchair)? 3 (P)   -ZT 3  -DP     Standing up from a chair using your arms (e.g., wheelchair, bedside chair)? 3 (P)   -ZT 3  -DP     Climbing 3-5 steps with a railing? 2 (P)   -ZT 2  -DP     To walk in hospital room? 3 (P)   -ZT 2  -DP     AM-PAC 6 Clicks Score (PT) 17 (P)   -ZT 15  -DP     Highest Level of Mobility Goal 5 --> Static standing (P)   -ZT 4 --> Transfer to chair/commode  -DP        Functional Assessment    Outcome Measure Options -- AM-PAC 6 Clicks Basic Mobility (PT)  -DP               User Key  (r) = Recorded By, (t) = Taken By, (c) = Cosigned By      Initials Name Provider Type    Gilda Dao, PT Physical Therapist    ZT Brian House PT Student PT Student                     Time Calculation:    PT Charges       Row Name 11/16/23 3541             Time Calculation    PT Received On 11/16/23 (P)   -ZT          Timed Charges    15115 - Gait Training Minutes  15 (P)   -ZT         Total Minutes    Timed Charges Total Minutes 15 (P)   -ZT       Total Minutes 15 (P)   -ZT                User Key  (r) = Recorded By, (t) = Taken By, (c) = Cosigned By      Initials Name Provider Type    ZT Brian House, PT Student PT Student                      PT G-Codes  Outcome Measure Options: AM-PAC 6 Clicks Basic Mobility (PT)  AM-PAC 6 Clicks Score (PT): (P) 17  AM-PAC 6 Clicks Score (OT): 15    Brian House, PT Student  11/16/2023

## 2023-11-16 NOTE — PROGRESS NOTES
[facility]   Hospitalist Progress Note  Date: 2023  Patient Name: Lennie Gomez  : 1955  MRN: [id number]  Date of admission: 2023  Consultants:   -Teleneurology    Subjective   Subjective     Chief Complaint: Found down by family member    Summary:   Lennie Gomez is a 68 y.o. female with CAD with recent CABG, carotid artery stenosis with stent, anxiety/depression, sleep apnea, type 2 diabetes mellitus and history of juvenile epilepsy that presented after being found down.  Patient was found by her grandson with vomitus all around her pillow, EMS arrived and patient reportedly had another episode that looked like a tonic-clonic seizure and patient urinated on herself, she was brought to ED for further evaluation.  Eval in ED significant for patient being significantly hypertensive.  CMP showed anion gap metabolic acidosis with bicarb 18.2, anion gap of 23 and lactate of 12.6.  CT head showed indeterminate left frontal hypodensity.  CTA neck showed left internal carotid artery stent and approximately 61% stenosis of left internal carotid artery due to deformity of the stents, 65% stenosis of right internal carotid artery due to combination of calcified and noncalcified.  Patient was given a total of 4 mg of Ativan and 2000 mg of Keppra in the ED.  Hospitalist service contact for further evaluation management.  Teleneurology consulted.  Following admission patient developed fever and empiric treatment for meningitis was started.  LP performed 11/15/2023.  MRI brain obtained and no acute intracranial abnormality was noted, left T2 flair hyperintensity in the left MCA distribution.    Interval Followup:   Patient's mentation much improved this morning.  Able to answer questions appropriately and no focal deficits were appreciated on exam.  EEG was done 11/15/2023 and per report low to medium voltage epileptiform discharges were noted over the left parietal region.  Patient continues on IV  Keppra.  She denies any chest pain or shortness of breath.  Nursing with no additional acute issues to report.    Antibiotics:   -Ceftriaxone  -Ampicillin  -Vancomycin    Objective   Objective     Vitals:   Temp:  [97.9 °F (36.6 °C)-99.1 °F (37.3 °C)] 98.2 °F (36.8 °C)  Heart Rate:  [66-90] 66  Resp:  [16-18] 16  BP: (109-144)/(41-61) 129/41  Flow (L/min):  [1.5-2] 1.5  Physical Exam   Gen: No acute distress, female sitting up in bed, appears older than stated age, conversant, pleasant  Resp: CTAB, No w/r/r, good aeration, equal chest rise bilaterally  Card: RRR, No m/r/g  Abd: Soft, Nontender, Nondistended, + bowel sounds    Result Review    Result Review:  I have personally reviewed the results as below and agree with these findings:  []  Laboratory:   CMP          11/14/2023    10:33 11/15/2023    04:35 11/16/2023    04:43   CMP   Glucose 393  146  167    BUN 12  11  7    Creatinine 0.94  0.65  0.66    EGFR 66.6  96.6  96.3    Sodium 138  142  138    Potassium 4.3  3.1  3.3    Chloride 96  105  104    Calcium 9.2  8.3  8.5    Total Protein 8.2  6.8     Albumin 4.8  3.8     Globulin 3.4  3.0     Total Bilirubin 0.3  0.3     Alkaline Phosphatase 114  69     AST (SGOT) 37  18     ALT (SGPT) 34  21     Albumin/Globulin Ratio 1.4  1.3     BUN/Creatinine Ratio 12.8  16.9  10.6    Anion Gap 23.8  8.3  6.9      CBC          11/14/2023    10:33 11/15/2023    04:35 11/16/2023    04:43   CBC   WBC 10.20  8.96  7.71    RBC 4.03  3.34  3.21    Hemoglobin 12.1  10.0  9.5    Hematocrit 40.3  32.1  31.3    .0  96.1  97.5    MCH 30.0  29.9  29.6    MCHC 30.0  31.2  30.4    RDW 14.6  14.8  14.6    Platelets 316  210  176    Phosphorus low.  Magnesium within normal limits.  [x]  Microbiology: Blood culture (11/14/2023): No growth today.  Meningitis panel (11/15/2023): Negative.  []  Radiology:  [x]  EKG/Telemetry:    []  Cardiology/Vascular:    []  Pathology:  []  Old records:  []  Other:    Assessment & Plan   Assessment /  Plan     Assessment:  Seizures  Right-sided weakness likely secondary to old stroke  History of juvenile myoclonic epilepsy  Hypokalemia, recurrent  Hypophosphatemia, new  Acute anemia, unclear if iron deficiency  Right frontal AVM  61% stenosis of left ICA and 65% stenosis of right ICA s/p left ICA stent placement  Essential hypertension  CAD s/p CABG  Type 2 diabetes mellitus    Plan:  -Teleneurology consulted and following, appreciate assistance and recommendations in the care of this patient.  -EEG obtained and reviewed as noted above.  -Continue Keppra  -Meningitis panel negative.  Discontinue antimicrobial treatment.  -Replace potassium and phosphorus IV  -Management discussed with Neurology.  Agree with continuing seizure precautions and continuing Keppra.  -Outpatient neurosurgery consult for right frontal AVM to be placed at discharge  -PT/OT consulted  -Speech therapy consulted  -Continue aspirin and statin  -Continue home Plavix  -Continue home metoprolol  -Continue seizure precautions  -As needed Ativan available for seizure activity  -Anemia noted.  Hemoglobin did downtrend.  Patient has received IV fluids.  He had signs of bleeding appreciated.  Will check iron panel and trend hemoglobin.  -Start Levemir and continue SSI.  Monitor blood glucose level and SSI requirement titrate insulin regimen accordingly.  -Monitor electrolytes and renal function with BMP, phosphorus level and magnesium level in the AM  -Monitor WBC and Hgb with CBC in the AM  -Clinical course will dictate further management     DVT Prophylaxis: SCDs  Diet: Cardiac/Diabetic  Dispo: PT/OT consulted  Code Status: Full code     Time spent personally reviewing patient's chart, labs and imaging, evaluating/examining the patient, discussing care plan with patient and nurse at bedside and discussing management with consultants (Teleneurology): 51 minutes.     Part of this note may be an electronic transcription/translation of spoken language  to printed text using the Dragon dictation system.    DVT prophylaxis:  Mechanical DVT prophylaxis orders are present.    CODE STATUS:   Level Of Support Discussed With: Patient  Code Status (Patient has no pulse and is not breathing): CPR (Attempt to Resuscitate)  Medical Interventions (Patient has pulse or is breathing): Full Support      Electronically signed by Tony Segovia MD, 11/16/23, 9:03 AM EST.

## 2023-11-16 NOTE — CONSULTS
Patient has continued to make drastic progress on improving diabetes and overall health. Patient had an HbA1c of 11.4% on 8/31/23 with a current HbA1c of 7.4%. Estimated average glucose has been reduced from 280 mg/dL to 166 mg/dL.

## 2023-11-16 NOTE — PROGRESS NOTES
[facility]  [facility] Consult Services    Routine Consult Follow-Up    Patient Name:   Lennie Gomez  YOB: 1955  Identification Number:   MRN - [id number]  Date of Service:   11/16/2023 11:02:45    Diagnosis        R56.9 - Seizures        R53.1 - Weakness    Impression  A 68-year-old woman with a history of history HTN, CAD s/p elective CABG, DM, and HLD was brought in for new-onset seizure and on exam was moving the right side less than the left. She was postictal and had no seizure activity on Ceribel at that time. She had a history of Juvenile myoclonic epilepsy and has been seizure-free as an adult.    FINDINGS:  - CTH without contrast - age indeterminate left frontal hypodensity  - MRI of the brain without contrast - no acute intracranial abnormality. Left T2 flare hyperintensity in the L MCA distribution  - CTA of the head and neck - Right frontal AVM. No large vessel occlusion or high-grade stenosis. Left carotid stent.  - CSF panel - WBC 1 glucose 112 protein 155, meningitis-encephalitis panel negative. Culture negative.    DIAGNOSIS:  1. Seizures. She has risk factors for stroke: old stroke, AVM, and childhood epilepsy  2. Right-sided weakness; likely secondary to an old stroke seen as left T2 flair hyperintensity as she had a history of right-hand weakness 2 months ago which improved later on. The hyperintensity can be could be seizure changes. REpeat MRI of the brain after one month to see if this resolves; if not, then changes are from stroke.  3. History of Juvenile myoclonic epilepsy. Seizure-free as an adult.  4. Right frontal AVM. Outpatient neurosurgery consult for the AVM.  5. 61% stenosis of L ICA and 65% stenosis of R ICA s/p left ICA stent placement    PLAN:  - Continue Keppra 1g bid.  - Outpatient MRI of the brain after one month.  - Seizure precautions including no driving.  - Outpatient neurosurgery consult for the right frontal AVM  - Neurology  will sign off.  - Outpatient neurology follow-up in 1-3 weeks.    Our recommendations are outlined below    Nursing Recommendations :  Delirium precautions: Blinds open during the day, closed at night, frequent reorientation, minimize nighttime interruptions    Disposition :  Outpatient Neurology follow up in 1-3 weeks    Subjective  The patient was seen and examined.      Examination    Neuro Exam:  General: Alert,Awake, Oriented to Time, Place, Person    Speech: Fluent:    Language: Intact:    Face: Facial Droop: Right    Facial Sensation: Intact:    Extraocular Movements: Intact:    Motor Exam: Drift: RUE    Sensation: Intact:    Coordination: Intact:          Patient/Family was informed the Neurology Consult would happen via TeleHealth consult by way of interactive audio and video telecommunications and consented to receiving care in this manner.    Telehealth Neurology consultation was provided. I spent 25 minutes providing telehealth care. This includes time spent for face to face visit via telemedicine, review of medical records, imaging studies and discussion of findings with providers, the patient and/or family.      Dr Min Marcano      [facility]  For Inpatient follow-up with [facility] physician please call [facility] [phone]. This is not an outpatient service. Post hospital discharge, please contact hospital directly.

## 2023-11-16 NOTE — THERAPY TREATMENT NOTE
Acute Care - Speech Language Pathology   Swallow Treatment Note  Susan     Patient Name: Lenine Gomez  : 1955  MRN: [id number]  Today's Date: 2023               Admit Date: 2023    Visit Dx:     ICD-10-CM ICD-9-CM   1. Seizures  R56.9 780.39   2. Oropharyngeal dysphagia  R13.12 787.22   3. Decreased activities of daily living (ADL)  Z78.9 V49.89   4. Difficulty walking  R26.2 719.7     Patient Active Problem List   Diagnosis    Preoperative clearance    Abnormal nuclear stress test    CAD, multiple vessel    Coronary atherosclerosis due to calcified coronary lesion (CODE)    CAD (coronary artery disease)    Type 2 diabetes mellitus with hyperglycemia    Hypertension, essential    Elevated cholesterol    Diabetic polyneuropathy associated with type 2 diabetes mellitus    CVA (cerebral vascular accident)     Past Medical History:   Diagnosis Date    Arthritis     Coronary artery disease     CVA (cerebral vascular accident) 2023    Diabetes mellitus     Elevated cholesterol     Elevated cholesterol 2023    Hypertension     Myocardial infarction      Past Surgical History:   Procedure Laterality Date    CARDIAC CATHETERIZATION      CARDIAC CATHETERIZATION N/A 2023    Procedure: Left Heart Cath with possible coronary angioplasty;  Surgeon: Surendra Sprague MD;  Location: [facility] CATH INVASIVE LOCATION;  Service: Cardiology;  Laterality: N/A;     SECTION      CORONARY ARTERY BYPASS GRAFT N/A 2023    Procedure: SCOTT STERNOTOMY CORONARY ARTERY BYPASS GRAFT TIMES 4 USING LEFT INTERNAL MAMMARY ARTERY AND RIGHT GREATER SAPHENOUS VEIN GRAFT PER ENDOSCOPIC VEIN HARVESTING AND PRP;  Surgeon: Damir Aaron MD;  Location: [facility];  Service: Cardiothoracic;  Laterality: N/A;    CORONARY ARTERY BYPASS GRAFT  2023       SLP Recommendation and Plan      SPEECH PATHOLOGY DYSPHAGIA TREATMENT    Subjective/Behavioral Observations: Awake,  cooperative        Day/time of Treatment: 11/16/2023        Current Diet: Soft to chew solids, thin liquids        Current Strategies: Small bites and sips        Treatment received: Focused on tolerance of current diet recommendations and use of compensatory strategies to decrease risk of aspiration        Results of treatment: Patient feeding self with assist for set up.  She consumed approximately 240 mL of thin liquids via straw with no overt signs or symptoms of aspiration.  Adequate chewing with soft solids with patient clearing oral residue independently.  Consumed approximately 80% of breakfast without overt signs or symptoms of aspiration.  Patient utilizing compensatory strategies with occasional cueing.        Progress toward goals: Goals met        Barriers to Achieving goals: N/A        Plan of care:/changes in plan: Patient has met her dysphagia goals.  No further direct speech pathology services are needed at this time.  Please re-refer if patient does demonstrate any decline in status.                                Anticipated Discharge Disposition (SLP): home with assist (11/15/23 1039)                                                                  EDUCATION  The patient has been educated in the following areas:   Dysphagia (Swallowing Impairment).              Time Calculation:    Time Calculation- SLP       Row Name 11/16/23 0948             Time Calculation- SLP    SLP Stop Time 0830  -SN      SLP Received On 11/16/23  -SN         Untimed Charges    15490-EF Treatment Swallow Minutes 45  -SN         Total Minutes    Untimed Charges Total Minutes 45  -SN       Total Minutes 45  -SN                User Key  (r) = Recorded By, (t) = Taken By, (c) = Cosigned By      Initials Name Provider Type    Debra Romero MS-CCC/SLP, CNT Speech and Language Pathologist                    Therapy Charges for Today       Code Description Service Date Service Provider Modifiers Qty    [id number] [facility] [place]  ORAL PHARYNG SWALLOW 4 11/15/2023 Debra Cooper MS-CCC/SLP, DARCY GN 1    [id number] HC ST TREATMENT SWALLOW 3 11/16/2023 Debra Cooper MS-CCC/SLP, DARCY GN 1                 GABRIELLA Graves/DAMON, DARCY  11/16/2023

## 2023-11-17 LAB
ANION GAP SERPL CALCULATED.3IONS-SCNC: 10.5 MMOL/L (ref 5–15)
BUN SERPL-MCNC: 8 MG/DL (ref 8–23)
BUN/CREAT SERPL: 13.8 (ref 7–25)
CALCIUM SPEC-SCNC: 8.7 MG/DL (ref 8.6–10.5)
CHLORIDE SERPL-SCNC: 107 MMOL/L (ref 98–107)
CO2 SERPL-SCNC: 25.5 MMOL/L (ref 22–29)
CREAT SERPL-MCNC: 0.58 MG/DL (ref 0.57–1)
CYTO UR: NORMAL
DEPRECATED RDW RBC AUTO: 53.8 FL (ref 37–54)
EGFRCR SERPLBLD CKD-EPI 2021: 99.3 ML/MIN/1.73
ERYTHROCYTE [DISTWIDTH] IN BLOOD BY AUTOMATED COUNT: 14.8 % (ref 12.3–15.4)
GLUCOSE BLDC GLUCOMTR-MCNC: 128 MG/DL (ref 70–99)
GLUCOSE BLDC GLUCOMTR-MCNC: 147 MG/DL (ref 70–99)
GLUCOSE BLDC GLUCOMTR-MCNC: 149 MG/DL (ref 70–99)
GLUCOSE BLDC GLUCOMTR-MCNC: 166 MG/DL (ref 70–99)
GLUCOSE SERPL-MCNC: 130 MG/DL (ref 65–99)
HCT VFR BLD AUTO: 29.2 % (ref 34–46.6)
HCT VFR BLD AUTO: 30.6 % (ref 34–46.6)
HCT VFR BLD AUTO: 31.3 % (ref 34–46.6)
HGB BLD-MCNC: 9 G/DL (ref 12–15.9)
HGB BLD-MCNC: 9.4 G/DL (ref 12–15.9)
HGB BLD-MCNC: 9.6 G/DL (ref 12–15.9)
LAB AP CASE REPORT: NORMAL
LAB AP CLINICAL INFORMATION: NORMAL
MAGNESIUM SERPL-MCNC: 2 MG/DL (ref 1.6–2.4)
MCH RBC QN AUTO: 30.3 PG (ref 26.6–33)
MCHC RBC AUTO-ENTMCNC: 30.7 G/DL (ref 31.5–35.7)
MCV RBC AUTO: 98.7 FL (ref 79–97)
PATH REPORT.FINAL DX SPEC: NORMAL
PATH REPORT.GROSS SPEC: NORMAL
PHOSPHATE SERPL-MCNC: 2.8 MG/DL (ref 2.5–4.5)
PLATELET # BLD AUTO: 164 10*3/MM3 (ref 140–450)
PMV BLD AUTO: 10.4 FL (ref 6–12)
POTASSIUM SERPL-SCNC: 3.7 MMOL/L (ref 3.5–5.2)
RBC # BLD AUTO: 3.17 10*6/MM3 (ref 3.77–5.28)
REAGIN AB CSF QL: NON REACTIVE
SODIUM SERPL-SCNC: 143 MMOL/L (ref 136–145)
WBC NRBC COR # BLD AUTO: 6.25 10*3/MM3 (ref 3.4–10.8)

## 2023-11-17 PROCEDURE — 25010000002 ENOXAPARIN PER 10 MG: Performed by: INTERNAL MEDICINE

## 2023-11-17 PROCEDURE — 84100 ASSAY OF PHOSPHORUS: CPT | Performed by: INTERNAL MEDICINE

## 2023-11-17 PROCEDURE — 82948 REAGENT STRIP/BLOOD GLUCOSE: CPT

## 2023-11-17 PROCEDURE — 83735 ASSAY OF MAGNESIUM: CPT | Performed by: INTERNAL MEDICINE

## 2023-11-17 PROCEDURE — 63710000001 INSULIN DETEMIR PER 5 UNITS: Performed by: INTERNAL MEDICINE

## 2023-11-17 PROCEDURE — 25010000002 LEVETIRACETAM IN NACL 0.75% 1000 MG/100ML SOLUTION: Performed by: INTERNAL MEDICINE

## 2023-11-17 PROCEDURE — 80048 BASIC METABOLIC PNL TOTAL CA: CPT | Performed by: INTERNAL MEDICINE

## 2023-11-17 PROCEDURE — 85014 HEMATOCRIT: CPT | Performed by: INTERNAL MEDICINE

## 2023-11-17 PROCEDURE — 85027 COMPLETE CBC AUTOMATED: CPT | Performed by: INTERNAL MEDICINE

## 2023-11-17 PROCEDURE — 99232 SBSQ HOSP IP/OBS MODERATE 35: CPT | Performed by: INTERNAL MEDICINE

## 2023-11-17 PROCEDURE — 85018 HEMOGLOBIN: CPT | Performed by: INTERNAL MEDICINE

## 2023-11-17 RX ADMIN — Medication 10 ML: at 08:48

## 2023-11-17 RX ADMIN — ASPIRIN 81 MG CHEWABLE TABLET 81 MG: 81 TABLET CHEWABLE at 08:47

## 2023-11-17 RX ADMIN — METOPROLOL SUCCINATE 100 MG: 50 TABLET, EXTENDED RELEASE ORAL at 08:47

## 2023-11-17 RX ADMIN — LEVETIRACETAM 1000 MG: 10 INJECTION, SOLUTION INTRAVENOUS at 20:47

## 2023-11-17 RX ADMIN — CLOPIDOGREL BISULFATE 75 MG: 75 TABLET ORAL at 08:47

## 2023-11-17 RX ADMIN — Medication 10 ML: at 21:26

## 2023-11-17 RX ADMIN — ATORVASTATIN CALCIUM 80 MG: 40 TABLET, FILM COATED ORAL at 20:47

## 2023-11-17 RX ADMIN — ENOXAPARIN SODIUM 40 MG: 100 INJECTION SUBCUTANEOUS at 08:47

## 2023-11-17 RX ADMIN — INSULIN DETEMIR 10 UNITS: 100 INJECTION, SOLUTION SUBCUTANEOUS at 08:47

## 2023-11-17 RX ADMIN — LEVETIRACETAM 1000 MG: 10 INJECTION, SOLUTION INTRAVENOUS at 08:47

## 2023-11-17 NOTE — PROGRESS NOTES
[facility]   Hospitalist Progress Note  Date: 2023  Patient Name: Lennie Gomez  : 1955  MRN: [id number]  Date of admission: 2023  Consultants:   -Teleneurology    Subjective   Subjective     Chief Complaint: Found down by family member    Summary:   Lennie Gomez is a 68 y.o. female with CAD with recent CABG, carotid artery stenosis with stent, anxiety/depression, sleep apnea, type 2 diabetes mellitus and history of juvenile epilepsy that presented after being found down.  Patient was found by her grandson with vomitus all around her pillow, EMS arrived and patient reportedly had another episode that looked like a tonic-clonic seizure and patient urinated on herself, she was brought to ED for further evaluation.  Eval in ED significant for patient being significantly hypertensive.  CMP showed anion gap metabolic acidosis with bicarb 18.2, anion gap of 23 and lactate of 12.6.  CT head showed indeterminate left frontal hypodensity.  CTA neck showed left internal carotid artery stent and approximately 61% stenosis of left internal carotid artery due to deformity of the stents, 65% stenosis of right internal carotid artery due to combination of calcified and noncalcified.  Patient was given a total of 4 mg of Ativan and 2000 mg of Keppra in the ED.  Hospitalist service contact for further evaluation management.  Teleneurology consulted.  Following admission patient developed fever and empiric treatment for meningitis was started.  LP performed 11/15/2023.  MRI brain obtained and no acute intracranial abnormality was noted, left T2 flair hyperintensity in the left MCA distribution.  EEG done 11/15/2023, low voltage epileptiform discharges were noted over the left parietal region.  Work-up for meningitis negative and antibiotics discontinued.  Neurology noted that patient's right-sided weakness is likely secondary to an old stroke seen as left T2 flair hyperintensity.  They also noted the  hyperdensity could be seizure changes and recommended patient have repeat MRI in 1 month after discharge.  Neurology also recommended that patient have outpatient neurosurgery consultation for right frontal AVM.    Interval Followup:   No acute issues overnight.  Patient states she continues to feel better.  Still having weakness on the right side but this is slowly improving.  She denies any active chest pain or shortness of breath.  Nursing with no additional acute issues to report.    Objective   Objective     Vitals:   Temp:  [97.7 °F (36.5 °C)-98.6 °F (37 °C)] 98.2 °F (36.8 °C)  Heart Rate:  [61-73] 64  Resp:  [16-18] 16  BP: (126-170)/(44-50) 128/49  Flow (L/min):  [1.5] 1.5  Physical Exam   Gen: No acute distress, conversant, pleasant, sitting up in bed, family at bedside  Resp: CTAB, No w/r/r, normal respiratory effort  Card: RRR, No m/r/g  Abd: Soft, Nontender, Nondistended, + bowel sounds    Result Review    Result Review:  I have personally reviewed the results as below and agree with these findings:  []  Laboratory:   CMP          11/15/2023    04:35 11/16/2023    04:43 11/17/2023    04:10   CMP   Glucose 146  167  130    BUN 11  7  8    Creatinine 0.65  0.66  0.58    EGFR 96.6  96.3  99.3    Sodium 142  138  143    Potassium 3.1  3.3  3.7    Chloride 105  104  107    Calcium 8.3  8.5  8.7    Total Protein 6.8      Albumin 3.8      Globulin 3.0      Total Bilirubin 0.3      Alkaline Phosphatase 69      AST (SGOT) 18      ALT (SGPT) 21      Albumin/Globulin Ratio 1.3      BUN/Creatinine Ratio 16.9  10.6  13.8    Anion Gap 8.3  6.9  10.5      CBC          11/15/2023    04:35 11/16/2023    04:43 11/16/2023    14:56 11/16/2023    21:54 11/17/2023    04:10   CBC   WBC 8.96  7.71    6.25    RBC 3.34  3.21    3.17    Hemoglobin 10.0  9.5  8.6  8.9  9.6    Hematocrit 32.1  31.3  29.0  29.7  31.3    MCV 96.1  97.5    98.7    MCH 29.9  29.6    30.3    MCHC 31.2  30.4    30.7    RDW 14.8  14.6    14.8    Platelets  210  176    164    Magnesium and phosphorus within normal limits  [x]  Microbiology: Blood culture (11/14/2023): No growth to date.  CSF culture (11/15/2023): No growth to date  []  Radiology:  [x]  EKG/Telemetry:    []  Cardiology/Vascular:    []  Pathology:  []  Old records:  []  Other:    Assessment & Plan   Assessment / Plan     Assessment:  Seizures  Right-sided weakness likely secondary to old stroke  History of juvenile myoclonic epilepsy  Hypokalemia, recurrent  Hypophosphatemia, improved  Chronic iron deficiency anemia  Right frontal AVM  61% stenosis of left ICA and 65% stenosis of right ICA s/p left ICA stent placement  Essential hypertension  CAD s/p CABG  Type 2 diabetes mellitus    Plan:  -Teleneurology consulted and following, appreciate assistance and recommendations in the care of this patient.  -Continue Keppra  -Management discussed with Neurology.  Continue Keppra.  Recommend repeat MRI in 1 month to further evaluate left T2 flair hyperintensities seen on imaging.  -Outpatient neurosurgery consult for right frontal AVM to be placed at discharge  -PT/OT recommending inpatient rehab.  Patient agreeable.  Social work aware.  -Continue aspirin and statin  -Continue home Plavix  -Continue home metoprolol  -Continue seizure precautions  -As needed Ativan available for seizure activity  -No change in insulin regimen  -Will monitor electrolytes and renal function with BMP, phosphorus level and magnesium level in the AM  -Will monitor WBC and Hgb with CBC in the AM  -Clinical course will dictate further management     DVT Prophylaxis: SCDs  Diet: Cardiac/Diabetic  Dispo: Rehab placement recommended.  Social work aware.  Code Status: Full code     Personally reviewed patients labs and imaging, discussed with patient and nurse at bedside. Discussed management with the following consultants: Teleneurology.     Part of this note may be an electronic transcription/translation of spoken language to printed text  using the Dragon dictation system.    DVT prophylaxis:  Medical and mechanical DVT prophylaxis orders are present.    CODE STATUS:   Level Of Support Discussed With: Patient  Code Status (Patient has no pulse and is not breathing): CPR (Attempt to Resuscitate)  Medical Interventions (Patient has pulse or is breathing): Full Support      Electronically signed by Tony Segovia MD, 11/17/23, 11:07 AM EST.

## 2023-11-17 NOTE — PLAN OF CARE
Problem: Skin Injury Risk Increased  Goal: Skin Health and Integrity  Outcome: Ongoing, Progressing   Goal Outcome Evaluation: NIH 2. Patient A/O x4

## 2023-11-18 LAB
ANION GAP SERPL CALCULATED.3IONS-SCNC: 5.6 MMOL/L (ref 5–15)
BACTERIA SPEC AEROBE CULT: NORMAL
BUN SERPL-MCNC: 8 MG/DL (ref 8–23)
BUN/CREAT SERPL: 12.7 (ref 7–25)
CALCIUM SPEC-SCNC: 8.7 MG/DL (ref 8.6–10.5)
CHLORIDE SERPL-SCNC: 106 MMOL/L (ref 98–107)
CO2 SERPL-SCNC: 29.4 MMOL/L (ref 22–29)
CREAT SERPL-MCNC: 0.63 MG/DL (ref 0.57–1)
DEPRECATED RDW RBC AUTO: 51.3 FL (ref 37–54)
EGFRCR SERPLBLD CKD-EPI 2021: 97.4 ML/MIN/1.73
ERYTHROCYTE [DISTWIDTH] IN BLOOD BY AUTOMATED COUNT: 14.6 % (ref 12.3–15.4)
GLUCOSE BLDC GLUCOMTR-MCNC: 134 MG/DL (ref 70–99)
GLUCOSE BLDC GLUCOMTR-MCNC: 231 MG/DL (ref 70–99)
GLUCOSE BLDC GLUCOMTR-MCNC: 239 MG/DL (ref 70–99)
GLUCOSE SERPL-MCNC: 137 MG/DL (ref 65–99)
GRAM STN SPEC: NORMAL
HCT VFR BLD AUTO: 29.8 % (ref 34–46.6)
HCT VFR BLD AUTO: 30.8 % (ref 34–46.6)
HCT VFR BLD AUTO: 30.8 % (ref 34–46.6)
HCT VFR BLD AUTO: 31.8 % (ref 34–46.6)
HGB BLD-MCNC: 9 G/DL (ref 12–15.9)
HGB BLD-MCNC: 9.5 G/DL (ref 12–15.9)
HGB BLD-MCNC: 9.5 G/DL (ref 12–15.9)
HGB BLD-MCNC: 9.9 G/DL (ref 12–15.9)
MAGNESIUM SERPL-MCNC: 2 MG/DL (ref 1.6–2.4)
MCH RBC QN AUTO: 29.9 PG (ref 26.6–33)
MCHC RBC AUTO-ENTMCNC: 30.8 G/DL (ref 31.5–35.7)
MCV RBC AUTO: 96.9 FL (ref 79–97)
PHOSPHATE SERPL-MCNC: 3.5 MG/DL (ref 2.5–4.5)
PLATELET # BLD AUTO: 163 10*3/MM3 (ref 140–450)
PMV BLD AUTO: 9.8 FL (ref 6–12)
POTASSIUM SERPL-SCNC: 3.8 MMOL/L (ref 3.5–5.2)
RBC # BLD AUTO: 3.18 10*6/MM3 (ref 3.77–5.28)
SODIUM SERPL-SCNC: 141 MMOL/L (ref 136–145)
WBC NRBC COR # BLD AUTO: 5.54 10*3/MM3 (ref 3.4–10.8)

## 2023-11-18 PROCEDURE — 97116 GAIT TRAINING THERAPY: CPT

## 2023-11-18 PROCEDURE — 99232 SBSQ HOSP IP/OBS MODERATE 35: CPT | Performed by: INTERNAL MEDICINE

## 2023-11-18 PROCEDURE — 85014 HEMATOCRIT: CPT | Performed by: INTERNAL MEDICINE

## 2023-11-18 PROCEDURE — 25010000002 ENOXAPARIN PER 10 MG: Performed by: INTERNAL MEDICINE

## 2023-11-18 PROCEDURE — 85027 COMPLETE CBC AUTOMATED: CPT | Performed by: INTERNAL MEDICINE

## 2023-11-18 PROCEDURE — 25010000002 LEVETIRACETAM IN NACL 0.75% 1000 MG/100ML SOLUTION: Performed by: INTERNAL MEDICINE

## 2023-11-18 PROCEDURE — 63710000001 INSULIN DETEMIR PER 5 UNITS: Performed by: INTERNAL MEDICINE

## 2023-11-18 PROCEDURE — 85018 HEMOGLOBIN: CPT | Performed by: INTERNAL MEDICINE

## 2023-11-18 PROCEDURE — 83735 ASSAY OF MAGNESIUM: CPT | Performed by: INTERNAL MEDICINE

## 2023-11-18 PROCEDURE — 63710000001 INSULIN LISPRO (HUMAN) PER 5 UNITS: Performed by: INTERNAL MEDICINE

## 2023-11-18 PROCEDURE — 82948 REAGENT STRIP/BLOOD GLUCOSE: CPT

## 2023-11-18 PROCEDURE — 80048 BASIC METABOLIC PNL TOTAL CA: CPT | Performed by: INTERNAL MEDICINE

## 2023-11-18 PROCEDURE — 84100 ASSAY OF PHOSPHORUS: CPT | Performed by: INTERNAL MEDICINE

## 2023-11-18 RX ORDER — ACETAMINOPHEN 325 MG/1
650 TABLET ORAL EVERY 6 HOURS PRN
Status: DISCONTINUED | OUTPATIENT
Start: 2023-11-18 | End: 2023-11-20 | Stop reason: HOSPADM

## 2023-11-18 RX ADMIN — CLOPIDOGREL BISULFATE 75 MG: 75 TABLET ORAL at 08:20

## 2023-11-18 RX ADMIN — METOPROLOL SUCCINATE 100 MG: 50 TABLET, EXTENDED RELEASE ORAL at 08:20

## 2023-11-18 RX ADMIN — INSULIN LISPRO 3 UNITS: 100 INJECTION, SOLUTION INTRAVENOUS; SUBCUTANEOUS at 17:23

## 2023-11-18 RX ADMIN — ENOXAPARIN SODIUM 40 MG: 100 INJECTION SUBCUTANEOUS at 08:20

## 2023-11-18 RX ADMIN — ASPIRIN 81 MG CHEWABLE TABLET 81 MG: 81 TABLET CHEWABLE at 08:20

## 2023-11-18 RX ADMIN — LEVETIRACETAM 1000 MG: 10 INJECTION, SOLUTION INTRAVENOUS at 20:50

## 2023-11-18 RX ADMIN — ATORVASTATIN CALCIUM 80 MG: 40 TABLET, FILM COATED ORAL at 20:50

## 2023-11-18 RX ADMIN — Medication 10 ML: at 20:50

## 2023-11-18 RX ADMIN — LEVETIRACETAM 1000 MG: 10 INJECTION, SOLUTION INTRAVENOUS at 08:19

## 2023-11-18 RX ADMIN — INSULIN DETEMIR 10 UNITS: 100 INJECTION, SOLUTION SUBCUTANEOUS at 08:20

## 2023-11-18 RX ADMIN — ACETAMINOPHEN 650 MG: 325 TABLET ORAL at 08:20

## 2023-11-18 RX ADMIN — Medication 10 ML: at 08:21

## 2023-11-18 RX ADMIN — INSULIN LISPRO 3 UNITS: 100 INJECTION, SOLUTION INTRAVENOUS; SUBCUTANEOUS at 11:58

## 2023-11-18 NOTE — PLAN OF CARE
Problem: Cerebral Tissue Perfusion (Stroke, Ischemic/Transient Ischemic Attack)  Goal: Optimal Cerebral Tissue Perfusion  Outcome: Ongoing, Progressing   Goal Outcome Evaluation: Patient A/O x4 NIH 0. Right arm weakness improved.

## 2023-11-18 NOTE — PLAN OF CARE
Goal Outcome Evaluation:         Patient has had no acute events during this shift. Blood glucose has been more elevated than previous days, patient ate food brought by family and was drinking soda. Patient educated on diet options vs drinking regular sprite.

## 2023-11-18 NOTE — THERAPY TREATMENT NOTE
Acute Care - Physical Therapy Treatment Note   Susan     Patient Name: Lennie Gomez  : 1955  MRN: [id number]  Today's Date: 2023      Visit Dx:     ICD-10-CM ICD-9-CM   1. Seizures  R56.9 780.39   2. Oropharyngeal dysphagia  R13.12 787.22   3. Decreased activities of daily living (ADL)  Z78.9 V49.89   4. Difficulty walking  R26.2 719.7   5. Difficulty in walking  R26.2 719.7     Patient Active Problem List   Diagnosis    Preoperative clearance    Abnormal nuclear stress test    CAD, multiple vessel    Coronary atherosclerosis due to calcified coronary lesion (CODE)    CAD (coronary artery disease)    Type 2 diabetes mellitus with hyperglycemia    Hypertension, essential    Elevated cholesterol    Diabetic polyneuropathy associated with type 2 diabetes mellitus    CVA (cerebral vascular accident)     Past Medical History:   Diagnosis Date    Arthritis     Coronary artery disease     CVA (cerebral vascular accident) 2023    Diabetes mellitus     Elevated cholesterol     Elevated cholesterol 2023    Hypertension     Myocardial infarction      Past Surgical History:   Procedure Laterality Date    CARDIAC CATHETERIZATION      CARDIAC CATHETERIZATION N/A 2023    Procedure: Left Heart Cath with possible coronary angioplasty;  Surgeon: Surendra Sprague MD;  Location: [facility] CATH INVASIVE LOCATION;  Service: Cardiology;  Laterality: N/A;     SECTION      CORONARY ARTERY BYPASS GRAFT N/A 2023    Procedure: SCOTT STERNOTOMY CORONARY ARTERY BYPASS GRAFT TIMES 4 USING LEFT INTERNAL MAMMARY ARTERY AND RIGHT GREATER SAPHENOUS VEIN GRAFT PER ENDOSCOPIC VEIN HARVESTING AND PRP;  Surgeon: Damir Aaron MD;  Location: [facility];  Service: Cardiothoracic;  Laterality: N/A;    CORONARY ARTERY BYPASS GRAFT  2023     PT Assessment (last 12 hours)       PT Evaluation and Treatment       Row Name 23 1300          Physical Therapy Time and Intention     Subjective Information no complaints  -     Document Type therapy note (daily note)  -     Mode of Treatment individual therapy;physical therapy  -     Patient Effort good  -     Symptoms Noted During/After Treatment none  -[facility] Name 11/18/23 1300          General Information    Patient Profile Reviewed yes  -     Barriers to Rehab none identified  -[facility] Name 11/18/23 1300          Bed Mobility    Bed Mobility bed mobility (all) activities  -     All Activities, [place] (Bed Mobility) standby assist  -[facility] Name 11/18/23 1300          Transfers    Transfers sit-stand transfer;stand-sit transfer  -[facility] Name 11/18/23 1300          Sit-Stand Transfer    Sit-Stand [place] (Transfers) standby assist  -       Row Name 11/18/23 1300          Stand-Sit Transfer    Stand-Sit [place] (Transfers) standby assist  -[facility] Name 11/18/23 1300          Gait/Stairs (Locomotion)    Gait/Stairs Locomotion gait/ambulation assistive device  -     [place] Level (Gait) contact guard;verbal cues  -     Assistive Device (Gait) walker, front-wheeled  -     Distance in Feet (Gait) 200  -     Pattern (Gait) step-through  -     Deviations/Abnormal Patterns (Gait) latoya decreased;gait speed decreased;stride length decreased  -[facility] Name 11/18/23 1300          Plan of Care Review    Plan of Care Reviewed With patient;daughter  -[facility] Name 11/18/23 1300          Positioning and Restraints    Pre-Treatment Position in bed  -     Post Treatment Position bed  -     In Bed call light within reach;encouraged to call for assist;with family/caregiver  -[facility] Name 11/18/23 1300          Progress Summary (PT)    Progress Toward Functional Goals (PT) progress toward functional goals is good  -     Daily Progress Summary (PT) Pt presents with decreased ambulatory function due to weakness in BLE and diminished dynamic balance. Pt will continue to  benefit from skilled physical therapy to further progress functional level and safety.  -               User Key  (r) = Recorded By, (t) = Taken By, (c) = Cosigned By      Initials Name Provider Type    Phiilp Basurto, PT Physical Therapist                      PT Recommendation and Plan     Progress Summary (PT)  Progress Toward Functional Goals (PT): progress toward functional goals is good  Daily Progress Summary (PT): Pt presents with decreased ambulatory function due to weakness in BLE and diminished dynamic balance. Pt will continue to benefit from skilled physical therapy to further progress functional level and safety.  Plan of Care Reviewed With: patient, daughter   Outcome Measures       Row Name 11/18/23 1300 11/16/23 1500 11/15/23 9584       How much help from another person do you currently need...    Turning from your back to your side while in flat bed without using bedrails? 4  - 3  -ALEM (r) ZT (t) ALEM (c) 3  -DP    Moving from lying on back to sitting on the side of a flat bed without bedrails? 4  - 3  -ALEM (r) ZT (t) ALEM (c) 2  -DP    Moving to and from a bed to a chair (including a wheelchair)? 3  - 3  -ALEM (r) ZT (t) ALEM (c) 3  -DP    Standing up from a chair using your arms (e.g., wheelchair, bedside chair)? 3  - 3  -ALEM (r) ZT (t) ALEM (c) 3  -DP    Climbing 3-5 steps with a railing? 3  - 2  -ALEM (r) ZT (t) ALEM (c) 2  -DP    To walk in hospital room? 3  - 3  -ALEM (r) ZT (t) ALEM (c) 2  -DP    AM-PAC 6 Clicks Score (PT) 20  - 17  -ALEM (r) ZT (t) 15  -DP    Highest Level of Mobility Goal 6 --> Walk 10 steps or more  - 5 --> Static standing  -ALEM (r) ZT (t) 4 --> Transfer to chair/commode  -DP       Functional Assessment    Outcome Measure Options AM-PAC 6 Clicks Basic Mobility (PT)  - -- AM-PAC 6 Clicks Basic Mobility (PT)  -DP              User Key  (r) = Recorded By, (t) = Taken By, (c) = Cosigned By      Initials Name Provider Type    Gilda Dao, PT Physical Therapist    ALEM  Raheel Kan, PT Physical Therapist    Philip Basurto, PT Physical Therapist    Brian Melendez, PT Student PT Student                     Time Calculation:    PT Charges       Row Name 11/18/23 1325             Time Calculation    PT Received On 11/18/23  -         Timed Charges    48039 - Gait Training Minutes  8  -      54692 - PT Therapeutic Activity Minutes 5  -         Total Minutes    Timed Charges Total Minutes 13  -       Total Minutes 13  -                User Key  (r) = Recorded By, (t) = Taken By, (c) = Cosigned By      Initials Name Provider Type    Philip Basurto, PT Physical Therapist                  Therapy Charges for Today       Code Description Service Date Service Provider Modifiers Qty    88117263546 HC GAIT TRAINING EA 15 MIN 11/18/2023 Philip Licona, PT GP 1            PT G-Codes  Outcome Measure Options: AM-PAC 6 Clicks Basic Mobility (PT)  AM-PAC 6 Clicks Score (PT): 20  AM-PAC 6 Clicks Score (OT): 15    Philip Licona PT  11/18/2023

## 2023-11-18 NOTE — PROGRESS NOTES
[facility]   Hospitalist Progress Note  Date: 2023  Patient Name: Lennie Gomez  : 1955  MRN: [id number]  Date of admission: 2023  Consultants:   -Teleneurology    Subjective   Subjective     Chief Complaint: Found down by family member    Summary:   Lennie Gomez is a 68 y.o. female with CAD with recent CABG, carotid artery stenosis with stent, anxiety/depression, sleep apnea, type 2 diabetes mellitus and history of juvenile epilepsy that presented after being found down.  Patient was found by her grandson with vomitus all around her pillow, EMS arrived and patient reportedly had another episode that looked like a tonic-clonic seizure and patient urinated on herself, she was brought to ED for further evaluation.  Eval in ED significant for patient being significantly hypertensive.  CMP showed anion gap metabolic acidosis with bicarb 18.2, anion gap of 23 and lactate of 12.6.  CT head showed indeterminate left frontal hypodensity.  CTA neck showed left internal carotid artery stent and approximately 61% stenosis of left internal carotid artery due to deformity of the stents, 65% stenosis of right internal carotid artery due to combination of calcified and noncalcified.  Patient was given a total of 4 mg of Ativan and 2000 mg of Keppra in the ED.  Hospitalist service contact for further evaluation management.  Teleneurology consulted.  Following admission patient developed fever and empiric treatment for meningitis was started.  LP performed 11/15/2023.  MRI brain obtained and no acute intracranial abnormality was noted, left T2 flair hyperintensity in the left MCA distribution.  EEG done 11/15/2023, low voltage epileptiform discharges were noted over the left parietal region.  Work-up for meningitis negative and antibiotics discontinued.  Neurology noted that patient's right-sided weakness is likely secondary to an old stroke seen as left T2 flair hyperintensity.  They also noted the  hyperdensity could be seizure changes and recommended patient have repeat MRI in 1 month after discharge.  Neurology also recommended that patient have outpatient neurosurgery consultation for right frontal AVM.    Interval Followup:   -- Patient showing much improvement from admission  -- Does have a headache this morning which we will treat with Tylenol  -- Social work is currently working on placement for rehabilitation patient is agreeable at this time  -- Can discontinue seizure precautions  -- Glucose control is improved and A1c is 7.4  -- Hemoglobin has down trended from 12.1->9.5 but is stable is likely due to dilution    Objective   Objective     Vitals:   Temp:  [97.2 °F (36.2 °C)-98.6 °F (37 °C)] 97.2 °F (36.2 °C)  Heart Rate:  [57-65] 57  Resp:  [18-20] 20  BP: (136-161)/(49-54) 136/52  Flow (L/min):  [1.5] 1.5  Physical Exam   Gen: No acute distress, conversant, pleasant, sitting up in bed, family at bedside  Resp: CTAB, No w/r/r, normal respiratory effort  Card: RRR, No m/r/g  Abd: Soft, Nontender, Nondistended, + bowel sounds    Result Review    Result Review:  I have personally reviewed the results as below and agree with these findings:  []  Laboratory:   CMP          11/16/2023    04:43 11/17/2023    04:10 11/18/2023    05:27   CMP   Glucose 167  130  137    BUN 7  8  8    Creatinine 0.66  0.58  0.63    EGFR 96.3  99.3  97.4    Sodium 138  143  141    Potassium 3.3  3.7  3.8    Chloride 104  107  106    Calcium 8.5  8.7  8.7    BUN/Creatinine Ratio 10.6  13.8  12.7    Anion Gap 6.9  10.5  5.6      CBC          11/16/2023    04:43 11/16/2023    14:56 11/16/2023    21:54 11/17/2023    04:10 11/17/2023    14:12 11/17/2023    21:58 11/18/2023    05:27   CBC   WBC 7.71    6.25    5.54    RBC 3.21    3.17    3.18    Hemoglobin 9.5  8.6  8.9  9.6  9.4  9.0  9.5     9.5    Hematocrit 31.3  29.0  29.7  31.3  30.6  29.2  30.8     30.8    MCV 97.5    98.7    96.9    MCH 29.6    30.3    29.9    MCHC 30.4     30.7    30.8    RDW 14.6    14.8    14.6    Platelets 176    164    163    Magnesium and phosphorus within normal limits  [x]  Microbiology: Blood culture (11/14/2023): No growth to date.  CSF culture (11/15/2023): No growth to date  []  Radiology:  [x]  EKG/Telemetry:    []  Cardiology/Vascular:    []  Pathology:  []  Old records:  []  Other:    Assessment & Plan   Assessment / Plan     Assessment:  Seizures  Right-sided weakness likely secondary to old stroke  History of juvenile myoclonic epilepsy  Hypokalemia, recurrent  Hypophosphatemia, improved  Chronic iron deficiency anemia  Right frontal AVM  61% stenosis of left ICA and 65% stenosis of right ICA s/p left ICA stent placement  Essential hypertension  CAD s/p CABG  Type 2 diabetes mellitus    Plan:  -Teleneurology consulted and following, appreciate assistance and recommendations in the care of this patient.  -Continue Keppra  -Management discussed with Neurology.  Continue Keppra.    -Teleneurology recommend repeat MRI in 1 month to further evaluate left T2 flair hyperintensities seen on imaging.  -Outpatient neurosurgery consult for right frontal AVM to be placed at discharge  -PT/OT recommending inpatient rehab.  Patient agreeable.  Social work aware.  -Continue aspirin and statin  -Continue home Plavix  -Continue home metoprolol  -Continue seizure precautions  -As needed Ativan available for seizure activity  -No change in insulin regimen  -Will monitor electrolytes and renal function with BMP, phosphorus level and magnesium level in the AM  -Will monitor WBC and Hgb with CBC in the AM  -Clinical course will dictate further management     DVT Prophylaxis: SCDs  Diet: Cardiac/Diabetic  Dispo: Rehab placement recommended.  Social work aware.  --Social work started pre-CERT on 11/17 the patient is medically ready whenever there is a bed found.      Code Status: Full code     Personally reviewed patients labs and imaging, discussed with patient and nurse at  bedside. Discussed management with the following consultants: Teleneurology.     Part of this note may be an electronic transcription/translation of spoken language to printed text using the Dragon dictation system.      CODE STATUS:   Level Of Support Discussed With: Patient  Code Status (Patient has no pulse and is not breathing): CPR (Attempt to Resuscitate)  Medical Interventions (Patient has pulse or is breathing): Full Support      Electronically signed by Tree Engel MD, 11/18/23, 8:35 AM EST.

## 2023-11-19 LAB
ALBUMIN SERPL-MCNC: 3.4 G/DL (ref 3.5–5.2)
ALBUMIN/GLOB SERPL: 1.3 G/DL
ALP SERPL-CCNC: 74 U/L (ref 39–117)
ALT SERPL W P-5'-P-CCNC: 14 U/L (ref 1–33)
ANION GAP SERPL CALCULATED.3IONS-SCNC: 7.8 MMOL/L (ref 5–15)
AST SERPL-CCNC: 15 U/L (ref 1–32)
BACTERIA SPEC AEROBE CULT: NORMAL
BACTERIA SPEC AEROBE CULT: NORMAL
BASOPHILS # BLD AUTO: 0.04 10*3/MM3 (ref 0–0.2)
BASOPHILS NFR BLD AUTO: 0.7 % (ref 0–1.5)
BILIRUB SERPL-MCNC: 0.2 MG/DL (ref 0–1.2)
BUN SERPL-MCNC: 7 MG/DL (ref 8–23)
BUN/CREAT SERPL: 9.6 (ref 7–25)
CALCIUM SPEC-SCNC: 8.9 MG/DL (ref 8.6–10.5)
CHLORIDE SERPL-SCNC: 106 MMOL/L (ref 98–107)
CO2 SERPL-SCNC: 29.2 MMOL/L (ref 22–29)
CREAT SERPL-MCNC: 0.73 MG/DL (ref 0.57–1)
DEPRECATED RDW RBC AUTO: 51.4 FL (ref 37–54)
EGFRCR SERPLBLD CKD-EPI 2021: 90.3 ML/MIN/1.73
EOSINOPHIL # BLD AUTO: 0.14 10*3/MM3 (ref 0–0.4)
EOSINOPHIL NFR BLD AUTO: 2.5 % (ref 0.3–6.2)
ERYTHROCYTE [DISTWIDTH] IN BLOOD BY AUTOMATED COUNT: 14.5 % (ref 12.3–15.4)
GLOBULIN UR ELPH-MCNC: 2.7 GM/DL
GLUCOSE BLDC GLUCOMTR-MCNC: 138 MG/DL (ref 70–99)
GLUCOSE BLDC GLUCOMTR-MCNC: 139 MG/DL (ref 70–99)
GLUCOSE BLDC GLUCOMTR-MCNC: 195 MG/DL (ref 70–99)
GLUCOSE SERPL-MCNC: 156 MG/DL (ref 65–99)
HCT VFR BLD AUTO: 30.5 % (ref 34–46.6)
HCT VFR BLD AUTO: 30.8 % (ref 34–46.6)
HCT VFR BLD AUTO: 31.9 % (ref 34–46.6)
HGB BLD-MCNC: 9.3 G/DL (ref 12–15.9)
HGB BLD-MCNC: 9.5 G/DL (ref 12–15.9)
HGB BLD-MCNC: 9.8 G/DL (ref 12–15.9)
IMM GRANULOCYTES # BLD AUTO: 0.01 10*3/MM3 (ref 0–0.05)
IMM GRANULOCYTES NFR BLD AUTO: 0.2 % (ref 0–0.5)
LYMPHOCYTES # BLD AUTO: 2.65 10*3/MM3 (ref 0.7–3.1)
LYMPHOCYTES NFR BLD AUTO: 48.2 % (ref 19.6–45.3)
MAGNESIUM SERPL-MCNC: 1.9 MG/DL (ref 1.6–2.4)
MCH RBC QN AUTO: 29.2 PG (ref 26.6–33)
MCHC RBC AUTO-ENTMCNC: 30.2 G/DL (ref 31.5–35.7)
MCV RBC AUTO: 96.9 FL (ref 79–97)
MONOCYTES # BLD AUTO: 0.34 10*3/MM3 (ref 0.1–0.9)
MONOCYTES NFR BLD AUTO: 6.2 % (ref 5–12)
NEUTROPHILS NFR BLD AUTO: 2.32 10*3/MM3 (ref 1.7–7)
NEUTROPHILS NFR BLD AUTO: 42.2 % (ref 42.7–76)
NRBC BLD AUTO-RTO: 0 /100 WBC (ref 0–0.2)
PLATELET # BLD AUTO: 172 10*3/MM3 (ref 140–450)
PMV BLD AUTO: 10.3 FL (ref 6–12)
POTASSIUM SERPL-SCNC: 3.8 MMOL/L (ref 3.5–5.2)
PROT SERPL-MCNC: 6.1 G/DL (ref 6–8.5)
RBC # BLD AUTO: 3.18 10*6/MM3 (ref 3.77–5.28)
SODIUM SERPL-SCNC: 143 MMOL/L (ref 136–145)
WBC NRBC COR # BLD AUTO: 5.5 10*3/MM3 (ref 3.4–10.8)

## 2023-11-19 PROCEDURE — 83735 ASSAY OF MAGNESIUM: CPT | Performed by: INTERNAL MEDICINE

## 2023-11-19 PROCEDURE — 25010000002 LEVETIRACETAM IN NACL 0.75% 1000 MG/100ML SOLUTION: Performed by: INTERNAL MEDICINE

## 2023-11-19 PROCEDURE — 25010000002 ENOXAPARIN PER 10 MG: Performed by: INTERNAL MEDICINE

## 2023-11-19 PROCEDURE — 99232 SBSQ HOSP IP/OBS MODERATE 35: CPT | Performed by: STUDENT IN AN ORGANIZED HEALTH CARE EDUCATION/TRAINING PROGRAM

## 2023-11-19 PROCEDURE — 80053 COMPREHEN METABOLIC PANEL: CPT | Performed by: INTERNAL MEDICINE

## 2023-11-19 PROCEDURE — 63710000001 INSULIN DETEMIR PER 5 UNITS: Performed by: INTERNAL MEDICINE

## 2023-11-19 PROCEDURE — 85018 HEMOGLOBIN: CPT | Performed by: INTERNAL MEDICINE

## 2023-11-19 PROCEDURE — 82948 REAGENT STRIP/BLOOD GLUCOSE: CPT

## 2023-11-19 PROCEDURE — 97116 GAIT TRAINING THERAPY: CPT

## 2023-11-19 PROCEDURE — 85025 COMPLETE CBC W/AUTO DIFF WBC: CPT | Performed by: INTERNAL MEDICINE

## 2023-11-19 PROCEDURE — 85014 HEMATOCRIT: CPT | Performed by: INTERNAL MEDICINE

## 2023-11-19 PROCEDURE — 63710000001 INSULIN LISPRO (HUMAN) PER 5 UNITS: Performed by: INTERNAL MEDICINE

## 2023-11-19 RX ORDER — LEVETIRACETAM 500 MG/1
1000 TABLET ORAL EVERY 12 HOURS SCHEDULED
Status: DISCONTINUED | OUTPATIENT
Start: 2023-11-19 | End: 2023-11-20 | Stop reason: HOSPADM

## 2023-11-19 RX ORDER — HYDRALAZINE HYDROCHLORIDE 25 MG/1
25 TABLET, FILM COATED ORAL ONCE
Status: COMPLETED | OUTPATIENT
Start: 2023-11-19 | End: 2023-11-19

## 2023-11-19 RX ADMIN — HYDRALAZINE HYDROCHLORIDE 25 MG: 25 TABLET, FILM COATED ORAL at 20:55

## 2023-11-19 RX ADMIN — CLOPIDOGREL BISULFATE 75 MG: 75 TABLET ORAL at 09:24

## 2023-11-19 RX ADMIN — INSULIN LISPRO 2 UNITS: 100 INJECTION, SOLUTION INTRAVENOUS; SUBCUTANEOUS at 12:33

## 2023-11-19 RX ADMIN — LEVETIRACETAM 1000 MG: 10 INJECTION, SOLUTION INTRAVENOUS at 09:25

## 2023-11-19 RX ADMIN — Medication 10 ML: at 09:25

## 2023-11-19 RX ADMIN — INSULIN DETEMIR 10 UNITS: 100 INJECTION, SOLUTION SUBCUTANEOUS at 09:25

## 2023-11-19 RX ADMIN — ASPIRIN 81 MG CHEWABLE TABLET 81 MG: 81 TABLET CHEWABLE at 09:24

## 2023-11-19 RX ADMIN — ATORVASTATIN CALCIUM 80 MG: 40 TABLET, FILM COATED ORAL at 20:55

## 2023-11-19 RX ADMIN — ENOXAPARIN SODIUM 40 MG: 100 INJECTION SUBCUTANEOUS at 09:25

## 2023-11-19 RX ADMIN — LEVETIRACETAM 1000 MG: 500 TABLET, FILM COATED ORAL at 20:55

## 2023-11-19 RX ADMIN — ACETAMINOPHEN 650 MG: 325 TABLET ORAL at 22:05

## 2023-11-19 NOTE — THERAPY TREATMENT NOTE
Acute Care - Physical Therapy Progress Note   Susan     Patient Name: Lennie Gomez  : 1955  MRN: [id number]  Today's Date: 2023      Visit Dx:     ICD-10-CM ICD-9-CM   1. Seizures  R56.9 780.39   2. Oropharyngeal dysphagia  R13.12 787.22   3. Decreased activities of daily living (ADL)  Z78.9 V49.89   4. Difficulty walking  R26.2 719.7   5. Difficulty in walking  R26.2 719.7     Patient Active Problem List   Diagnosis    Preoperative clearance    Abnormal nuclear stress test    CAD, multiple vessel    Coronary atherosclerosis due to calcified coronary lesion (CODE)    CAD (coronary artery disease)    Type 2 diabetes mellitus with hyperglycemia    Hypertension, essential    Elevated cholesterol    Diabetic polyneuropathy associated with type 2 diabetes mellitus    CVA (cerebral vascular accident)     Past Medical History:   Diagnosis Date    Arthritis     Coronary artery disease     CVA (cerebral vascular accident) 2023    Diabetes mellitus     Elevated cholesterol     Elevated cholesterol 2023    Hypertension     Myocardial infarction      Past Surgical History:   Procedure Laterality Date    CARDIAC CATHETERIZATION      CARDIAC CATHETERIZATION N/A 2023    Procedure: Left Heart Cath with possible coronary angioplasty;  Surgeon: Surendra Sprague MD;  Location: [facility] CATH INVASIVE LOCATION;  Service: Cardiology;  Laterality: N/A;     SECTION      CORONARY ARTERY BYPASS GRAFT N/A 2023    Procedure: SCOTT STERNOTOMY CORONARY ARTERY BYPASS GRAFT TIMES 4 USING LEFT INTERNAL MAMMARY ARTERY AND RIGHT GREATER SAPHENOUS VEIN GRAFT PER ENDOSCOPIC VEIN HARVESTING AND PRP;  Surgeon: Damir Aaron MD;  Location: [facility];  Service: Cardiothoracic;  Laterality: N/A;    CORONARY ARTERY BYPASS GRAFT  2023     PT Assessment (last 12 hours)       PT Evaluation and Treatment       Row Name 23 0800          Physical Therapy Time and Intention     Subjective Information no complaints  -CS     Document Type therapy note (daily note)  -CS     Mode of Treatment individual therapy;physical therapy  -CS     Patient Effort excellent  -CS     Symptoms Noted During/After Treatment none  -CS       Row Name 11/19/23 0800          Pain    Pretreatment Pain Rating 0/10 - no pain  -CS     Posttreatment Pain Rating 0/10 - no pain  -CS       Row Name 11/19/23 0800          Bed Mobility    Supine-Sit Eighty Eight (Bed Mobility) standby assist  -CS     Assistive Device (Bed Mobility) bed rails  -CS       Row Name 11/19/23 0800          Sit-Stand Transfer    Sit-Stand [place] (Transfers) contact guard  -CS     Assistive Device (Sit-Stand Transfers) --  no AD used  -CS       Row Name 11/19/23 0800          Stand-Sit Transfer    Stand-Sit Eight[place] Eight (Transfers) standby assist  -CS     Assistive Device (Stand-Sit Transfers) --  no AD used  -CS       Row Name 11/19/23 0800          Gait/Stairs (Locomotion)    Eighty Eight Level (Gait) standby assist  -CS     Assistive Device (Gait) --  no AD used  -CS     Distance in Feet (Gait) 275  -CS     Pattern (Gait) 2-point;step-through  -CS     Deviations/Abnormal Patterns (Gait) gait speed decreased  -CS       Row Name 11/19/23 0800          Vital Signs    Pretreatment Heart Rate (beats/min) 63  -CS     Intratreatment Heart Rate (beats/min) 78  -CS     Posttreatment Heart Rate (beats/min) 71  -CS     Pre Patient Position Sitting  -CS     Intra Patient Position Standing  -CS     Post Patient Position Sitting  -CS       Row Name 11/19/23 0800          Positioning and Restraints    Pre-Treatment Position in bed  -CS     Post Treatment Position chair  -CS     In Chair sitting;call light within reach;encouraged to call for assist;notified nsg  -CS       Row Name 11/19/23 0800          Progress Summary (PT)    Progress Toward Functional Goals (PT) progress toward functional goals is good  -CS               User Key  (r) = Recorded By,  (t) = Taken By, (c) = Cosigned By      Initials Name Provider Type    CS Ruslan Hernández PTA Physical Therapist Assistant                      PT Recommendation and Plan     Progress Summary (PT)  Progress Toward Functional Goals (PT): progress toward functional goals is good   Outcome Measures       Row Name 11/19/23 0800 11/18/23 1300 11/16/23 1500       How much help from another person do you currently need...    Turning from your back to your side while in flat bed without using bedrails? 4  -CS 4  -JH 3  -ALEM (r) ZT (t) ALEM (c)    Moving from lying on back to sitting on the side of a flat bed without bedrails? 4  -CS 4  -JH 3  -ALEM (r) ZT (t) ALEM (c)    Moving to and from a bed to a chair (including a wheelchair)? 4  -CS 3  -JH 3  -ALEM (r) ZT (t) ALEM (c)    Standing up from a chair using your arms (e.g., wheelchair, bedside chair)? 4  -CS 3  -JH 3  -ALEM (r) ZT (t) ALEM (c)    Climbing 3-5 steps with a railing? 3  -CS 3  -JH 2  -ALEM (r) ZT (t) ALEM (c)    To walk in hospital room? 3  -CS 3  -JH 3  -ALEM (r) ZT (t) ALEM (c)    AM-PAC 6 Clicks Score (PT) 22  -CS 20  -JH 17  -ALEM (r) ZT (t)    Highest Level of Mobility Goal 7 --> Walk 25 feet or more  -CS 6 --> Walk 10 steps or more  -JH 5 --> Static standing  -ALEM (r) ZT (t)       Functional Assessment    Outcome Measure Options AM-PAC 6 Clicks Basic Mobility (PT)  -CS AM-PAC 6 Clicks Basic Mobility (PT)  - --              User Key  (r) = Recorded By, (t) = Taken By, (c) = Cosigned By      Initials Name Provider Type    Raheel Mg, PT Physical Therapist    CS Ruslan Hernández, JA Physical Therapist Assistant    Philip Basurto, PT Physical Therapist    ZT Brian House, PT Student PT Student                     Time Calculation:    PT Charges       Row Name 11/19/23 0853             Time Calculation    Start Time 0741  -      PT Received On 11/19/23  -CS         Timed Charges    13800 - Gait Training Minutes  10  -CS      06567 - PT Therapeutic Activity Minutes  5  -CS         Total Minutes    Timed Charges Total Minutes 15  -CS       Total Minutes 15  -CS                User Key  (r) = Recorded By, (t) = Taken By, (c) = Cosigned By      Initials Name Provider Type    CS Ruslan Hernández PTA Physical Therapist Assistant                  Therapy Charges for Today       Code Description Service Date Service Provider Modifiers Qty    40954949014 HC GAIT TRAINING EA 15 MIN 11/19/2023 Ruslan Hernández PTA GP 1            PT G-Codes  Outcome Measure Options: AM-PAC 6 Clicks Basic Mobility (PT)  AM-PAC 6 Clicks Score (PT): 22  AM-PAC 6 Clicks Score (OT): 15    Ruslan Hernández PTA  11/19/2023

## 2023-11-19 NOTE — PLAN OF CARE
Goal Outcome Evaluation:            VSS.  No acute changes this shift.  No signs of distress noted at this time.

## 2023-11-19 NOTE — PROGRESS NOTES
[facility]   Hospitalist Progress Note  Date: 2023  Patient Name: Lennie Gomez  : 1955  MRN: [id number]  Date of admission: 2023  Consultants:   -Teleneurology    Subjective   Subjective     Chief Complaint: Found down by family member    Summary:   Lennie Gomez is a 68 y.o. female with CAD with recent CABG, carotid artery stenosis with stent, anxiety/depression, sleep apnea, type 2 diabetes mellitus and history of juvenile epilepsy that presented after being found down.  Patient was found by her grandson with vomitus all around her pillow, EMS arrived and patient reportedly had another episode that looked like a tonic-clonic seizure and patient urinated on herself, she was brought to ED for further evaluation.  Eval in ED significant for patient being significantly hypertensive.  CMP showed anion gap metabolic acidosis with bicarb 18.2, anion gap of 23 and lactate of 12.6.  CT head showed indeterminate left frontal hypodensity.  CTA neck showed left internal carotid artery stent and approximately 61% stenosis of left internal carotid artery due to deformity of the stents, 65% stenosis of right internal carotid artery due to combination of calcified and noncalcified.  Patient was given a total of 4 mg of Ativan and 2000 mg of Keppra in the ED.  Hospitalist service contact for further evaluation management.  Teleneurology consulted.  Following admission patient developed fever and empiric treatment for meningitis was started.  LP performed 11/15/2023.  MRI brain obtained and no acute intracranial abnormality was noted, left T2 flair hyperintensity in the left MCA distribution.  EEG done 11/15/2023, low voltage epileptiform discharges were noted over the left parietal region.  Work-up for meningitis negative and antibiotics discontinued.  Neurology noted that patient's right-sided weakness is likely secondary to an old stroke seen as left T2 flair hyperintensity.  They also noted the  hyperdensity could be seizure changes and recommended patient have repeat MRI in 1 month after discharge.  Neurology also recommended that patient have outpatient neurosurgery consultation for right frontal AVM.    Interval Followup: Awaiting placement for rehab.      Objective   Objective     Vitals:   Temp:  [97.5 °F (36.4 °C)-98.8 °F (37.1 °C)] 98.8 °F (37.1 °C)  Heart Rate:  [59-67] 62  Resp:  [20] 20  BP: (137-177)/(50-63) 177/63  Physical Exam   Constitutional: Alert, awake, no acute distress  HEENT:  PERRLA, EOMI; No Scleral icterus  Neck:  Supple; No Mass, No Lymphadenopathy  Cardiovascular:  No Rubs, No Edema, No JVD  Respiratory: No respiratory distress, unlabored breathing, saturating well on room air  Abdomen:  Normal BS all 4 Quadrants; No Guarding, No Tenderness  Musculoskeletal:  Pulses Positive all 4 Ext; No Cyanosis, No Edema  Neurological:  Alert+Ox3, Mental status WNL; No Dysarthria  Skin:  No Rash, No Cellulitis, No Erythema    Result Review    Result Review:  I have personally reviewed the results as below and agree with these findings:  []  Laboratory:   CMP          11/17/2023    04:10 11/18/2023    05:27 11/19/2023    06:06   CMP   Glucose 130  137  156    BUN 8  8  7    Creatinine 0.58  0.63  0.73    EGFR 99.3  97.4  90.3    Sodium 143  141  143    Potassium 3.7  3.8  3.8    Chloride 107  106  106    Calcium 8.7  8.7  8.9    Total Protein   6.1    Albumin   3.4    Globulin   2.7    Total Bilirubin   0.2    Alkaline Phosphatase   74    AST (SGOT)   15    ALT (SGPT)   14    Albumin/Globulin Ratio   1.3    BUN/Creatinine Ratio 13.8  12.7  9.6    Anion Gap 10.5  5.6  7.8      CBC          11/17/2023    04:10 11/17/2023    14:12 11/17/2023    21:58 11/18/2023    05:27 11/18/2023    13:46 11/18/2023    22:06 11/19/2023    06:06   CBC   WBC 6.25    5.54    5.50    RBC 3.17    3.18    3.18    Hemoglobin 9.6  9.4  9.0  9.5     9.5  9.9  9.0  9.3    Hematocrit 31.3  30.6  29.2  30.8     30.8  31.8   29.8  30.8    MCV 98.7    96.9    96.9    MCH 30.3    29.9    29.2    MCHC 30.7    30.8    30.2    RDW 14.8    14.6    14.5    Platelets 164    163    172    Magnesium and phosphorus within normal limits  [x]  Microbiology: Blood culture (11/14/2023): No growth to date.  CSF culture (11/15/2023): No growth to date  []  Radiology:  [x]  EKG/Telemetry:    []  Cardiology/Vascular:    []  Pathology:  []  Old records:  []  Other:    Assessment & Plan   Assessment / Plan     Assessment:  Seizures  Right-sided weakness likely secondary to old stroke  History of juvenile myoclonic epilepsy  Hypokalemia, recurrent  Hypophosphatemia, improved  Chronic iron deficiency anemia  Right frontal AVM  61% stenosis of left ICA and 65% stenosis of right ICA s/p left ICA stent placement  Essential hypertension  CAD s/p CABG  Type 2 diabetes mellitus    Plan:  -Teleneurology consulted and following, appreciate assistance and recommendations in the care of this patient.  -Repeat MRI in 1 month per neurology recommendations  -Neurosurgery assistance appreciated, will place outpatient referral at discharge for right frontal AVM  -Continue Keppra, will change to p.o. today  -Appreciate PT/OT assistance  -Patient currently awaiting rehab, appreciate case management assistance  -Continue aspirin and statin  -Continue home Plavix  -Continue home metoprolol  -Continue seizure precautions, no evidence of seizure activity, has as needed Ativan  -As needed Ativan available for seizure activity  -Glucose stable, continue current insulin regimen  Further recommendations pending clinical course     DVT Prophylaxis: SCDs  Diet: Cardiac/Diabetic  Dispo: Rehab placement recommended.  --Social work started pre-CERT on 11/17 the patient is medically ready whenever there is a bed found.      Code Status: Full code     Personally reviewed patients labs and imaging, discussed with patient and nurse at bedside. Discussed management with the following  consultants: Teleneurology.        CODE STATUS:   Level Of Support Discussed With: Patient  Code Status (Patient has no pulse and is not breathing): CPR (Attempt to Resuscitate)  Medical Interventions (Patient has pulse or is breathing): Full Support

## 2023-11-20 VITALS
BODY MASS INDEX: 27.87 KG/M2 | HEIGHT: 62 IN | SYSTOLIC BLOOD PRESSURE: 138 MMHG | HEART RATE: 72 BPM | DIASTOLIC BLOOD PRESSURE: 72 MMHG | WEIGHT: 151.46 LBS | OXYGEN SATURATION: 99 % | TEMPERATURE: 97.7 F | RESPIRATION RATE: 20 BRPM

## 2023-11-20 LAB
ALBUMIN SERPL-MCNC: 3.4 G/DL (ref 3.5–5.2)
ALBUMIN/GLOB SERPL: 1.3 G/DL
ALP SERPL-CCNC: 65 U/L (ref 39–117)
ALT SERPL W P-5'-P-CCNC: 14 U/L (ref 1–33)
ANION GAP SERPL CALCULATED.3IONS-SCNC: 10.6 MMOL/L (ref 5–15)
AST SERPL-CCNC: 15 U/L (ref 1–32)
BACTERIA SPEC ANAEROBE CULT: NORMAL
BASOPHILS # BLD AUTO: 0.03 10*3/MM3 (ref 0–0.2)
BASOPHILS NFR BLD AUTO: 0.6 % (ref 0–1.5)
BILIRUB SERPL-MCNC: 0.2 MG/DL (ref 0–1.2)
BUN SERPL-MCNC: 5 MG/DL (ref 8–23)
BUN/CREAT SERPL: 9.1 (ref 7–25)
CALCIUM SPEC-SCNC: 8.9 MG/DL (ref 8.6–10.5)
CHLORIDE SERPL-SCNC: 108 MMOL/L (ref 98–107)
CO2 SERPL-SCNC: 27.4 MMOL/L (ref 22–29)
CREAT SERPL-MCNC: 0.55 MG/DL (ref 0.57–1)
DEPRECATED RDW RBC AUTO: 51.9 FL (ref 37–54)
EGFRCR SERPLBLD CKD-EPI 2021: 100.6 ML/MIN/1.73
EOSINOPHIL # BLD AUTO: 0.17 10*3/MM3 (ref 0–0.4)
EOSINOPHIL NFR BLD AUTO: 3.3 % (ref 0.3–6.2)
ERYTHROCYTE [DISTWIDTH] IN BLOOD BY AUTOMATED COUNT: 14.6 % (ref 12.3–15.4)
GLOBULIN UR ELPH-MCNC: 2.6 GM/DL
GLUCOSE BLDC GLUCOMTR-MCNC: 130 MG/DL (ref 70–99)
GLUCOSE BLDC GLUCOMTR-MCNC: 166 MG/DL (ref 70–99)
GLUCOSE SERPL-MCNC: 130 MG/DL (ref 65–99)
HCT VFR BLD AUTO: 31.6 % (ref 34–46.6)
HCT VFR BLD AUTO: 32.4 % (ref 34–46.6)
HGB BLD-MCNC: 10.1 G/DL (ref 12–15.9)
HGB BLD-MCNC: 9.9 G/DL (ref 12–15.9)
IMM GRANULOCYTES # BLD AUTO: 0.01 10*3/MM3 (ref 0–0.05)
IMM GRANULOCYTES NFR BLD AUTO: 0.2 % (ref 0–0.5)
LYMPHOCYTES # BLD AUTO: 2.75 10*3/MM3 (ref 0.7–3.1)
LYMPHOCYTES NFR BLD AUTO: 53 % (ref 19.6–45.3)
MAGNESIUM SERPL-MCNC: 2.2 MG/DL (ref 1.6–2.4)
MCH RBC QN AUTO: 30.5 PG (ref 26.6–33)
MCHC RBC AUTO-ENTMCNC: 31.3 G/DL (ref 31.5–35.7)
MCV RBC AUTO: 97.2 FL (ref 79–97)
MONOCYTES # BLD AUTO: 0.39 10*3/MM3 (ref 0.1–0.9)
MONOCYTES NFR BLD AUTO: 7.5 % (ref 5–12)
NEUTROPHILS NFR BLD AUTO: 1.84 10*3/MM3 (ref 1.7–7)
NEUTROPHILS NFR BLD AUTO: 35.4 % (ref 42.7–76)
NRBC BLD AUTO-RTO: 0 /100 WBC (ref 0–0.2)
PHOSPHATE SERPL-MCNC: 3.7 MG/DL (ref 2.5–4.5)
PLATELET # BLD AUTO: 184 10*3/MM3 (ref 140–450)
PMV BLD AUTO: 10.3 FL (ref 6–12)
POTASSIUM SERPL-SCNC: 3.3 MMOL/L (ref 3.5–5.2)
PROT SERPL-MCNC: 6 G/DL (ref 6–8.5)
RBC # BLD AUTO: 3.25 10*6/MM3 (ref 3.77–5.28)
SODIUM SERPL-SCNC: 146 MMOL/L (ref 136–145)
WBC NRBC COR # BLD AUTO: 5.19 10*3/MM3 (ref 3.4–10.8)

## 2023-11-20 PROCEDURE — 85018 HEMOGLOBIN: CPT | Performed by: INTERNAL MEDICINE

## 2023-11-20 PROCEDURE — 82948 REAGENT STRIP/BLOOD GLUCOSE: CPT

## 2023-11-20 PROCEDURE — 99239 HOSP IP/OBS DSCHRG MGMT >30: CPT | Performed by: STUDENT IN AN ORGANIZED HEALTH CARE EDUCATION/TRAINING PROGRAM

## 2023-11-20 PROCEDURE — 80053 COMPREHEN METABOLIC PANEL: CPT | Performed by: STUDENT IN AN ORGANIZED HEALTH CARE EDUCATION/TRAINING PROGRAM

## 2023-11-20 PROCEDURE — 83735 ASSAY OF MAGNESIUM: CPT | Performed by: STUDENT IN AN ORGANIZED HEALTH CARE EDUCATION/TRAINING PROGRAM

## 2023-11-20 PROCEDURE — 63710000001 INSULIN LISPRO (HUMAN) PER 5 UNITS: Performed by: INTERNAL MEDICINE

## 2023-11-20 PROCEDURE — 85014 HEMATOCRIT: CPT | Performed by: INTERNAL MEDICINE

## 2023-11-20 PROCEDURE — 63710000001 INSULIN DETEMIR PER 5 UNITS: Performed by: INTERNAL MEDICINE

## 2023-11-20 PROCEDURE — 85025 COMPLETE CBC W/AUTO DIFF WBC: CPT | Performed by: STUDENT IN AN ORGANIZED HEALTH CARE EDUCATION/TRAINING PROGRAM

## 2023-11-20 PROCEDURE — 25010000002 ENOXAPARIN PER 10 MG: Performed by: INTERNAL MEDICINE

## 2023-11-20 PROCEDURE — 84100 ASSAY OF PHOSPHORUS: CPT | Performed by: STUDENT IN AN ORGANIZED HEALTH CARE EDUCATION/TRAINING PROGRAM

## 2023-11-20 RX ORDER — LOSARTAN POTASSIUM 50 MG/1
100 TABLET ORAL DAILY
Status: DISCONTINUED | OUTPATIENT
Start: 2023-11-20 | End: 2023-11-20 | Stop reason: HOSPADM

## 2023-11-20 RX ORDER — ATORVASTATIN CALCIUM 80 MG/1
80 TABLET, FILM COATED ORAL NIGHTLY
Qty: 30 TABLET | Refills: 0 | Status: SHIPPED | OUTPATIENT
Start: 2023-11-20 | End: 2023-12-20

## 2023-11-20 RX ORDER — LEVETIRACETAM 1000 MG/1
1000 TABLET ORAL EVERY 12 HOURS SCHEDULED
Qty: 60 TABLET | Refills: 0 | Status: SHIPPED | OUTPATIENT
Start: 2023-11-20 | End: 2023-12-20

## 2023-11-20 RX ORDER — POTASSIUM CHLORIDE 1.5 G/1.58G
40 POWDER, FOR SOLUTION ORAL ONCE
Status: DISCONTINUED | OUTPATIENT
Start: 2023-11-20 | End: 2023-11-20

## 2023-11-20 RX ORDER — POTASSIUM CHLORIDE 750 MG/1
40 CAPSULE, EXTENDED RELEASE ORAL ONCE
Status: COMPLETED | OUTPATIENT
Start: 2023-11-20 | End: 2023-11-20

## 2023-11-20 RX ADMIN — CLOPIDOGREL BISULFATE 75 MG: 75 TABLET ORAL at 09:07

## 2023-11-20 RX ADMIN — POTASSIUM CHLORIDE 40 MEQ: 750 CAPSULE, EXTENDED RELEASE ORAL at 12:48

## 2023-11-20 RX ADMIN — ENOXAPARIN SODIUM 40 MG: 100 INJECTION SUBCUTANEOUS at 09:08

## 2023-11-20 RX ADMIN — ASPIRIN 81 MG CHEWABLE TABLET 81 MG: 81 TABLET CHEWABLE at 09:07

## 2023-11-20 RX ADMIN — INSULIN DETEMIR 10 UNITS: 100 INJECTION, SOLUTION SUBCUTANEOUS at 09:07

## 2023-11-20 RX ADMIN — LOSARTAN POTASSIUM 100 MG: 50 TABLET, FILM COATED ORAL at 11:53

## 2023-11-20 RX ADMIN — LEVETIRACETAM 1000 MG: 500 TABLET, FILM COATED ORAL at 09:52

## 2023-11-20 RX ADMIN — METOPROLOL SUCCINATE 100 MG: 50 TABLET, EXTENDED RELEASE ORAL at 09:07

## 2023-11-20 RX ADMIN — INSULIN LISPRO 2 UNITS: 100 INJECTION, SOLUTION INTRAVENOUS; SUBCUTANEOUS at 12:48

## 2023-11-20 NOTE — PLAN OF CARE
Goal Outcome Evaluation:           Progress: no change  Outcome Evaluation: SBP elevated >180, medicated x1 per PA order, otherwise VSS. No acute changes. NIH 0.

## 2023-11-20 NOTE — DISCHARGE SUMMARY
[facility]         HOSPITALIST  DISCHARGE SUMMARY    Patient Name: Lennie Gomez  : 1955  MRN: [id number]    Date of Admission: 2023  Date of Discharge:  2023  Primary Care Physician: Jacqui Patterson APRN    Consults       Date and Time Order Name Status Description    2023  5:48 PM Inpatient Neurology Consult Stroke      2023  2:15 PM Inpatient Neurology Consult General      2023  1:57 PM Inpatient Hospitalist Consult              Active and Resolved Hospital Problems:  Active Hospital Problems    Diagnosis POA   • **CVA (cerebral vascular accident) [I63.9] Yes      Resolved Hospital Problems   No resolved problems to display.       Hospital Course     Hospital Course:    Lennie Gomez is a 68 y.o. female  with CAD with recent CABG, carotid artery stenosis with stent, anxiety/depression, sleep apnea, type 2 diabetes mellitus and history of juvenile epilepsy that presented after being found down.  Patient was found by her grandson with vomitus all around her pillow, EMS arrived and patient reportedly had another episode that looked like a tonic-clonic seizure and patient urinated on herself, she was brought to ED for further evaluation.  Eval in ED significant for patient being significantly hypertensive.  CMP showed anion gap metabolic acidosis with bicarb 18.2, anion gap of 23 and lactate of 12.6.  CT head showed indeterminate left frontal hypodensity.  CTA neck showed left internal carotid artery stent and approximately 61% stenosis of left internal carotid artery due to deformity of the stents, 65% stenosis of right internal carotid artery due to combination of calcified and noncalcified.  Patient was given a total of 4 mg of Ativan and 2000 mg of Keppra in the ED.  Hospitalist service contact for further evaluation management.  Teleneurology consulted.  Following admission patient developed fever and empiric treatment for meningitis was started.   LP performed 11/15/2023.  MRI brain obtained and no acute intracranial abnormality was noted, left T2 flair hyperintensity in the left MCA distribution.  EEG done 11/15/2023, low voltage epileptiform discharges were noted over the left parietal region.  Work-up for meningitis negative and antibiotics discontinued.  Neurology noted that patient's right-sided weakness is likely secondary to an old stroke seen as left T2 flair hyperintensity.  They also noted the hyperdensity could be seizure changes and recommended patient have repeat MRI in 1 month after discharge.  Neurology also recommended that patient have outpatient neurosurgery consultation for right frontal AVM.     Patient is planned to be discharged to rehab today.  Patient was found to be hypertensive overnight for which her home dose of losartan 100 mg daily was restarted.  Repeat blood pressure after few hours was 138/72 after which patient was planned to be discharged to rehab.  Patient will continue to take all her home medication.  Her atorvastatin dose has been increased to 80 mg daily.  She has been started on Keppra as per neurology.  Plan was discussed with the patient and her daughter at bedside.  Patient is clinically and hemodynamically stable at the time of discharge.  She will follow-up with PCP in 3-7 days, will need her CBC and chemistry trended during the follow-up visit.  Patient will also follow-up with neurology as outpatient, repeat MRI in 1 month after discharge.  Also outpatient neurosurgery consultation for right frontal AVM.  Advised not to drive and use heavy machinery for at least 3 months until cleared by neurology/neurosurgery.        DISCHARGE Follow Up Recommendations for labs and diagnostics:   As mentioned above.    Day of Discharge     Vital Signs:  Temp:  [97.7 °F (36.5 °C)-98.6 °F (37 °C)] 97.7 °F (36.5 °C)  Heart Rate:  [59-91] 72  Resp:  [16-20] 20  BP: (138-188)/(42-72) 138/72  Physical Exam:   Constitutional: Alert,  awake, no acute distress  HEENT:  PERRLA, EOMI; No Scleral icterus  Neck:  Supple; No Mass, No Lymphadenopathy  Cardiovascular:  No Rubs, No Edema, No JVD  Respiratory: No respiratory distress, unlabored breathing, saturating well on room air  Abdomen:  Normal BS all 4 Quadrants; No Guarding, No Tenderness  Musculoskeletal:  Pulses Positive all 4 Ext; No Cyanosis, No Edema  Neurological:  Alert+Ox3, Mental status WNL; No Dysarthria  Skin:  No Rash, No Cellulitis, No Erythema    Discharge Details        Discharge Medications        New Medications        Instructions Start Date   levETIRAcetam 1000 MG tablet  Commonly known as: KEPPRA   1,000 mg, Oral, Every 12 Hours Scheduled             Changes to Medications        Instructions Start Date   atorvastatin 80 MG tablet  Commonly known as: LIPITOR  What changed:   medication strength  how much to take   80 mg, Oral, Nightly             Continue These Medications        Instructions Start Date   Aspirin Low Dose 81 MG EC tablet  Generic drug: aspirin   81 mg, Oral, Daily      bumetanide 2 MG tablet  Commonly known as: BUMEX   2 mg, Oral, Daily      ergocalciferol 1.25 MG (07358 UT) capsule  Commonly known as: ERGOCALCIFEROL   50,000 Units, Oral, Weekly      Lantus SoloStar 100 UNIT/ML injection pen  Generic drug: Insulin Glargine   Inject 20 Units under the skin into the appropriate area as directed Daily.      losartan 100 MG tablet  Commonly known as: COZAAR   100 mg, Oral, Daily      metFORMIN 1000 MG tablet  Commonly known as: GLUCOPHAGE   1,000 mg, Oral, 2 Times Daily With Meals      metoprolol succinate  MG 24 hr tablet  Commonly known as: TOPROL-XL   100 mg, Oral, Daily      sertraline 25 MG tablet  Commonly known as: ZOLOFT   25 mg, Oral, Daily             ASK your doctor about these medications        Instructions Start Date   clopidogrel 75 MG tablet  Commonly known as: PLAVIX   75 mg, Oral, Daily      potassium chloride ER 20 MEQ tablet  controlled-release ER tablet  Commonly known as: K-TAB   20 mEq, Oral, Daily      venlafaxine 75 MG tablet  Commonly known as: EFFEXOR   75 mg, Oral, Daily               Allergies   Allergen Reactions   • Theophylline Hives       Discharge Disposition:  Rehab Facility or Unit (DC - External)    Diet:  Hospital:  Diet Order   Procedures   • Diet: Cardiac Diets, Diabetic Diets; Healthy Heart (2-3 Na+); Consistent Carbohydrate; Texture: Soft to Chew (NDD 3); Soft to Chew: Whole Meat; Fluid Consistency: Thin (IDDSI 0)       Discharge Activity:       CODE STATUS:  Code Status and Medical Interventions:   Ordered at: 11/14/23 1614     Level Of Support Discussed With:    Patient     Code Status (Patient has no pulse and is not breathing):    CPR (Attempt to Resuscitate)     Medical Interventions (Patient has pulse or is breathing):    Full Support       No future appointments.    Additional Instructions for the Follow-ups that You Need to Schedule       Discharge Follow-up with PCP   As directed       Currently Documented PCP:    Jacqui Patterson APRN    PCP Phone Number:    [phone]     Follow Up Details: 3-7 days; needs CBC and AMP trended during follow up. Outpatient Neurosurgery consult for AVM.        Discharge Follow-up with Specialty: Neurology; 1 Month   As directed      Specialty: Neurology   Follow Up: 1 Month   Follow Up Details: Needs follow up MRI in 1 month after discharge                Pertinent  and/or Most Recent Results     PROCEDURES:   N/A    LAB RESULTS:      Lab 11/20/23  1347 11/20/23  0420 11/19/23  2156 11/19/23  1359 11/19/23  0606 11/18/23  1346 11/18/23  0527 11/17/23  1412 11/17/23  0410 11/16/23  1456 11/16/23  0443 11/15/23  0810 11/15/23  0435 11/14/23  2309 11/14/23  1856 11/14/23  1351 11/14/23  1033   WBC  --  5.19  --   --  5.50  --  5.54  --  6.25  --  7.71  --  8.96  --   --   --  10.20   HEMOGLOBIN 10.1* 9.9* 9.8* 9.5* 9.3*   < > 9.5*  9.5*   < > 9.6*   < > 9.5*  --  10.0*   --   --   --  12.1   HEMATOCRIT 32.4* 31.6* 31.9* 30.5* 30.8*   < > 30.8*  30.8*   < > 31.3*   < > 31.3*  --  32.1*  --   --   --  40.3   PLATELETS  --  184  --   --  172  --  163  --  164  --  176  --  210  --   --   --  316   NEUTROS ABS  --  1.84  --   --  2.32  --   --   --   --   --   --   --  4.79  --   --   --  7.48*   IMMATURE GRANS (ABS)  --  0.01  --   --  0.01  --   --   --   --   --   --   --  0.03  --   --   --  0.06*   LYMPHS ABS  --  2.75  --   --  2.65  --   --   --   --   --   --   --  3.26*  --   --   --  2.20   MONOS ABS  --  0.39  --   --  0.34  --   --   --   --   --   --   --  0.83  --   --   --  0.38   EOS ABS  --  0.17  --   --  0.14  --   --   --   --   --   --   --  0.01  --   --   --  0.02   MCV  --  97.2*  --   --  96.9  --  96.9  --  98.7*  --  97.5*  --  96.1  --   --   --  100.0*   PROCALCITONIN  --   --   --   --   --   --   --   --   --   --   --   --  0.07  --   --   --  0.05   LACTATE  --   --   --   --   --   --   --   --   --   --   --   --   --  1.6 2.8* 3.9* 12.6*   PROTIME  --   --   --   --   --   --   --   --   --   --   --  14.7  --   --   --   --  14.6   APTT  --   --   --   --   --   --   --   --   --   --   --  28.3  --   --   --   --  21.9*    < > = values in this interval not displayed.         Lab 11/20/23  0420 11/19/23  0606 11/18/23  0527 11/17/23  0410 11/16/23  0443 11/15/23  0435   SODIUM 146* 143 141 143 138 142   POTASSIUM 3.3* 3.8 3.8 3.7 3.3* 3.1*   CHLORIDE 108* 106 106 107 104 105   CO2 27.4 29.2* 29.4* 25.5 27.1 28.7   ANION GAP 10.6 7.8 5.6 10.5 6.9 8.3   BUN 5* 7* 8 8 7* 11   CREATININE 0.55* 0.73 0.63 0.58 0.66 0.65   EGFR 100.6 90.3 97.4 99.3 96.3 96.6   GLUCOSE 130* 156* 137* 130* 167* 146*   CALCIUM 8.9 8.9 8.7 8.7 8.5* 8.3*   MAGNESIUM 2.2 1.9 2.0 2.0 2.0 1.9   PHOSPHORUS 3.7  --  3.5 2.8 2.0*  --    HEMOGLOBIN A1C  --   --   --   --   --  7.40*         Lab 11/20/23  0420 11/19/23  0606 11/15/23  0435 11/14/23  1033   TOTAL PROTEIN 6.0 6.1 6.8 8.2    ALBUMIN 3.4* 3.4* 3.8 4.8   GLOBULIN 2.6 2.7 3.0 3.4   ALT (SGPT) 14 14 21 34*   AST (SGOT) 15 15 18 37*   BILIRUBIN 0.2 0.2 0.3 0.3   ALK PHOS 65 74 69 114         Lab 11/15/23  0810 11/14/23  1033   HSTROP T  --  18*   PROTIME 14.7 14.6   INR 1.12 1.11         Lab 11/15/23  0435   CHOLESTEROL 90   LDL CHOL 30   HDL CHOL 40   TRIGLYCERIDES 105         Lab 11/16/23  0443   IRON 38   IRON SATURATION (TSAT) 13*   TIBC 286*   TRANSFERRIN 192*         Lab 11/14/23  1738   PH, ARTERIAL 7.397   PCO2, ARTERIAL 42.5   PO2 .8*   O2 SATURATION ART 97.6   FIO2 36   HCO3 ART 25.6   BASE EXCESS ART 0.6   CARBOXYHEMOGLOBIN 0.3     Brief Urine Lab Results  (Last result in the past 365 days)        Color   Clarity   Blood   Leuk Est   Nitrite   Protein   CREAT   Urine HCG        11/14/23 1152 Yellow   Clear   Negative   Negative   Negative   Negative                 Microbiology Results (last 10 days)       Procedure Component Value - Date/Time    Culture, CSF - Cerebrospinal Fluid, Lumbar Puncture [[id number]] Collected: 11/15/23 1154    Lab Status: Final result Specimen: Cerebrospinal Fluid from Lumbar Puncture Updated: 11/18/23 0808     CSF Culture No growth at 3 days     Gram Stain No organisms seen    Anaerobic Culture - Cerebrospinal Fluid, Spine, Lumbar [[id number]]  (Normal) Collected: 11/15/23 1154    Lab Status: Final result Specimen: Cerebrospinal Fluid from Spine, Lumbar Updated: 11/20/23 0640     Anaerobic Culture No anaerobes isolated at 5 days    AFB Culture - Cerebrospinal Fluid, Lumbar Puncture [[id number]] Collected: 11/15/23 1154    Lab Status: Preliminary result Specimen: Cerebrospinal Fluid from Lumbar Puncture Updated: 11/20/23 1201     AFB Culture No AFB isolated at less than 1 week     AFB Stain No acid fast bacilli seen    Cryptococcal AG, CSF - Cerebrospinal Fluid, Lumbar Puncture [[id number]]  (Normal) Collected: 11/15/23 1154    Lab Status: Final result Specimen: Cerebrospinal Fluid from Lumbar  Puncture Updated: 11/15/23 1225     Cryptococcal Antigen, CSF Negative    Meningitis / Encephalitis Panel, PCR - Cerebrospinal Fluid, Lumbar Puncture [[id number]]  (Normal) Collected: 11/15/23 1154    Lab Status: Final result Specimen: Cerebrospinal Fluid from Lumbar Puncture Updated: 11/15/23 1901     ESCHERICHIA COLI K1, PCR Not Detected     HAEMOPHILUS INFLUENZAE, PCR Not Detected     LISTERIA MONOCYTOGENES, PCR Not Detected     NEISSERIA MENINGITIDIS, PCR Not Detected     STREPTOCOCCUS AGALACTIAE, PCR Not Detected     STREPTOCOCCUS PNEUMONIAE, PCR Not Detected     CYTOMEGALOVIRUS (CMV), PCR Not Detected     ENTEROVIRUS, PCR Not Detected     HERPES SIMPLEX VIRUS 1 (HSV-1), PCR Not Detected     HERPES SIMPLEX VIRUS 2 (HSV-2), PCR Not Detected     HUMAN PARECHOVIRUS, PCR Not Detected     VARICELLA ZOSTER VIRUS (VZV), PCR Not Detected     CRYPTOCOCCUS NEOFORMANS / GATTII, PCR Not Detected     HUMAN HERPES VIRUS 6 PCR Not Detected    COVID-19, FLU A/B, RSV PCR 1 HR TAT - Swab, Nasopharynx [[id number]]  (Normal) Collected: 11/14/23 1803    Lab Status: Final result Specimen: Swab from Nasopharynx Updated: 11/14/23 1853     COVID19 Not Detected     Influenza A PCR Not Detected     Influenza B PCR Not Detected     RSV, PCR Not Detected    Narrative:      Fact sheet for providers: https://www.fda.gov/media/733004/download    Fact sheet for patients: https://www.fda.gov/media/695479/download    Test performed by PCR.    S. Pneumo Ag Urine or CSF - Urine, Urine, Clean Catch [[id number]]  (Normal) Collected: 11/14/23 1152    Lab Status: Final result Specimen: Urine, Clean Catch Updated: 11/14/23 1948     Strep Pneumo Ag Negative    Legionella Antigen, Urine - Urine, Urine, Clean Catch [[id number]]  (Normal) Collected: 11/14/23 1152    Lab Status: Final result Specimen: Urine, Clean Catch Updated: 11/14/23 1947     LEGIONELLA ANTIGEN, URINE Negative    Blood Culture - Blood, Arm, Left [[id number]]  (Normal) Collected: 11/14/23  1036    Lab Status: Final result Specimen: Blood from Arm, Left Updated: 11/19/23 1102     Blood Culture No growth at 5 days    Blood Culture - Blood, Arm, Right [[id number]]  (Normal) Collected: 11/14/23 1033    Lab Status: Final result Specimen: Blood from Arm, Right Updated: 11/19/23 1046     Blood Culture No growth at 5 days            IR LUMBAR PUNCTURE DIAGNOSTIC    Result Date: 11/15/2023   Fluoroscopic guided lumbar puncture without evidence of complication, patient tolerated procedure.  The patient was transferred back to the hospital room status post procedure    RANDI GRIER       Electronically Signed and Approved By: Kain Bell M.D. on 11/15/2023 at 15:37             MRI Brain With & Without Contrast    Result Date: 11/14/2023    1. There are findings involving the right frontal lobe compatible with underlying vascular malformation. 2. T2 signal changes involving the left cerebral hemisphere that could relate to old insult 3. Old lacunar-type infarction within the thalamic areas 4. T2 signal changes right parietal lobe that could relate to more chronic small vessel ischemic change.      BOBBI HYLTON MD       Electronically Signed and Approved By: BOBBI HYLTON MD on 11/14/2023 at 17:57             CT Angiogram Neck    Result Date: 11/14/2023    1. Interval placement of left internal carotid artery stent.  There is approximately 61% stenosis of the left internal carotid artery due to deformity of the stent.  2.  65% stenosis of the right internal carotid artery due to a combination of calcified noncalcified plaque.  3. Patent vertebral arteries without evidence of focal stenosis.      RENNY LOCKETT MD       Electronically Signed and Approved By: RENNY LOCKETT MD on 11/14/2023 at 13:11             CT Angiogram Head    Result Date: 11/14/2023    1. No significant change in right frontal lobe arterial venous malformation.  No evidence of hemorrhage. 2. No intracranial vascular stenosis, occlusion, or  aneurysm. 3. No pathologic contrast enhancement     RENNY LOCKETT MD       Electronically Signed and Approved By: RENNY LOCKETT MD on 11/14/2023 at 12:55             XR Chest 1 View    Result Date: 11/14/2023    No active pulmonary process.       INDIA MUIR MD       Electronically Signed and Approved By: INDIA MUIR MD on 11/14/2023 at 12:20             CT CEREBRAL PERFUSION WITH & WITHOUT CONTRAST    Result Date: 11/14/2023   Normal examination.       NHAN DAUGHERTY MD       Electronically Signed and Approved By: NHAN DAUGHERTY MD on 11/14/2023 at 11:39             CT Head Without Contrast    Result Date: 11/14/2023    1. No acute intracranial abnormality 2. Generalized parenchymal volume loss with areas of encephalomalacia involving the left frontal and parietal lobes 3. Multiple calcifications within the right frontal lobe related to known AVM.      RENNY LOCKETT MD       Electronically Signed and Approved By: RENNY LOCKETT MD on 11/14/2023 at 11:12               Results for orders placed during the hospital encounter of 08/31/23    Duplex Vein Mapping Lower Extremity - Bilateral CAR    Interpretation Summary  •  The right great saphenous vein is patent  and of adequate size in the thigh.  •  The right great saphenous vein is patent and of adequate size in the calf.  •  The left great saphenous vein is patent and of adequate size in the thigh.  •  The left great saphenous vein is patent  and of adequate size in the calf.      Results for orders placed during the hospital encounter of 08/31/23    Duplex Vein Mapping Lower Extremity - Bilateral CAR    Interpretation Summary  •  The right great saphenous vein is patent  and of adequate size in the thigh.  •  The right great saphenous vein is patent and of adequate size in the calf.  •  The left great saphenous vein is patent and of adequate size in the thigh.  •  The left great saphenous vein is patent  and of adequate size in the calf.      Results  for orders placed during the hospital encounter of 11/14/23    Adult Transthoracic Echo Complete W/ Cont if Necessary Per Protocol (With Agitated Saline)    Interpretation Summary  Mild left ventricular hypertrophy with normal left ventricular systolic function.  Mild aortic stenosis.  Trace MR and trace TR.      Labs Pending at Discharge:  Pending Labs       Order Current Status    AFB Culture - Cerebrospinal Fluid, Lumbar Puncture Preliminary result              Time spent on Discharge including face to face service:  35 minutes    Electronically signed by Josue Wang MD, 11/20/23, 2:18 PM EST.

## 2023-11-20 NOTE — CASE MANAGEMENT/SOCIAL WORK
Patient will discharge to [facility] in [place].      [address] [id number]  [place], [place]  Ph# [phone]

## 2023-11-22 LAB
MYCOBACTERIUM SPEC CULT: NORMAL
NIGHT BLUE STAIN TISS: NORMAL

## 2023-11-29 LAB
MYCOBACTERIUM SPEC CULT: NORMAL
NIGHT BLUE STAIN TISS: NORMAL

## 2023-12-06 LAB
MYCOBACTERIUM SPEC CULT: NORMAL
NIGHT BLUE STAIN TISS: NORMAL

## 2023-12-13 LAB
MYCOBACTERIUM SPEC CULT: NORMAL
NIGHT BLUE STAIN TISS: NORMAL

## 2023-12-20 LAB
MYCOBACTERIUM SPEC CULT: NORMAL
NIGHT BLUE STAIN TISS: NORMAL

## 2023-12-27 LAB
MYCOBACTERIUM SPEC CULT: NORMAL
NIGHT BLUE STAIN TISS: NORMAL

## 2024-02-26 ENCOUNTER — OFFICE VISIT (OUTPATIENT)
Dept: CARDIOLOGY | Facility: CLINIC | Age: 69
End: 2024-02-26
Payer: MEDICARE

## 2024-02-26 VITALS
HEART RATE: 71 BPM | HEIGHT: 62 IN | BODY MASS INDEX: 29.08 KG/M2 | DIASTOLIC BLOOD PRESSURE: 70 MMHG | WEIGHT: 158 LBS | SYSTOLIC BLOOD PRESSURE: 127 MMHG

## 2024-02-26 DIAGNOSIS — I25.10 CAD, MULTIPLE VESSEL: Primary | ICD-10-CM

## 2024-02-26 DIAGNOSIS — I10 HYPERTENSION, ESSENTIAL: ICD-10-CM

## 2024-02-26 DIAGNOSIS — E78.00 ELEVATED CHOLESTEROL: ICD-10-CM

## 2024-02-26 PROCEDURE — 3078F DIAST BP <80 MM HG: CPT | Performed by: INTERNAL MEDICINE

## 2024-02-26 PROCEDURE — 99214 OFFICE O/P EST MOD 30 MIN: CPT | Performed by: INTERNAL MEDICINE

## 2024-02-26 PROCEDURE — 3074F SYST BP LT 130 MM HG: CPT | Performed by: INTERNAL MEDICINE

## 2024-02-26 RX ORDER — ATORVASTATIN CALCIUM 80 MG/1
80 TABLET, FILM COATED ORAL DAILY
COMMUNITY

## 2024-02-26 NOTE — PROGRESS NOTES
Chief Complaint  Hypertension    Subjective        Lennie Gomez presents to [facility] CARDIOLOGY  History of present illness:    Patient states overall she is doing well.  Since seeing us last she did undergo four-vessel bypass 2024 with LIMA to LAD, saphenous vein graft to PDA, saphenous vein graft OM 3 and saphenous vein graft to diagonal.  She also had stenting of the left ICA.  She has a difficult time driving so does not want to do cardiac rehab but wants to start working out with her daughter at [employer].  She notes no bleeding problems.  She was recently in the hospital and diagnosed with a seizure.      Past Medical History:   Diagnosis Date    Arthritis     Coronary artery disease     CVA (cerebral vascular accident) 2023    Diabetes mellitus     Elevated cholesterol     Elevated cholesterol 2023    Hypertension     Myocardial infarction          Past Surgical History:   Procedure Laterality Date    CARDIAC CATHETERIZATION      CARDIAC CATHETERIZATION N/A 2023    Procedure: Left Heart Cath with possible coronary angioplasty;  Surgeon: Surendra Sprague MD;  Location: [facility] CATH INVASIVE LOCATION;  Service: Cardiology;  Laterality: N/A;     SECTION      CORONARY ARTERY BYPASS GRAFT N/A 2023    Procedure: SCOTT STERNOTOMY CORONARY ARTERY BYPASS GRAFT TIMES 4 USING LEFT INTERNAL MAMMARY ARTERY AND RIGHT GREATER SAPHENOUS VEIN GRAFT PER ENDOSCOPIC VEIN HARVESTING AND PRP;  Surgeon: Damir Aaron MD;  Location: [facility];  Service: Cardiothoracic;  Laterality: N/A;    CORONARY ARTERY BYPASS GRAFT  2023          Social History     Socioeconomic History    Marital status:    Tobacco Use    Smoking status: Former     Packs/day: 0.50     Years: 50.00     Additional pack years: 0.00     Total pack years: 25.00     Types: Cigarettes    Smokeless tobacco: Never   Vaping Use    Vaping Use: Never used   Substance and Sexual  "Activity    Alcohol use: Never    Drug use: Never    Sexual activity: Not Currently     Partners: Male     Birth control/protection: Tubal ligation         Family History   Problem Relation Age of Onset    Heart attack Mother           Allergies   Allergen Reactions    Theophylline Hives            Current Outpatient Medications:     atorvastatin (LIPITOR) 80 MG tablet, Take 1 tablet by mouth Daily., Disp: , Rfl:     bumetanide (BUMEX) 2 MG tablet, Take 1 tablet by mouth Daily for 30 days., Disp: 30 tablet, Rfl: 0    clopidogrel (PLAVIX) 75 MG tablet, Take 1 tablet by mouth Daily., Disp: , Rfl:     levETIRAcetam (KEPPRA) 1000 MG tablet, Take 1 tablet by mouth Every 12 (Twelve) Hours for 30 days., Disp: 60 tablet, Rfl: 0    metFORMIN (GLUCOPHAGE) 1000 MG tablet, Take 1 tablet by mouth 2 (Two) Times a Day With Meals., Disp: , Rfl:     potassium chloride ER (K-TAB) 20 MEQ tablet controlled-release ER tablet, Take 1 tablet by mouth Daily., Disp: , Rfl:     sertraline (ZOLOFT) 25 MG tablet, Take 1 tablet by mouth Daily., Disp: , Rfl:     venlafaxine (EFFEXOR) 75 MG tablet, Take 1 tablet by mouth Daily., Disp: , Rfl:     Lantus SoloStar 100 UNIT/ML injection pen, Inject 20 Units under the skin into the appropriate area as directed Daily. (Patient not taking: Reported on 2/26/2024), Disp: , Rfl:       ROS:  Cardiac review of systems negative.    Objective     /70   Pulse 71   Ht 157.5 cm (62\")   Wt 71.7 kg (158 lb)   BMI 28.90 kg/m²       General Appearance:   well developed  well nourished  HENT:   oropharynx moist  lips not cyanotic  Respiratory:  no respiratory distress  normal breath sounds  no rales  Cardiovascular:  no jugular venous distention  regular rhythm  S1 normal, S2 normal  no S3, no S4   no murmur  no rub, no thrill  No carotid bruit  pedal pulses normal  lower extremity edema: none    Musculoskeletal:  no clubbing of fingers.   normocephalic, head atraumatic  Skin:   warm, " dry  Psychiatric:  judgement and insight appropriate  normal mood and affect    ECHO:  Results for orders placed during the hospital encounter of 23    Adult Transthoracic Echo Complete W/ Cont if Necessary Per Protocol (With Agitated Saline)    Interpretation Summary  Mild left ventricular hypertrophy with normal left ventricular systolic function.  Mild aortic stenosis.  Trace MR and trace TR.    STRESS:  Results for orders placed during the hospital encounter of 23    Stress Test With Myocardial Perfusion One Day    Interpretation Summary    Left ventricular ejection fraction is mildly reduced (Calculated EF = 42%).    Patient walked on modified Fabian protocol for 5 minutes and achieved 4.9 metabolic equivalents.  She was unable to reach target heart rate due to knee pain so Lexiscan was infused.    The patient noted shortness of breath but no chest pain.    Resting EKG showed normal sinus rhythm with right bundle branch block.  Stress electrocardiogram showed very wandering baseline.  No obvious ischemic looking changes noted.    Myocardial perfusion images show a relative fixed inferoapical defect that appears slightly better with stress.  A lateral reversible perfusion defect possibly consistent with ischemia is noted.    Feel this represents an abnormal Cheryl walk Cardiolite with possible lateral ischemia.  Ejection fraction was estimated at 42%.    CATH:  Results for orders placed during the hospital encounter of 23    Cardiac Catheterization/Vascular Study    Conclusion  [facility]  CARDIAC CATHETERIZATION PROCEDURE REPORT    Patient: Lennie Gomez  : 1955  MRN: [id number]    Procedure Date:  23    Referring Physician:  Jacqui Patterson APRN    Interventional Cardiologist:  Surendra Sprague MD    Indication:  Preop clearance for vascular surgery  Abnormal stress test    Clinical Presentation:  Patient is a 68-year-old female being worked up for carotid  endarterectomy.  She did have a Cardiolite stress test that showed possible lateral ischemia.    Procedure performed:  Right radial arterial sheath placement  2.   Selective coronary angiography  3.   Left heart catheterization  5.   Moderate conscious sedation  6.   Hemostasis achieved by TR band placement    Procedure Details:  Informed consent was obtained with an explanation of the risks, benefits and alternatives of the procedure. The patient was brought to the Cardiac Catheterization Laboratory in a fasting state.   Pateint was prepped and draped in a standard, sterile fashion. Moderate sedation with Fentanyl and Versed was administered by the circulating nurse. Lidocaine 2% was used to anesthetize the right radial artery and a 5/6 Slender glidesheath was placed.  A 5 F TGR catheter was then advanced over a 0.035 guidewire into the ascending aorta.  This catheter was used to engage the right and left coronary arteries with diagnostic angiography obtained in all appropriate projections by injection of non-ionic contrast. TGR catheter was used to cross the aortic valve and enter the left ventricle to measure hemodynamics and perform a left ventriculogram.  The catheter was then removed over a guidewire. The radial artery sheath was removed without difficulty and TR Band was placed over the access site with excellent hemostasis.    Patient tolerated the procedure well without any complications, and transferred to the post procedure area for recovery in a stable condition.      Findings:  1. Coronary Artery Anatomy:  Dominance: Probably codominant.  The RCA is small but does give off a PDA branch.  Left Main: Moderate size gives off LAD and circumflex.  There is mild diffuse disease.  Left Anterior Descending: Large vessel that wraps the apex.  There is a mid 80% stenosis.  Circumflex Artery: Moderate size vessel.  Has ostial 80% stenosis then proximal probably 60% stenosis.  Right Coronary Artery: Small to  intermediate size vessel but does give off a PDA branch.  There is mid to distal 80% stenosis.    2. Hemodynamics:  Left Ventricle: LVEDP was 5 mmHg  Aorta: 132/54 mmHg            Cumulative fluoroscopy time: 4.2 minutes    Cumulative air kerma: 256    Total amount of contrast used: 58 cc    Complications:  None.    Estimated Blood Loss:  Minimal.    Conclusions:  Three-vessel coronary artery disease.  Low LVEDP      Recommendations:  The ostial circumflex certainly looks significant along with probably the proximal circumflex.  The mid LAD and RCA have severe stenosis.  I would like the patient evaluated for possible coronary artery bypass grafting.  I have asked the cardiothoracic surgeons to look at her films.  They agree that especially with this ostial left circumflex stenosis that she would benefit from coronary artery bypass grafting.  We feel she can be discharged from a cardiac standpoint as she is asymptomatic.  They are going to get her in in the next couple days for evaluation and to schedule the bypass.  Goal-directed medical therapy and risk factor reduction.  The cardiothoracic surgeons are going to evaluate her carotid stenosis prior to this surgery and keep in touch with the vascular surgeons at [facility].        Electronically signed by Surendra Sprague MD, 08/23/23, 10:05 AM EDT.    BMP:     Glucose   Date Value Ref Range Status   11/20/2023 130 (H) 65 - 99 mg/dL Final     BUN   Date Value Ref Range Status   11/20/2023 5 (L) 8 - 23 mg/dL Final     Creatinine   Date Value Ref Range Status   11/20/2023 0.55 (L) 0.57 - 1.00 mg/dL Final     Sodium   Date Value Ref Range Status   11/20/2023 146 (H) 136 - 145 mmol/L Final     Potassium   Date Value Ref Range Status   11/20/2023 3.3 (L) 3.5 - 5.2 mmol/L Final     Chloride   Date Value Ref Range Status   11/20/2023 108 (H) 98 - 107 mmol/L Final     CO2   Date Value Ref Range Status   11/20/2023 27.4 22.0 - 29.0 mmol/L Final     Calcium   Date Value  Ref Range Status   11/20/2023 8.9 8.6 - 10.5 mg/dL Final     BUN/Creatinine Ratio   Date Value Ref Range Status   11/20/2023 9.1 7.0 - 25.0 Final     Anion Gap   Date Value Ref Range Status   11/20/2023 10.6 5.0 - 15.0 mmol/L Final     eGFR   Date Value Ref Range Status   11/20/2023 100.6 >60.0 mL/min/1.73 Final     LIPIDS:  Total Cholesterol   Date Value Ref Range Status   11/15/2023 90 0 - 200 mg/dL Final     Triglycerides   Date Value Ref Range Status   11/15/2023 105 0 - 150 mg/dL Final     HDL Cholesterol   Date Value Ref Range Status   11/15/2023 40 40 - 60 mg/dL Final     LDL Cholesterol    Date Value Ref Range Status   11/15/2023 30 0 - 100 mg/dL Final     VLDL Cholesterol   Date Value Ref Range Status   11/15/2023 20 5 - 40 mg/dL Final     LDL/HDL Ratio   Date Value Ref Range Status   11/15/2023 0.73  Final         Procedures             ASSESSMENT:  Diagnoses and all orders for this visit:    1. CAD, multiple vessel (Primary)    2. Hypertension, essential    3. Elevated cholesterol         PLAN:    1.  Continue the Lipitor.  Patient cholesterol checked 11/15/2023 with LDL 30, HDL 40, and triglycerides 105.  These are under excellent control.  2.  I told patient I would consider stopping the aspirin and just staying on the Plavix.  3.  Patient had CTA of the carotids after the left carotid stent which showed a 61% left ICA stenosis thought due to stent D formation.  Her right ICA was 65%.  This was back in November 2023.  4.  Blood pressures under good control and she has not needed any further blood pressure pills.  5.  Encourage complete smoking cessation.  She is just smoking a cigarette every once in a while.  5.  Encouraged the patient to start exercising and call us if any exertional cardiac complaints.      Return in about 6 months (around 8/26/2024).     Patient was given instructions and counseling regarding her condition or for health maintenance advice. Please see specific information pulled into  the AVS if appropriate.         Surendra Sprague MD   2/26/2024  12:43 EST

## 2024-09-03 ENCOUNTER — TRANSCRIBE ORDERS (OUTPATIENT)
Dept: ADMINISTRATIVE | Facility: HOSPITAL | Age: 69
End: 2024-09-03
Payer: MEDICARE

## 2024-09-03 DIAGNOSIS — Z12.31 BREAST CANCER SCREENING BY MAMMOGRAM: ICD-10-CM

## 2024-09-03 DIAGNOSIS — F17.200 NICOTINE DEPENDENCE WITH CURRENT USE: Primary | ICD-10-CM

## 2024-09-03 DIAGNOSIS — M25.512 ACUTE PAIN OF LEFT SHOULDER: Primary | ICD-10-CM

## 2024-09-09 ENCOUNTER — TRANSCRIBE ORDERS (OUTPATIENT)
Dept: ADMINISTRATIVE | Facility: HOSPITAL | Age: 69
End: 2024-09-09
Payer: MEDICARE

## 2024-09-09 DIAGNOSIS — Z12.31 BREAST CANCER SCREENING BY MAMMOGRAM: Primary | ICD-10-CM

## 2024-09-25 ENCOUNTER — TELEPHONE (OUTPATIENT)
Dept: CARDIOLOGY | Facility: CLINIC | Age: 69
End: 2024-09-25
Payer: MEDICARE

## 2024-09-26 ENCOUNTER — TRANSCRIBE ORDERS (OUTPATIENT)
Dept: ADMINISTRATIVE | Facility: HOSPITAL | Age: 69
End: 2024-09-26
Payer: MEDICARE

## 2024-09-26 DIAGNOSIS — G40.309 BENIGN NEONATAL CONVULSIONS: ICD-10-CM

## 2024-09-26 DIAGNOSIS — Q28.2 CEREBRAL-RETINAL ARTERIOVENOUS ANEURYSM: Primary | ICD-10-CM

## 2025-03-04 ENCOUNTER — HOSPITAL ENCOUNTER (OUTPATIENT)
Dept: MRI IMAGING | Facility: HOSPITAL | Age: 70
Discharge: HOME | End: 2025-03-04
Payer: MEDICARE

## 2025-03-04 DIAGNOSIS — Q28.2 CEREBRAL-RETINAL ARTERIOVENOUS ANEURYSM: ICD-10-CM

## 2025-03-04 DIAGNOSIS — M25.512 ACUTE PAIN OF LEFT SHOULDER: ICD-10-CM

## 2025-03-04 DIAGNOSIS — G40.309 BENIGN NEONATAL CONVULSIONS: ICD-10-CM

## 2025-03-04 PROCEDURE — 73221 MRI JOINT UPR EXTREM W/O DYE: CPT

## 2025-03-04 PROCEDURE — 70547 MR ANGIOGRAPHY NECK W/O DYE: CPT

## 2025-03-04 PROCEDURE — 70544 MR ANGIOGRAPHY HEAD W/O DYE: CPT

## 2025-03-19 ENCOUNTER — TRANSCRIBE ORDERS (OUTPATIENT)
Dept: ADMINISTRATIVE | Facility: HOSPITAL | Age: 70
End: 2025-03-19
Payer: MEDICARE

## 2025-03-19 DIAGNOSIS — Z12.31 SCREENING MAMMOGRAM, ENCOUNTER FOR: ICD-10-CM

## 2025-03-19 DIAGNOSIS — F17.210 CIGARETTE SMOKER: Primary | ICD-10-CM

## 2025-03-19 DIAGNOSIS — M81.0 OSTEOPOROSIS, POST-MENOPAUSAL: ICD-10-CM

## 2025-07-09 ENCOUNTER — TELEPHONE (OUTPATIENT)
Dept: CARDIOLOGY | Facility: CLINIC | Age: 70
End: 2025-07-09

## (undated) DEVICE — Device

## (undated) DEVICE — MARKR SKIN W/RULR AND LBL

## (undated) DEVICE — GLV SURG BIOGEL LTX PF 7

## (undated) DEVICE — CORONARY ARTERY BYPASS GRAFT MARKERS, STAINLESS STEEL, DISTAL, WITHOUT HOLDER: Brand: ANASTOMARK CORONARY ARTERY BYPASS GRAFT MARKERS, STAINLESS STEEL, DISTAL

## (undated) DEVICE — PK ATS CUST W CARDIOTOMY RESEVOIR

## (undated) DEVICE — BNDG ELAS ELITE V/CLOSE 6IN 5YD LF STRL

## (undated) DEVICE — SOL IRR NACL 0.9PCT BT 1000ML

## (undated) DEVICE — ROTATING SURGICAL PUNCHES, 1 PER POUCH: Brand: A&E MEDICAL / ROTATING SURGICAL PUNCHES

## (undated) DEVICE — SYS VASOVIEW HEMOPRO ENDOSCOPIC HARVST VESL

## (undated) DEVICE — BIOPATCH™ ANTIMICROBIAL DRESSING WITH CHLORHEXIDINE GLUCONATE IS A HYDROPHILLIC POLYURETHANE ABSORPTIVE FOAM WITH CHLORHEXIDINE GLUCONATE (CHG) WHICH INHIBITS BACTERIAL GROWTH UNDER THE DRESSING. THE DRESSING IS INTENDED TO BE USED TO ABSORB EXUDATE, COVER A WOUND CAUSED BY VASCULAR AND NONVASCULAR PERCUTANEOUS MEDICAL DEVICES DURING SURGERY, AS WELL AS REDUCE LOCAL INFECTION AND COLONIZATION OF MICROORGANISMS.: Brand: BIOPATCH

## (undated) DEVICE — HEMOCONCENTRATOR PERFUS LPS06

## (undated) DEVICE — GLV SURG BIOGEL LTX PF 7 1/2

## (undated) DEVICE — DRSNG WND BORDR/ADHS NONADHR/GZ LF 4X14IN STRL

## (undated) DEVICE — BLOWER/MISTER AXIOUS OPCAB W/TBG

## (undated) DEVICE — ST TOURNI COMPL A/ 7IN

## (undated) DEVICE — ST. SORBAVIEW ULTIMATE IJ SYSTEM A,C: Brand: CENTURION

## (undated) DEVICE — DRP SLUSH WARMR MACH RECTG 66X44IN

## (undated) DEVICE — 8 FOOT DISPOSABLE EXTENSION CABLE WITH SAFE CONNECT / ALLIGATOR CLIP

## (undated) DEVICE — CANN AORT ROOT DLP VNT 14G 7F

## (undated) DEVICE — DRSNG WND GZ PAD BORDERED 4X8IN STRL

## (undated) DEVICE — 28 FR STRAIGHT – SOFT PVC CATHETER: Brand: PVC THORACIC CATHETERS

## (undated) DEVICE — PK PERFUS CUST W/CARDIOPLEGIA

## (undated) DEVICE — CATH F6INF TL 3DRC 100CM: Brand: INFINITI

## (undated) DEVICE — OASIS DRAIN, SINGLE, INLINE & ATS COMPATIBLE: Brand: OASIS

## (undated) DEVICE — BNDG ELAS ELITE V/CLOSE 4IN 5YD LF STRL

## (undated) DEVICE — DRSNG SURESITE WNDW 2.38X2.75

## (undated) DEVICE — SOL NACL 0.9PCT 1000ML

## (undated) DEVICE — CANN ART EOPA 3D NV W/CONN 20F

## (undated) DEVICE — AIRLIFE™ HCH HYGROSCOPIC CONDENSER HUMIDFIER: Brand: AIRLIFE™

## (undated) DEVICE — PREP SOL POVIDONE/IODINE BT 4OZ

## (undated) DEVICE — TBG ART PRESS 60 IN

## (undated) DEVICE — SOL ISO/ALC 70PCT 4OZ

## (undated) DEVICE — DECANTER BAG 9": Brand: MEDLINE INDUSTRIES, INC.

## (undated) DEVICE — SOL IRR RNG BTL 1000ML

## (undated) DEVICE — CVR PROB 96IN LF STRL

## (undated) DEVICE — GLIDESHEATH SLENDER STAINLESS STEEL KIT: Brand: GLIDESHEATH SLENDER

## (undated) DEVICE — SPNG GZ WOVN 4X4IN 12PLY 10/BX STRL

## (undated) DEVICE — SYS PERFUS SEP PLATLT W TIPS CUST

## (undated) DEVICE — PK HEART OPN 40

## (undated) DEVICE — GW FC FLOP/TP .035 260CM 3MM

## (undated) DEVICE — CANN VESL FREE FLO 2MM

## (undated) DEVICE — CLAMP INSERT: Brand: STEALTH® CLAMP INSERT

## (undated) DEVICE — TBG INSUFFLATION LUER LOCK: Brand: MEDLINE INDUSTRIES, INC.

## (undated) DEVICE — OPTIFOAM GENTLE SA, POSTOP, 4X12: Brand: MEDLINE

## (undated) DEVICE — GLV SURG BIOGEL LTX PF 6 1/2

## (undated) DEVICE — BG TRANSF W/COUPLER SPK 600ML

## (undated) DEVICE — RADIFOCUS OPTITORQUE ANGIOGRAPHIC CATHETER: Brand: OPTITORQUE

## (undated) DEVICE — SENSR CERBRL O2 PK/2